# Patient Record
Sex: FEMALE | Race: WHITE | NOT HISPANIC OR LATINO | Employment: UNEMPLOYED | URBAN - METROPOLITAN AREA
[De-identification: names, ages, dates, MRNs, and addresses within clinical notes are randomized per-mention and may not be internally consistent; named-entity substitution may affect disease eponyms.]

---

## 2023-10-10 DIAGNOSIS — J84.9 INTERSTITIAL LUNG DISEASE (MULTI): ICD-10-CM

## 2023-10-11 ENCOUNTER — CLINICAL SUPPORT (OUTPATIENT)
Dept: CARDIAC REHAB | Facility: HOSPITAL | Age: 68
End: 2023-10-11
Payer: COMMERCIAL

## 2023-10-11 DIAGNOSIS — J84.9 INTERSTITIAL LUNG DISEASE (MULTI): ICD-10-CM

## 2023-10-11 PROCEDURE — G0239 OTH RESP PROC, GROUP: HCPCS | Performed by: INTERNAL MEDICINE

## 2023-10-15 PROBLEM — J32.1 SINUSITIS CHRONIC, FRONTAL: Status: ACTIVE | Noted: 2023-10-15

## 2023-10-15 PROBLEM — I10 ESSENTIAL HYPERTENSION: Status: ACTIVE | Noted: 2023-10-15

## 2023-10-15 PROBLEM — F81.9 LEARNING DIFFICULTY: Status: ACTIVE | Noted: 2023-10-15

## 2023-10-15 PROBLEM — J00 FREQUENT COMMON COLDS: Status: ACTIVE | Noted: 2023-10-15

## 2023-10-15 PROBLEM — G54.0 BRACHIAL PLEXUS DYSFUNCTION: Status: ACTIVE | Noted: 2023-10-15

## 2023-10-15 PROBLEM — J30.2 SEASONAL ALLERGIES: Status: ACTIVE | Noted: 2023-10-15

## 2023-10-15 PROBLEM — J44.9 CHRONIC OBSTRUCTIVE PULMONARY DISEASE (MULTI): Status: ACTIVE | Noted: 2023-10-15

## 2023-10-15 PROBLEM — E55.9 VITAMIN D DEFICIENCY: Status: ACTIVE | Noted: 2023-10-15

## 2023-10-15 PROBLEM — G47.33 OSA (OBSTRUCTIVE SLEEP APNEA): Status: ACTIVE | Noted: 2023-10-15

## 2023-10-15 PROBLEM — K21.9 GERD (GASTROESOPHAGEAL REFLUX DISEASE): Status: ACTIVE | Noted: 2023-10-15

## 2023-10-15 PROBLEM — R91.8 PULMONARY NODULES: Status: ACTIVE | Noted: 2023-10-15

## 2023-10-15 PROBLEM — E66.01 OBESITY, CLASS III, BMI 40-49.9 (MORBID OBESITY) (MULTI): Status: ACTIVE | Noted: 2017-06-28

## 2023-10-15 PROBLEM — E66.813 OBESITY, CLASS III, BMI 40-49.9 (MORBID OBESITY): Status: ACTIVE | Noted: 2017-06-28

## 2023-10-15 PROBLEM — M54.12 CERVICAL RADICULOPATHY: Status: ACTIVE | Noted: 2023-10-15

## 2023-10-15 PROBLEM — E11.9 TYPE 2 DIABETES MELLITUS WITHOUT COMPLICATION (MULTI): Status: ACTIVE | Noted: 2023-10-15

## 2023-10-15 PROBLEM — I87.2 VENOUS INSUFFICIENCY OF BOTH LOWER EXTREMITIES: Status: ACTIVE | Noted: 2023-10-15

## 2023-10-15 PROBLEM — Z99.81 SUPPLEMENTAL OXYGEN DEPENDENT: Status: ACTIVE | Noted: 2023-10-15

## 2023-10-15 PROBLEM — M48.02 CERVICAL STENOSIS OF SPINAL CANAL: Status: ACTIVE | Noted: 2023-10-15

## 2023-10-15 PROBLEM — E78.00 HYPERCHOLESTEROLEMIA: Status: ACTIVE | Noted: 2023-10-15

## 2023-10-15 PROBLEM — J44.1 COPD WITH EXACERBATION (MULTI): Status: ACTIVE | Noted: 2023-10-15

## 2023-10-15 PROBLEM — R60.0 EDEMA OF LOWER EXTREMITY: Status: ACTIVE | Noted: 2023-10-15

## 2023-10-15 RX ORDER — ATORVASTATIN CALCIUM 20 MG/1
20 TABLET, FILM COATED ORAL DAILY
COMMUNITY
Start: 2022-12-14 | End: 2024-02-19 | Stop reason: SDUPTHER

## 2023-10-15 RX ORDER — OMEPRAZOLE 20 MG/1
1 TABLET, DELAYED RELEASE ORAL DAILY
COMMUNITY
End: 2023-12-26 | Stop reason: ALTCHOICE

## 2023-10-15 RX ORDER — FLUTICASONE PROPIONATE 50 MCG
1 SPRAY, SUSPENSION (ML) NASAL DAILY
COMMUNITY
End: 2024-04-05

## 2023-10-15 RX ORDER — DOCUSATE SODIUM 100 MG/1
100 CAPSULE, LIQUID FILLED ORAL 2 TIMES DAILY
COMMUNITY
Start: 2022-01-20 | End: 2023-12-26 | Stop reason: ALTCHOICE

## 2023-10-15 RX ORDER — FLUTICASONE FUROATE, UMECLIDINIUM BROMIDE AND VILANTEROL TRIFENATATE 200; 62.5; 25 UG/1; UG/1; UG/1
1 POWDER RESPIRATORY (INHALATION) DAILY
COMMUNITY

## 2023-10-15 RX ORDER — LORATADINE 10 MG/1
10 TABLET ORAL DAILY PRN
COMMUNITY
Start: 2019-06-25

## 2023-10-15 RX ORDER — METFORMIN HYDROCHLORIDE 500 MG/1
500 TABLET ORAL
COMMUNITY
Start: 2022-01-20 | End: 2024-05-15 | Stop reason: SDUPTHER

## 2023-10-15 RX ORDER — BLOOD SUGAR DIAGNOSTIC
STRIP MISCELLANEOUS 2 TIMES DAILY
COMMUNITY
Start: 2015-08-06 | End: 2023-10-27

## 2023-10-15 RX ORDER — FLUTICASONE FUROATE AND VILANTEROL 200; 25 UG/1; UG/1
1 POWDER RESPIRATORY (INHALATION) DAILY
COMMUNITY
Start: 2023-05-13 | End: 2023-12-26 | Stop reason: ALTCHOICE

## 2023-10-15 RX ORDER — ALBUTEROL SULFATE 0.83 MG/ML
2.5 SOLUTION RESPIRATORY (INHALATION) EVERY 6 HOURS PRN
COMMUNITY

## 2023-10-15 RX ORDER — SODIUM CHLORIDE 0.9 % (FLUSH) 0.9 %
10 SYRINGE (ML) INJECTION
COMMUNITY
Start: 2023-07-26 | End: 2023-12-26 | Stop reason: ALTCHOICE

## 2023-10-15 RX ORDER — FUROSEMIDE 20 MG/1
20 TABLET ORAL DAILY
COMMUNITY
End: 2024-05-15

## 2023-10-15 RX ORDER — GUAIFENESIN AND DEXTROMETHORPHAN HYDROBROMIDE 1200; 60 MG/1; MG/1
1 TABLET, EXTENDED RELEASE ORAL EVERY 12 HOURS
COMMUNITY
Start: 2023-03-10 | End: 2024-05-15

## 2023-10-15 RX ORDER — IPRATROPIUM BROMIDE 0.5 MG/2.5ML
2.5 SOLUTION RESPIRATORY (INHALATION) 4 TIMES DAILY
COMMUNITY

## 2023-10-15 RX ORDER — ATORVASTATIN CALCIUM 40 MG/1
40 TABLET, FILM COATED ORAL DAILY
COMMUNITY
End: 2023-12-26 | Stop reason: SDUPTHER

## 2023-10-15 RX ORDER — NAPROXEN SODIUM 220 MG/1
TABLET, FILM COATED ORAL
COMMUNITY
Start: 2016-02-22

## 2023-10-15 RX ORDER — APIXABAN 5 MG/1
5 TABLET, FILM COATED ORAL 2 TIMES DAILY
COMMUNITY
End: 2023-12-26 | Stop reason: SDUPTHER

## 2023-10-15 RX ORDER — LISINOPRIL 10 MG/1
10 TABLET ORAL DAILY
COMMUNITY
Start: 2022-12-14

## 2023-10-15 RX ORDER — ALBUTEROL SULFATE 90 UG/1
1 AEROSOL, METERED RESPIRATORY (INHALATION) EVERY 4 HOURS PRN
COMMUNITY
End: 2024-01-08

## 2023-10-15 RX ORDER — BENZONATATE 100 MG/1
200 CAPSULE ORAL 3 TIMES DAILY PRN
COMMUNITY

## 2023-10-15 RX ORDER — MYCOPHENOLATE MOFETIL 500 MG/1
1000 TABLET ORAL 2 TIMES DAILY
COMMUNITY

## 2023-10-15 RX ORDER — IPRATROPIUM BROMIDE AND ALBUTEROL SULFATE 2.5; .5 MG/3ML; MG/3ML
3 SOLUTION RESPIRATORY (INHALATION) 4 TIMES DAILY
COMMUNITY
End: 2023-12-26 | Stop reason: SDUPTHER

## 2023-10-15 RX ORDER — POLYETHYLENE GLYCOL 3350 17 G/17G
17 POWDER, FOR SOLUTION ORAL DAILY
COMMUNITY
Start: 2022-01-20

## 2023-10-15 RX ORDER — ALBUTEROL SULFATE 1.25 MG/3ML
1.25 SOLUTION RESPIRATORY (INHALATION) EVERY 6 HOURS
COMMUNITY
End: 2023-12-26 | Stop reason: SDUPTHER

## 2023-10-15 RX ORDER — SODIUM CHLORIDE FOR INHALATION 3 %
4 VIAL, NEBULIZER (ML) INHALATION 2 TIMES DAILY
COMMUNITY

## 2023-10-17 ENCOUNTER — CLINICAL SUPPORT (OUTPATIENT)
Dept: CARDIAC REHAB | Facility: HOSPITAL | Age: 68
End: 2023-10-17
Payer: COMMERCIAL

## 2023-10-17 DIAGNOSIS — J84.9 INTERSTITIAL LUNG DISEASE (MULTI): Primary | ICD-10-CM

## 2023-10-17 PROCEDURE — G0239 OTH RESP PROC, GROUP: HCPCS | Performed by: INTERNAL MEDICINE

## 2023-10-19 ENCOUNTER — APPOINTMENT (OUTPATIENT)
Dept: CARDIAC REHAB | Facility: HOSPITAL | Age: 68
End: 2023-10-19
Payer: COMMERCIAL

## 2023-10-20 ENCOUNTER — CLINICAL SUPPORT (OUTPATIENT)
Dept: CARDIAC REHAB | Facility: HOSPITAL | Age: 68
End: 2023-10-20
Payer: COMMERCIAL

## 2023-10-20 DIAGNOSIS — J84.9 INTERSTITIAL LUNG DISEASE (MULTI): ICD-10-CM

## 2023-10-20 PROCEDURE — G0239 OTH RESP PROC, GROUP: HCPCS | Performed by: INTERNAL MEDICINE

## 2023-10-24 ENCOUNTER — CLINICAL SUPPORT (OUTPATIENT)
Dept: CARDIAC REHAB | Facility: HOSPITAL | Age: 68
End: 2023-10-24
Payer: COMMERCIAL

## 2023-10-24 DIAGNOSIS — J84.9 INTERSTITIAL LUNG DISEASE (MULTI): Primary | ICD-10-CM

## 2023-10-24 PROCEDURE — G0239 OTH RESP PROC, GROUP: HCPCS | Performed by: INTERNAL MEDICINE

## 2023-10-26 ENCOUNTER — CLINICAL SUPPORT (OUTPATIENT)
Dept: CARDIAC REHAB | Facility: HOSPITAL | Age: 68
End: 2023-10-26
Payer: COMMERCIAL

## 2023-10-26 DIAGNOSIS — J84.9 INTERSTITIAL LUNG DISEASE (MULTI): Primary | ICD-10-CM

## 2023-10-26 PROCEDURE — G0239 OTH RESP PROC, GROUP: HCPCS | Performed by: INTERNAL MEDICINE

## 2023-10-27 ENCOUNTER — CLINICAL SUPPORT (OUTPATIENT)
Dept: CARDIAC REHAB | Facility: HOSPITAL | Age: 68
End: 2023-10-27
Payer: COMMERCIAL

## 2023-10-27 DIAGNOSIS — E11.9 TYPE 2 DIABETES MELLITUS WITHOUT COMPLICATION, WITHOUT LONG-TERM CURRENT USE OF INSULIN (MULTI): ICD-10-CM

## 2023-10-27 DIAGNOSIS — J84.9 INTERSTITIAL LUNG DISEASE (MULTI): ICD-10-CM

## 2023-10-27 PROCEDURE — G0239 OTH RESP PROC, GROUP: HCPCS | Performed by: INTERNAL MEDICINE

## 2023-10-27 RX ORDER — BLOOD-GLUCOSE METER
EACH MISCELLANEOUS
Qty: 50 STRIP | Refills: 15 | Status: SHIPPED | OUTPATIENT
Start: 2023-10-27

## 2023-11-07 ENCOUNTER — CLINICAL SUPPORT (OUTPATIENT)
Dept: CARDIAC REHAB | Facility: HOSPITAL | Age: 68
End: 2023-11-07
Payer: COMMERCIAL

## 2023-11-07 DIAGNOSIS — J84.9 INTERSTITIAL LUNG DISEASE (MULTI): Primary | ICD-10-CM

## 2023-11-07 PROCEDURE — G0239 OTH RESP PROC, GROUP: HCPCS | Performed by: INTERNAL MEDICINE

## 2023-11-10 ENCOUNTER — CLINICAL SUPPORT (OUTPATIENT)
Dept: CARDIAC REHAB | Facility: HOSPITAL | Age: 68
End: 2023-11-10
Payer: COMMERCIAL

## 2023-11-10 DIAGNOSIS — J84.9 INTERSTITIAL LUNG DISEASE (MULTI): Primary | ICD-10-CM

## 2023-11-10 PROCEDURE — G0239 OTH RESP PROC, GROUP: HCPCS | Performed by: INTERNAL MEDICINE

## 2023-11-14 ENCOUNTER — CLINICAL SUPPORT (OUTPATIENT)
Dept: CARDIAC REHAB | Facility: HOSPITAL | Age: 68
End: 2023-11-14
Payer: COMMERCIAL

## 2023-11-14 DIAGNOSIS — J84.9 INTERSTITIAL LUNG DISEASE (MULTI): Primary | ICD-10-CM

## 2023-11-14 PROCEDURE — G0239 OTH RESP PROC, GROUP: HCPCS | Performed by: INTERNAL MEDICINE

## 2023-11-16 ENCOUNTER — CLINICAL SUPPORT (OUTPATIENT)
Dept: CARDIAC REHAB | Facility: HOSPITAL | Age: 68
End: 2023-11-16
Payer: COMMERCIAL

## 2023-11-16 DIAGNOSIS — J84.9 INTERSTITIAL LUNG DISEASE (MULTI): Primary | ICD-10-CM

## 2023-11-16 PROCEDURE — G0239 OTH RESP PROC, GROUP: HCPCS | Performed by: INTERNAL MEDICINE

## 2023-11-17 ENCOUNTER — CLINICAL SUPPORT (OUTPATIENT)
Dept: CARDIAC REHAB | Facility: HOSPITAL | Age: 68
End: 2023-11-17
Payer: COMMERCIAL

## 2023-11-17 DIAGNOSIS — J84.9 INTERSTITIAL LUNG DISEASE (MULTI): Primary | ICD-10-CM

## 2023-11-17 PROCEDURE — G0239 OTH RESP PROC, GROUP: HCPCS | Performed by: INTERNAL MEDICINE

## 2023-11-21 ENCOUNTER — APPOINTMENT (OUTPATIENT)
Dept: CARDIAC REHAB | Facility: HOSPITAL | Age: 68
End: 2023-11-21
Payer: COMMERCIAL

## 2023-11-24 ENCOUNTER — APPOINTMENT (OUTPATIENT)
Dept: CARDIAC REHAB | Facility: HOSPITAL | Age: 68
End: 2023-11-24
Payer: COMMERCIAL

## 2023-11-28 ENCOUNTER — APPOINTMENT (OUTPATIENT)
Dept: CARDIAC REHAB | Facility: HOSPITAL | Age: 68
End: 2023-11-28
Payer: COMMERCIAL

## 2023-11-30 ENCOUNTER — CLINICAL SUPPORT (OUTPATIENT)
Dept: CARDIAC REHAB | Facility: HOSPITAL | Age: 68
End: 2023-11-30
Payer: COMMERCIAL

## 2023-11-30 DIAGNOSIS — J84.9 LUNG DISEASE, INTERSTITIAL (MULTI): Primary | ICD-10-CM

## 2023-11-30 PROCEDURE — G0239 OTH RESP PROC, GROUP: HCPCS | Performed by: INTERNAL MEDICINE

## 2023-12-01 ENCOUNTER — APPOINTMENT (OUTPATIENT)
Dept: CARDIAC REHAB | Facility: HOSPITAL | Age: 68
End: 2023-12-01
Payer: COMMERCIAL

## 2023-12-05 ENCOUNTER — CLINICAL SUPPORT (OUTPATIENT)
Dept: CARDIAC REHAB | Facility: HOSPITAL | Age: 68
End: 2023-12-05
Payer: COMMERCIAL

## 2023-12-05 DIAGNOSIS — J84.9 INTERSTITIAL LUNG DISEASE (MULTI): Primary | ICD-10-CM

## 2023-12-05 PROCEDURE — G0239 OTH RESP PROC, GROUP: HCPCS | Performed by: INTERNAL MEDICINE

## 2023-12-07 ENCOUNTER — CLINICAL SUPPORT (OUTPATIENT)
Dept: CARDIAC REHAB | Facility: HOSPITAL | Age: 68
End: 2023-12-07
Payer: COMMERCIAL

## 2023-12-07 DIAGNOSIS — J84.9 INTERSTITIAL LUNG DISEASE (MULTI): Primary | ICD-10-CM

## 2023-12-07 PROCEDURE — G0239 OTH RESP PROC, GROUP: HCPCS | Performed by: INTERNAL MEDICINE

## 2023-12-08 ENCOUNTER — CLINICAL SUPPORT (OUTPATIENT)
Dept: CARDIAC REHAB | Facility: HOSPITAL | Age: 68
End: 2023-12-08
Payer: COMMERCIAL

## 2023-12-08 DIAGNOSIS — J84.9 INTERSTITIAL LUNG DISEASE (MULTI): Primary | ICD-10-CM

## 2023-12-08 PROCEDURE — G0239 OTH RESP PROC, GROUP: HCPCS | Performed by: INTERNAL MEDICINE

## 2023-12-12 ENCOUNTER — CLINICAL SUPPORT (OUTPATIENT)
Dept: CARDIAC REHAB | Facility: HOSPITAL | Age: 68
End: 2023-12-12
Payer: COMMERCIAL

## 2023-12-12 DIAGNOSIS — J84.9 INTERSTITIAL LUNG DISEASE (MULTI): ICD-10-CM

## 2023-12-12 PROCEDURE — G0239 OTH RESP PROC, GROUP: HCPCS | Performed by: INTERNAL MEDICINE

## 2023-12-14 ENCOUNTER — CLINICAL SUPPORT (OUTPATIENT)
Dept: CARDIAC REHAB | Facility: HOSPITAL | Age: 68
End: 2023-12-14
Payer: COMMERCIAL

## 2023-12-14 DIAGNOSIS — J84.9 INTERSTITIAL LUNG DISEASE (MULTI): Primary | ICD-10-CM

## 2023-12-14 PROCEDURE — G0239 OTH RESP PROC, GROUP: HCPCS | Performed by: INTERNAL MEDICINE

## 2023-12-15 ENCOUNTER — APPOINTMENT (OUTPATIENT)
Dept: CARDIAC REHAB | Facility: HOSPITAL | Age: 68
End: 2023-12-15
Payer: COMMERCIAL

## 2023-12-19 ENCOUNTER — CLINICAL SUPPORT (OUTPATIENT)
Dept: CARDIAC REHAB | Facility: HOSPITAL | Age: 68
End: 2023-12-19
Payer: COMMERCIAL

## 2023-12-19 DIAGNOSIS — J84.9 INTERSTITIAL LUNG DISEASE (MULTI): Primary | ICD-10-CM

## 2023-12-19 PROCEDURE — G0239 OTH RESP PROC, GROUP: HCPCS | Performed by: INTERNAL MEDICINE

## 2023-12-21 ENCOUNTER — CLINICAL SUPPORT (OUTPATIENT)
Dept: CARDIAC REHAB | Facility: HOSPITAL | Age: 68
End: 2023-12-21
Payer: COMMERCIAL

## 2023-12-21 DIAGNOSIS — J84.9 INTERSTITIAL LUNG DISEASE (MULTI): Primary | ICD-10-CM

## 2023-12-21 PROCEDURE — G0239 OTH RESP PROC, GROUP: HCPCS | Performed by: INTERNAL MEDICINE

## 2023-12-22 ENCOUNTER — CLINICAL SUPPORT (OUTPATIENT)
Dept: CARDIAC REHAB | Facility: HOSPITAL | Age: 68
End: 2023-12-22
Payer: COMMERCIAL

## 2023-12-22 DIAGNOSIS — J84.9 INTERSTITIAL LUNG DISEASE (MULTI): Primary | ICD-10-CM

## 2023-12-22 PROCEDURE — G0239 OTH RESP PROC, GROUP: HCPCS | Performed by: INTERNAL MEDICINE

## 2023-12-26 ENCOUNTER — APPOINTMENT (OUTPATIENT)
Dept: CARDIAC REHAB | Facility: HOSPITAL | Age: 68
End: 2023-12-26
Payer: COMMERCIAL

## 2023-12-26 ENCOUNTER — OFFICE VISIT (OUTPATIENT)
Dept: PRIMARY CARE | Facility: CLINIC | Age: 68
End: 2023-12-26
Payer: COMMERCIAL

## 2023-12-26 VITALS
HEIGHT: 63 IN | OXYGEN SATURATION: 98 % | SYSTOLIC BLOOD PRESSURE: 126 MMHG | BODY MASS INDEX: 31.4 KG/M2 | WEIGHT: 177.2 LBS | TEMPERATURE: 97 F | HEART RATE: 89 BPM | DIASTOLIC BLOOD PRESSURE: 78 MMHG

## 2023-12-26 DIAGNOSIS — E11.9 TYPE 2 DIABETES MELLITUS WITHOUT COMPLICATION, WITHOUT LONG-TERM CURRENT USE OF INSULIN (MULTI): ICD-10-CM

## 2023-12-26 DIAGNOSIS — Z86.718 HISTORY OF DVT OF LOWER EXTREMITY: ICD-10-CM

## 2023-12-26 DIAGNOSIS — I10 ESSENTIAL HYPERTENSION: Primary | ICD-10-CM

## 2023-12-26 DIAGNOSIS — J84.9 INTERSTITIAL LUNG DISEASE (MULTI): ICD-10-CM

## 2023-12-26 DIAGNOSIS — J20.9 ACUTE BRONCHITIS, UNSPECIFIED ORGANISM: ICD-10-CM

## 2023-12-26 DIAGNOSIS — K21.9 GASTROESOPHAGEAL REFLUX DISEASE, UNSPECIFIED WHETHER ESOPHAGITIS PRESENT: ICD-10-CM

## 2023-12-26 PROCEDURE — 3008F BODY MASS INDEX DOCD: CPT | Performed by: NURSE PRACTITIONER

## 2023-12-26 PROCEDURE — 3078F DIAST BP <80 MM HG: CPT | Performed by: NURSE PRACTITIONER

## 2023-12-26 PROCEDURE — 3074F SYST BP LT 130 MM HG: CPT | Performed by: NURSE PRACTITIONER

## 2023-12-26 PROCEDURE — 4010F ACE/ARB THERAPY RXD/TAKEN: CPT | Performed by: NURSE PRACTITIONER

## 2023-12-26 PROCEDURE — 1160F RVW MEDS BY RX/DR IN RCRD: CPT | Performed by: NURSE PRACTITIONER

## 2023-12-26 PROCEDURE — 1159F MED LIST DOCD IN RCRD: CPT | Performed by: NURSE PRACTITIONER

## 2023-12-26 PROCEDURE — 1126F AMNT PAIN NOTED NONE PRSNT: CPT | Performed by: NURSE PRACTITIONER

## 2023-12-26 PROCEDURE — 1036F TOBACCO NON-USER: CPT | Performed by: NURSE PRACTITIONER

## 2023-12-26 PROCEDURE — 99214 OFFICE O/P EST MOD 30 MIN: CPT | Performed by: NURSE PRACTITIONER

## 2023-12-26 RX ORDER — GUAIFENESIN 600 MG/1
600 TABLET, EXTENDED RELEASE ORAL 2 TIMES DAILY
Qty: 20 TABLET | Refills: 0 | Status: SHIPPED | OUTPATIENT
Start: 2023-12-26 | End: 2024-12-25

## 2023-12-26 RX ORDER — APIXABAN 5 MG/1
5 TABLET, FILM COATED ORAL 2 TIMES DAILY
Qty: 180 TABLET | Refills: 3 | Status: SHIPPED | OUTPATIENT
Start: 2023-12-26 | End: 2024-12-25

## 2023-12-26 RX ORDER — AZITHROMYCIN 250 MG/1
250 TABLET, FILM COATED ORAL DAILY
Qty: 6 TABLET | Refills: 0 | Status: SHIPPED | OUTPATIENT
Start: 2023-12-26 | End: 2023-12-31

## 2023-12-26 ASSESSMENT — ENCOUNTER SYMPTOMS
ADENOPATHY: 0
EYE DISCHARGE: 0
LOSS OF SENSATION IN FEET: 0
PHOTOPHOBIA: 0
SINUS PAIN: 0
DYSURIA: 0
BRUISES/BLEEDS EASILY: 0
JOINT SWELLING: 0
ACTIVITY CHANGE: 0
DIARRHEA: 0
ARTHRALGIAS: 0
HEADACHES: 0
TREMORS: 0
ABDOMINAL PAIN: 0
OCCASIONAL FEELINGS OF UNSTEADINESS: 0
DEPRESSION: 0
BACK PAIN: 0
BLOOD IN STOOL: 0
AGITATION: 0
WHEEZING: 0
COUGH: 1
CONSTIPATION: 0
NERVOUS/ANXIOUS: 0
SORE THROAT: 0
SHORTNESS OF BREATH: 0
WEAKNESS: 0
APPETITE CHANGE: 0
PALPITATIONS: 0
DIZZINESS: 0
HEMATURIA: 0

## 2023-12-26 ASSESSMENT — LIFESTYLE VARIABLES: HOW MANY STANDARD DRINKS CONTAINING ALCOHOL DO YOU HAVE ON A TYPICAL DAY: PATIENT DOES NOT DRINK

## 2023-12-26 ASSESSMENT — PATIENT HEALTH QUESTIONNAIRE - PHQ9
1. LITTLE INTEREST OR PLEASURE IN DOING THINGS: NOT AT ALL
SUM OF ALL RESPONSES TO PHQ9 QUESTIONS 1 AND 2: 0
2. FEELING DOWN, DEPRESSED OR HOPELESS: NOT AT ALL

## 2023-12-26 ASSESSMENT — COPD QUESTIONNAIRES: COPD: 1

## 2023-12-26 ASSESSMENT — PAIN SCALES - GENERAL: PAINLEVEL: 0-NO PAIN

## 2023-12-26 NOTE — PATIENT INSTRUCTIONS
Focus on healthy eating including lean proteins and vegetables.  Avoid high carbohydrate high sugar foods and drinks.  Try to increase physical activity as tolerated.  Goal is for 160 minutes/week of physical activity.  If you are missing meals, consider drinking protein drinks to help keep your nutrition up  Start azithromycin 2 tablets today and then 1 tablet daily  Continue mucinex twice a day for cough  Make sure to drink plenty of fluid

## 2023-12-26 NOTE — PROGRESS NOTES
Subjective   Patient ID: Airam Tyson is a 68 y.o. female who presents for Cough.    Cough  This is a recurrent problem. The current episode started in the past 7 days. The problem has been gradually worsening. The problem occurs every few minutes. The cough is Productive of sputum. Associated symptoms include ear congestion, ear pain and nasal congestion. Pertinent negatives include no chest pain, headaches, rash, sore throat, shortness of breath or wheezing. The symptoms are aggravated by lying down. She has tried OTC cough suppressant, ipratropium inhaler and a beta-agonist inhaler for the symptoms. The treatment provided mild relief. Her past medical history is significant for asthma and COPD. There is no history of environmental allergies.        Review of Systems   Constitutional:  Negative for activity change and appetite change.   HENT:  Positive for ear pain. Negative for congestion, hearing loss, sinus pain and sore throat.    Eyes:  Negative for photophobia, discharge and visual disturbance.   Respiratory:  Positive for cough. Negative for shortness of breath and wheezing.    Cardiovascular:  Negative for chest pain, palpitations and leg swelling.   Gastrointestinal:  Negative for abdominal pain, blood in stool, constipation and diarrhea.   Endocrine: Negative for cold intolerance, heat intolerance and polyuria.   Genitourinary:  Negative for dysuria and hematuria.   Musculoskeletal:  Negative for arthralgias, back pain and joint swelling.   Skin:  Negative for rash.   Allergic/Immunologic: Negative for environmental allergies and food allergies.   Neurological:  Negative for dizziness, tremors, syncope, weakness and headaches.   Hematological:  Negative for adenopathy. Does not bruise/bleed easily.   Psychiatric/Behavioral:  Negative for agitation and suicidal ideas. The patient is not nervous/anxious.        Objective   /78 (BP Location: Right arm, Patient Position: Sitting)   Pulse 89    "Temp 36.1 °C (97 °F) (Temporal)   Ht 1.6 m (5' 3\")   Wt 80.4 kg (177 lb 3.2 oz)   SpO2 98%   BMI 31.39 kg/m²     Physical Exam  Vitals reviewed.   Constitutional:       General: She is not in acute distress.     Appearance: Normal appearance.   HENT:      Head: Normocephalic.      Right Ear: Tympanic membrane normal.      Left Ear: Tympanic membrane normal.      Nose: Nose normal.      Mouth/Throat:      Mouth: Mucous membranes are moist.   Eyes:      Conjunctiva/sclera: Conjunctivae normal.      Pupils: Pupils are equal, round, and reactive to light.   Cardiovascular:      Rate and Rhythm: Normal rate and regular rhythm.      Pulses: Normal pulses.      Heart sounds: Normal heart sounds.   Pulmonary:      Effort: Pulmonary effort is normal.      Breath sounds: Normal breath sounds.   Abdominal:      General: Bowel sounds are normal.      Palpations: Abdomen is soft.   Musculoskeletal:         General: Normal range of motion.   Skin:     General: Skin is warm and dry.      Capillary Refill: Capillary refill takes less than 2 seconds.   Neurological:      Mental Status: She is alert and oriented to person, place, and time.   Psychiatric:         Mood and Affect: Mood normal.         Speech: Speech normal.         Assessment/Plan   Problem List Items Addressed This Visit             ICD-10-CM    Essential hypertension - Primary I10    GERD (gastroesophageal reflux disease) K21.9    Type 2 diabetes mellitus without complication (CMS/[facility]) E11.9    Interstitial lung disease (CMS/[facility]) J84.9     Other Visit Diagnoses         Codes    Acute bronchitis, unspecified organism     J20.9    Relevant Medications    azithromycin (Zithromax) 250 mg tablet    guaiFENesin (Mucinex) 600 mg 12 hr tablet        Focus on healthy eating including lean proteins and vegetables.  Avoid high carbohydrate high sugar foods and drinks.  Try to increase physical activity as tolerated.  Goal is for 160 minutes/week of physical activity.  If " you are missing meals, consider drinking protein drinks to help keep your nutrition up  Start azithromycin 2 tablets today and then 1 tablet daily  Continue mucinex twice a day for cough  Make sure to drink plenty of fluid

## 2023-12-28 ENCOUNTER — APPOINTMENT (OUTPATIENT)
Dept: CARDIAC REHAB | Facility: HOSPITAL | Age: 68
End: 2023-12-28
Payer: COMMERCIAL

## 2023-12-29 ENCOUNTER — APPOINTMENT (OUTPATIENT)
Dept: CARDIAC REHAB | Facility: HOSPITAL | Age: 68
End: 2023-12-29
Payer: COMMERCIAL

## 2024-01-02 ENCOUNTER — CLINICAL SUPPORT (OUTPATIENT)
Dept: CARDIAC REHAB | Facility: HOSPITAL | Age: 69
End: 2024-01-02
Payer: COMMERCIAL

## 2024-01-02 DIAGNOSIS — J84.9 INTERSTITIAL LUNG DISEASE (MULTI): Primary | ICD-10-CM

## 2024-01-02 PROCEDURE — G0239 OTH RESP PROC, GROUP: HCPCS | Performed by: INTERNAL MEDICINE

## 2024-01-04 ENCOUNTER — CLINICAL SUPPORT (OUTPATIENT)
Dept: CARDIAC REHAB | Facility: HOSPITAL | Age: 69
End: 2024-01-04
Payer: COMMERCIAL

## 2024-01-04 DIAGNOSIS — J84.9 INTERSTITIAL LUNG DISEASE (MULTI): Primary | ICD-10-CM

## 2024-01-04 PROCEDURE — G0239 OTH RESP PROC, GROUP: HCPCS | Performed by: INTERNAL MEDICINE

## 2024-01-05 ENCOUNTER — CLINICAL SUPPORT (OUTPATIENT)
Dept: CARDIAC REHAB | Facility: HOSPITAL | Age: 69
End: 2024-01-05
Payer: COMMERCIAL

## 2024-01-05 DIAGNOSIS — J84.9 INTERSTITIAL LUNG DISEASE (MULTI): Primary | ICD-10-CM

## 2024-01-05 PROCEDURE — G0239 OTH RESP PROC, GROUP: HCPCS | Performed by: INTERNAL MEDICINE

## 2024-01-06 DIAGNOSIS — J44.9 CHRONIC OBSTRUCTIVE PULMONARY DISEASE, UNSPECIFIED (MULTI): ICD-10-CM

## 2024-01-08 RX ORDER — ALBUTEROL SULFATE 90 UG/1
AEROSOL, METERED RESPIRATORY (INHALATION)
Qty: 18 G | Refills: 5 | Status: SHIPPED | OUTPATIENT
Start: 2024-01-08

## 2024-01-11 ENCOUNTER — APPOINTMENT (OUTPATIENT)
Dept: CARDIAC REHAB | Facility: HOSPITAL | Age: 69
End: 2024-01-11
Payer: COMMERCIAL

## 2024-01-12 ENCOUNTER — APPOINTMENT (OUTPATIENT)
Dept: CARDIAC REHAB | Facility: HOSPITAL | Age: 69
End: 2024-01-12
Payer: COMMERCIAL

## 2024-01-16 ENCOUNTER — CLINICAL SUPPORT (OUTPATIENT)
Dept: CARDIAC REHAB | Facility: HOSPITAL | Age: 69
End: 2024-01-16
Payer: COMMERCIAL

## 2024-01-16 DIAGNOSIS — J84.9 INTERSTITIAL LUNG DISEASE (MULTI): Primary | ICD-10-CM

## 2024-01-16 PROCEDURE — G0239 OTH RESP PROC, GROUP: HCPCS | Performed by: INTERNAL MEDICINE

## 2024-01-17 ENCOUNTER — OFFICE VISIT (OUTPATIENT)
Dept: PRIMARY CARE | Facility: CLINIC | Age: 69
End: 2024-01-17
Payer: COMMERCIAL

## 2024-01-17 VITALS
HEART RATE: 103 BPM | SYSTOLIC BLOOD PRESSURE: 118 MMHG | OXYGEN SATURATION: 89 % | DIASTOLIC BLOOD PRESSURE: 76 MMHG | BODY MASS INDEX: 31.11 KG/M2 | HEIGHT: 63 IN | WEIGHT: 175.6 LBS | TEMPERATURE: 97.7 F

## 2024-01-17 DIAGNOSIS — K21.9 GASTROESOPHAGEAL REFLUX DISEASE, UNSPECIFIED WHETHER ESOPHAGITIS PRESENT: ICD-10-CM

## 2024-01-17 DIAGNOSIS — E11.9 TYPE 2 DIABETES MELLITUS WITHOUT COMPLICATION, WITHOUT LONG-TERM CURRENT USE OF INSULIN (MULTI): ICD-10-CM

## 2024-01-17 DIAGNOSIS — J43.1 PANLOBULAR EMPHYSEMA (MULTI): ICD-10-CM

## 2024-01-17 DIAGNOSIS — I10 ESSENTIAL HYPERTENSION: Primary | ICD-10-CM

## 2024-01-17 DIAGNOSIS — G47.33 OSA (OBSTRUCTIVE SLEEP APNEA): ICD-10-CM

## 2024-01-17 DIAGNOSIS — Z99.81 SUPPLEMENTAL OXYGEN DEPENDENT: ICD-10-CM

## 2024-01-17 PROBLEM — E66.813 OBESITY, CLASS III, BMI 40-49.9 (MORBID OBESITY): Status: RESOLVED | Noted: 2017-06-28 | Resolved: 2024-01-17

## 2024-01-17 PROBLEM — E11.65 TYPE 2 DIABETES MELLITUS WITH HYPERGLYCEMIA (MULTI): Status: ACTIVE | Noted: 2024-01-17

## 2024-01-17 PROBLEM — J00 FREQUENT COMMON COLDS: Status: RESOLVED | Noted: 2023-10-15 | Resolved: 2024-01-17

## 2024-01-17 PROBLEM — R60.0 EDEMA OF LOWER EXTREMITY: Status: RESOLVED | Noted: 2023-10-15 | Resolved: 2024-01-17

## 2024-01-17 PROBLEM — J44.1 COPD WITH EXACERBATION (MULTI): Status: RESOLVED | Noted: 2023-10-15 | Resolved: 2024-01-17

## 2024-01-17 PROBLEM — E66.01 OBESITY, CLASS III, BMI 40-49.9 (MORBID OBESITY) (MULTI): Status: RESOLVED | Noted: 2017-06-28 | Resolved: 2024-01-17

## 2024-01-17 LAB — POC HEMOGLOBIN A1C: 5.6 % (ref 4.2–6.5)

## 2024-01-17 PROCEDURE — 3074F SYST BP LT 130 MM HG: CPT | Performed by: NURSE PRACTITIONER

## 2024-01-17 PROCEDURE — 3008F BODY MASS INDEX DOCD: CPT | Performed by: NURSE PRACTITIONER

## 2024-01-17 PROCEDURE — 3078F DIAST BP <80 MM HG: CPT | Performed by: NURSE PRACTITIONER

## 2024-01-17 PROCEDURE — 4010F ACE/ARB THERAPY RXD/TAKEN: CPT | Performed by: NURSE PRACTITIONER

## 2024-01-17 PROCEDURE — 1160F RVW MEDS BY RX/DR IN RCRD: CPT | Performed by: NURSE PRACTITIONER

## 2024-01-17 PROCEDURE — 83036 HEMOGLOBIN GLYCOSYLATED A1C: CPT | Performed by: NURSE PRACTITIONER

## 2024-01-17 PROCEDURE — 1125F AMNT PAIN NOTED PAIN PRSNT: CPT | Performed by: NURSE PRACTITIONER

## 2024-01-17 PROCEDURE — 1159F MED LIST DOCD IN RCRD: CPT | Performed by: NURSE PRACTITIONER

## 2024-01-17 PROCEDURE — 99213 OFFICE O/P EST LOW 20 MIN: CPT | Performed by: NURSE PRACTITIONER

## 2024-01-17 PROCEDURE — 1036F TOBACCO NON-USER: CPT | Performed by: NURSE PRACTITIONER

## 2024-01-17 ASSESSMENT — ENCOUNTER SYMPTOMS
CONSTIPATION: 0
JOINT SWELLING: 0
ACTIVITY CHANGE: 0
COUGH: 0
ABDOMINAL PAIN: 0
HEMATURIA: 0
WHEEZING: 0
EYE DISCHARGE: 0
WEAKNESS: 0
AGITATION: 0
BRUISES/BLEEDS EASILY: 0
BLOOD IN STOOL: 0
DIARRHEA: 0
DIZZINESS: 0
SORE THROAT: 0
ADENOPATHY: 0
APPETITE CHANGE: 0
DYSURIA: 0
PHOTOPHOBIA: 0
TREMORS: 0
SINUS PAIN: 0
DEPRESSION: 0
BACK PAIN: 0
OCCASIONAL FEELINGS OF UNSTEADINESS: 0
ARTHRALGIAS: 0
SHORTNESS OF BREATH: 0
PALPITATIONS: 0
NERVOUS/ANXIOUS: 0
HEADACHES: 0
LOSS OF SENSATION IN FEET: 0

## 2024-01-17 ASSESSMENT — PATIENT HEALTH QUESTIONNAIRE - PHQ9
2. FEELING DOWN, DEPRESSED OR HOPELESS: NOT AT ALL
1. LITTLE INTEREST OR PLEASURE IN DOING THINGS: NOT AT ALL
SUM OF ALL RESPONSES TO PHQ9 QUESTIONS 1 AND 2: 0

## 2024-01-17 ASSESSMENT — PAIN SCALES - GENERAL: PAINLEVEL: 6

## 2024-01-17 NOTE — PROGRESS NOTES
"Subjective   Patient ID: Airam Tyson is a 68 y.o. female who presents for Cough (Pt present for a cough and throat issues.).    Presents today with concerns for intermittent sore throat.  She reports that she does not wear her CPAP and admits to snoring.  She does utilize her oxygen 24/7.  She denies increase in shortness of breath.  She denies cough.  She denies sinus pain or pressure.  She denies ear pain.  She states sore throat is intermittent and typically improves with drinking cold fluids or tea with honey.  She does not check her blood sugars very often.  She does check them when she goes to pulmonary rehab and states they have been under 150.  Hemoglobin A1c in office today is 5.6.         Review of Systems   Constitutional:  Negative for activity change and appetite change.   HENT:  Negative for congestion, ear pain, hearing loss, sinus pain and sore throat.    Eyes:  Negative for photophobia, discharge and visual disturbance.   Respiratory:  Negative for cough, shortness of breath and wheezing.    Cardiovascular:  Negative for chest pain, palpitations and leg swelling.   Gastrointestinal:  Negative for abdominal pain, blood in stool, constipation and diarrhea.   Endocrine: Negative for cold intolerance, heat intolerance and polyuria.   Genitourinary:  Negative for dysuria and hematuria.   Musculoskeletal:  Negative for arthralgias, back pain and joint swelling.   Skin:  Negative for rash.   Allergic/Immunologic: Negative for environmental allergies and food allergies.   Neurological:  Negative for dizziness, tremors, syncope, weakness and headaches.   Hematological:  Negative for adenopathy. Does not bruise/bleed easily.   Psychiatric/Behavioral:  Negative for agitation and suicidal ideas. The patient is not nervous/anxious.        Objective   /76 (BP Location: Left arm, Patient Position: Sitting)   Pulse 103   Temp 36.5 °C (97.7 °F) (Temporal)   Ht 1.6 m (5' 3\")   Wt 79.7 kg (175 lb 9.6 " oz)   SpO2 (!) 89%   BMI 31.11 kg/m²     Physical Exam  Vitals reviewed.   Constitutional:       General: She is not in acute distress.     Appearance: Normal appearance.   HENT:      Head: Normocephalic.      Right Ear: Tympanic membrane normal.      Left Ear: Tympanic membrane normal.      Nose: Nose normal.      Mouth/Throat:      Lips: Pink. No lesions.      Mouth: Mucous membranes are moist. No injury, lacerations or oral lesions.      Tongue: Lesions present. Tongue does not deviate from midline.      Palate: No mass and lesions.      Pharynx: Oropharynx is clear. No pharyngeal swelling.   Eyes:      Conjunctiva/sclera: Conjunctivae normal.      Pupils: Pupils are equal, round, and reactive to light.   Cardiovascular:      Rate and Rhythm: Normal rate and regular rhythm.      Pulses: Normal pulses.      Heart sounds: Normal heart sounds.   Pulmonary:      Effort: Pulmonary effort is normal.      Breath sounds: Normal breath sounds.   Abdominal:      General: Bowel sounds are normal.      Palpations: Abdomen is soft.   Musculoskeletal:         General: Normal range of motion.   Skin:     General: Skin is warm and dry.      Capillary Refill: Capillary refill takes less than 2 seconds.   Neurological:      Mental Status: She is alert and oriented to person, place, and time.   Psychiatric:         Mood and Affect: Mood normal.         Speech: Speech normal.         Assessment/Plan   Problem List Items Addressed This Visit             ICD-10-CM    Chronic obstructive pulmonary disease (CMS/[facility]) J44.9    Essential hypertension - Primary I10    GERD (gastroesophageal reflux disease) K21.9    DORA (obstructive sleep apnea) G47.33    Supplemental oxygen dependent Z99.81    Type 2 diabetes mellitus without complication (CMS/[facility]) E11.9    Relevant Orders    POCT glycosylated hemoglobin (Hb A1C) manually resulted     Focus on healthy eating including lean proteins and vegetables.  Avoid high carbohydrate high sugar foods  and drinks.  Try to increase physical activity as tolerated.  Goal is for 160 minutes/week of physical activity.  Check blood pressure 2-3 times a week.  Call for blood pressures greater than 140/90.  Bring list of blood pressures to next visit.  Check blood sugars 1-2 times per day.  Call for fasting blood sugars that are remaining greater than 150 or random blood sugars that are remaining greater than 180.  Consider chewable or gummy version of multivitamins these may be easier to swallow  Cool fluids help with dry throat  Sugar free candy or throat lozenges can help as well

## 2024-01-17 NOTE — PATIENT INSTRUCTIONS
Focus on healthy eating including lean proteins and vegetables.  Avoid high carbohydrate high sugar foods and drinks.  Try to increase physical activity as tolerated.  Goal is for 160 minutes/week of physical activity.  Check blood pressure 2-3 times a week.  Call for blood pressures greater than 140/90.  Bring list of blood pressures to next visit.  Check blood sugars 1-2 times per day.  Call for fasting blood sugars that are remaining greater than 150 or random blood sugars that are remaining greater than 180.  Consider chewable or gummy version of multivitamins these may be easier to swallow  Cool fluids help with dry throat  Sugar free candy or throat lozenges can help as well

## 2024-01-18 ENCOUNTER — CLINICAL SUPPORT (OUTPATIENT)
Dept: CARDIAC REHAB | Facility: HOSPITAL | Age: 69
End: 2024-01-18
Payer: COMMERCIAL

## 2024-01-18 DIAGNOSIS — J84.9 INTERSTITIAL LUNG DISEASE (MULTI): ICD-10-CM

## 2024-01-18 PROCEDURE — G0239 OTH RESP PROC, GROUP: HCPCS | Performed by: INTERNAL MEDICINE

## 2024-01-19 ENCOUNTER — APPOINTMENT (OUTPATIENT)
Dept: CARDIAC REHAB | Facility: HOSPITAL | Age: 69
End: 2024-01-19
Payer: COMMERCIAL

## 2024-01-25 ENCOUNTER — CLINICAL SUPPORT (OUTPATIENT)
Dept: CARDIAC REHAB | Facility: HOSPITAL | Age: 69
End: 2024-01-25
Payer: COMMERCIAL

## 2024-01-25 DIAGNOSIS — J84.9 INTERSTITIAL LUNG DISEASE (MULTI): Primary | ICD-10-CM

## 2024-01-25 PROCEDURE — G0239 OTH RESP PROC, GROUP: HCPCS | Performed by: INTERNAL MEDICINE

## 2024-01-26 ENCOUNTER — CLINICAL SUPPORT (OUTPATIENT)
Dept: CARDIAC REHAB | Facility: HOSPITAL | Age: 69
End: 2024-01-26
Payer: COMMERCIAL

## 2024-01-26 DIAGNOSIS — J84.9 INTERSTITIAL LUNG DISEASE (MULTI): Primary | ICD-10-CM

## 2024-01-26 PROCEDURE — G0239 OTH RESP PROC, GROUP: HCPCS | Performed by: INTERNAL MEDICINE

## 2024-01-30 ENCOUNTER — APPOINTMENT (OUTPATIENT)
Dept: CARDIAC REHAB | Facility: HOSPITAL | Age: 69
End: 2024-01-30
Payer: COMMERCIAL

## 2024-02-01 ENCOUNTER — CLINICAL SUPPORT (OUTPATIENT)
Dept: CARDIAC REHAB | Facility: HOSPITAL | Age: 69
End: 2024-02-01
Payer: COMMERCIAL

## 2024-02-01 DIAGNOSIS — J84.9 INTERSTITIAL LUNG DISEASE (MULTI): ICD-10-CM

## 2024-02-01 PROCEDURE — 94625 PHY/QHP OP PULM RHB W/O MNTR: CPT | Performed by: INTERNAL MEDICINE

## 2024-02-01 PROCEDURE — G0239 OTH RESP PROC, GROUP: HCPCS | Performed by: INTERNAL MEDICINE

## 2024-02-06 ENCOUNTER — CLINICAL SUPPORT (OUTPATIENT)
Dept: CARDIAC REHAB | Facility: HOSPITAL | Age: 69
End: 2024-02-06
Payer: COMMERCIAL

## 2024-02-06 DIAGNOSIS — J84.9 INTERSTITIAL LUNG DISEASE (MULTI): Primary | ICD-10-CM

## 2024-02-06 PROCEDURE — G0239 OTH RESP PROC, GROUP: HCPCS | Performed by: INTERNAL MEDICINE

## 2024-02-08 ENCOUNTER — CLINICAL SUPPORT (OUTPATIENT)
Dept: CARDIAC REHAB | Facility: HOSPITAL | Age: 69
End: 2024-02-08
Payer: COMMERCIAL

## 2024-02-08 DIAGNOSIS — J84.9 INTERSTITIAL LUNG DISEASE (MULTI): ICD-10-CM

## 2024-02-08 PROCEDURE — G0239 OTH RESP PROC, GROUP: HCPCS | Performed by: INTERNAL MEDICINE

## 2024-02-09 ENCOUNTER — CLINICAL SUPPORT (OUTPATIENT)
Dept: CARDIAC REHAB | Facility: HOSPITAL | Age: 69
End: 2024-02-09
Payer: COMMERCIAL

## 2024-02-09 DIAGNOSIS — J84.9 INTERSTITIAL LUNG DISEASE (MULTI): Primary | ICD-10-CM

## 2024-02-09 PROCEDURE — G0239 OTH RESP PROC, GROUP: HCPCS | Performed by: INTERNAL MEDICINE

## 2024-02-13 ENCOUNTER — CLINICAL SUPPORT (OUTPATIENT)
Dept: CARDIAC REHAB | Facility: HOSPITAL | Age: 69
End: 2024-02-13
Payer: COMMERCIAL

## 2024-02-13 DIAGNOSIS — J84.9 INTERSTITIAL LUNG DISEASE (MULTI): ICD-10-CM

## 2024-02-13 PROCEDURE — G0239 OTH RESP PROC, GROUP: HCPCS | Performed by: INTERNAL MEDICINE

## 2024-02-15 ENCOUNTER — CLINICAL SUPPORT (OUTPATIENT)
Dept: CARDIAC REHAB | Facility: HOSPITAL | Age: 69
End: 2024-02-15
Payer: COMMERCIAL

## 2024-02-15 DIAGNOSIS — J84.9 INTERSTITIAL LUNG DISEASE (MULTI): Primary | ICD-10-CM

## 2024-02-15 PROCEDURE — G0239 OTH RESP PROC, GROUP: HCPCS | Performed by: INTERNAL MEDICINE

## 2024-02-16 ENCOUNTER — CLINICAL SUPPORT (OUTPATIENT)
Dept: CARDIAC REHAB | Facility: HOSPITAL | Age: 69
End: 2024-02-16
Payer: COMMERCIAL

## 2024-02-16 DIAGNOSIS — J84.9 ILD (INTERSTITIAL LUNG DISEASE) (MULTI): ICD-10-CM

## 2024-02-16 PROCEDURE — G0239 OTH RESP PROC, GROUP: HCPCS | Performed by: INTERNAL MEDICINE

## 2024-02-18 DIAGNOSIS — E78.00 HYPERCHOLESTEROLEMIA: Primary | ICD-10-CM

## 2024-02-19 RX ORDER — DOXYCYCLINE 100 MG/1
CAPSULE ORAL EVERY 12 HOURS
COMMUNITY
Start: 2022-06-07

## 2024-02-19 RX ORDER — ATORVASTATIN CALCIUM 40 MG/1
40 TABLET, FILM COATED ORAL DAILY
Qty: 90 TABLET | Refills: 1 | Status: SHIPPED | OUTPATIENT
Start: 2024-02-19 | End: 2025-02-18

## 2024-02-19 RX ORDER — CEFDINIR 300 MG/1
CAPSULE ORAL EVERY 12 HOURS
COMMUNITY
Start: 2022-10-10

## 2024-02-19 RX ORDER — PREDNISONE 50 MG/1
TABLET ORAL EVERY 24 HOURS
COMMUNITY
Start: 2022-05-03

## 2024-02-19 RX ORDER — OMEPRAZOLE 20 MG/1
20 CAPSULE, DELAYED RELEASE ORAL DAILY
COMMUNITY
Start: 2023-12-04

## 2024-02-19 RX ORDER — AMOXICILLIN 875 MG/1
TABLET, FILM COATED ORAL EVERY 12 HOURS
COMMUNITY
Start: 2022-08-23

## 2024-02-23 ENCOUNTER — CLINICAL SUPPORT (OUTPATIENT)
Dept: CARDIAC REHAB | Facility: HOSPITAL | Age: 69
End: 2024-02-23
Payer: COMMERCIAL

## 2024-02-23 DIAGNOSIS — J84.9 INTERSTITIAL LUNG DISEASE (MULTI): Primary | ICD-10-CM

## 2024-02-23 PROCEDURE — G0239 OTH RESP PROC, GROUP: HCPCS | Performed by: INTERNAL MEDICINE

## 2024-02-29 ENCOUNTER — CLINICAL SUPPORT (OUTPATIENT)
Dept: CARDIAC REHAB | Facility: HOSPITAL | Age: 69
End: 2024-02-29
Payer: COMMERCIAL

## 2024-02-29 DIAGNOSIS — J84.9 INTERSTITIAL LUNG DISEASE (MULTI): ICD-10-CM

## 2024-02-29 PROCEDURE — G0239 OTH RESP PROC, GROUP: HCPCS | Performed by: INTERNAL MEDICINE

## 2024-04-05 DIAGNOSIS — J30.2 SEASONAL ALLERGIES: Primary | ICD-10-CM

## 2024-04-05 RX ORDER — FLUTICASONE PROPIONATE 50 MCG
1 SPRAY, SUSPENSION (ML) NASAL DAILY
Qty: 16 ML | Refills: 3 | Status: SHIPPED | OUTPATIENT
Start: 2024-04-05

## 2024-05-14 DIAGNOSIS — I10 HYPERTENSION, UNSPECIFIED TYPE: ICD-10-CM

## 2024-05-14 DIAGNOSIS — J44.1 CHRONIC OBSTRUCTIVE PULMONARY DISEASE WITH (ACUTE) EXACERBATION (MULTI): ICD-10-CM

## 2024-05-14 DIAGNOSIS — E11.9 TYPE 2 DIABETES MELLITUS WITHOUT COMPLICATION, WITHOUT LONG-TERM CURRENT USE OF INSULIN (MULTI): ICD-10-CM

## 2024-05-15 RX ORDER — METFORMIN HYDROCHLORIDE 500 MG/1
500 TABLET, EXTENDED RELEASE ORAL 2 TIMES DAILY
Qty: 180 TABLET | Refills: 3 | Status: SHIPPED | OUTPATIENT
Start: 2024-05-15 | End: 2025-05-15

## 2024-05-15 RX ORDER — DEXTROMETHORPHAN HBR, GUAIFENESIN 1200; 60 MG/1; MG/1
1 TABLET, EXTENDED RELEASE ORAL EVERY 12 HOURS PRN
Qty: 56 TABLET | Refills: 6 | Status: SHIPPED | OUTPATIENT
Start: 2024-05-15

## 2024-05-15 RX ORDER — PREDNISONE 10 MG/1
TABLET ORAL
COMMUNITY
Start: 2024-05-01

## 2024-05-15 RX ORDER — FUROSEMIDE 20 MG/1
20 TABLET ORAL DAILY
Qty: 90 TABLET | Refills: 3 | Status: SHIPPED | OUTPATIENT
Start: 2024-05-15 | End: 2025-05-15

## 2024-05-28 ENCOUNTER — HOSPITAL ENCOUNTER (EMERGENCY)
Facility: HOSPITAL | Age: 69
Discharge: OTHER INSTITUTION | End: 2024-05-28
Attending: EMERGENCY MEDICINE
Payer: COMMERCIAL

## 2024-05-28 VITALS
HEART RATE: 102 BPM | DIASTOLIC BLOOD PRESSURE: 67 MMHG | OXYGEN SATURATION: 95 % | BODY MASS INDEX: 29.02 KG/M2 | RESPIRATION RATE: 18 BRPM | TEMPERATURE: 97.7 F | HEIGHT: 64 IN | WEIGHT: 170 LBS | SYSTOLIC BLOOD PRESSURE: 97 MMHG

## 2024-05-28 DIAGNOSIS — B02.8 HERPES ZOSTER WITH COMPLICATION: Primary | ICD-10-CM

## 2024-05-28 PROCEDURE — 99283 EMERGENCY DEPT VISIT LOW MDM: CPT

## 2024-05-28 PROCEDURE — 99281 EMR DPT VST MAYX REQ PHY/QHP: CPT

## 2024-05-28 ASSESSMENT — VISUAL ACUITY
OU: 20 50
OD: 20 200
OS: 20 50

## 2024-05-28 ASSESSMENT — COLUMBIA-SUICIDE SEVERITY RATING SCALE - C-SSRS
1. IN THE PAST MONTH, HAVE YOU WISHED YOU WERE DEAD OR WISHED YOU COULD GO TO SLEEP AND NOT WAKE UP?: NO
2. HAVE YOU ACTUALLY HAD ANY THOUGHTS OF KILLING YOURSELF?: NO
6. HAVE YOU EVER DONE ANYTHING, STARTED TO DO ANYTHING, OR PREPARED TO DO ANYTHING TO END YOUR LIFE?: NO

## 2024-05-28 ASSESSMENT — PAIN DESCRIPTION - LOCATION: LOCATION: HEAD

## 2024-05-28 ASSESSMENT — PAIN DESCRIPTION - ORIENTATION: ORIENTATION: RIGHT

## 2024-05-28 ASSESSMENT — PAIN SCALES - GENERAL: PAINLEVEL_OUTOF10: 9

## 2024-05-28 ASSESSMENT — PAIN - FUNCTIONAL ASSESSMENT: PAIN_FUNCTIONAL_ASSESSMENT: 0-10

## 2024-05-28 NOTE — PROGRESS NOTES
The patient was seen by the midlevel/resident.  I have personally saw the patient and made/approved the management plan and take responsibility for the patient management.  I reviewed the EKG's (when done) and agree with the interpretation.  I have seen and examined the patient; agree with the workup, evaluation, MDM, and diagnosis.  The care plan has been discussed with the midlevel/resident; I have reviewed the note and agree with the documented findings.     Presents from urgent care with swelling around her right forehead scalp and her upper eyelid.  She has lesions that appear to be shingles.  Plan is to do fluorescein stain in her eye and make sure she does not have any involvement of the eyeball.  Start her on antiviral medications.  She has no airway issue.  She has no confusion.  She has not had a shingles vaccine.  She does have some dye uptake and will be transferred for evaluation by ophthalmology.  Diagnoses as of 05/28/24 1553   Herpes zoster with complication     Olaf Rolon MD

## 2024-05-28 NOTE — ED PROVIDER NOTES
HPI   Chief Complaint   Patient presents with    Rash     Pt has a raised painful itchy rash to top rt side of her head and rt eye. Rt eye is swollen shut.       This is a 68-year-old female with PMH COPD presenting for evaluation of 2-day history of painful itchy rash in the right side of her head and right eye with associated eyelid swelling.  Reports vision symptoms associated with her eyelid being swollen shut.  Dull ocular pain.  Denies any blurred vision, diplopia, photophobia.      History provided by:  Patient   used: No                        [place] Coma Scale Score: 15                     Patient History   Past Medical History:   Diagnosis Date    COPD (chronic obstructive pulmonary disease) (Multi)     Diabetes mellitus (Multi)     GERD (gastroesophageal reflux disease)     Hypertension      History reviewed. No pertinent surgical history.  Family History   Problem Relation Name Age of Onset    Bone cancer Mother      Other (brain bleed) Father       Social History     Tobacco Use    Smoking status: Never    Smokeless tobacco: Never   Vaping Use    Vaping status: Never Used   Substance Use Topics    Alcohol use: Never    Drug use: Never       Physical Exam   ED Triage Vitals [05/28/24 1056]   Temperature Heart Rate Respirations BP   36.5 °C (97.7 °F) (!) 102 18 97/67      Pulse Ox Temp src Heart Rate Source Patient Position   95 % -- -- --      BP Location FiO2 (%)     -- --       Physical Exam    Gen.: Vitals noted no distress afebrile.  Optho: Exam of the left eye is unremarkable. Exam of the right eye shows conjunctival injection.  Significant upper lid edema.  Fluorescein was instilled.  Was viewed under the woods lamp. There did appear to be a small area of punctate dye uptake at the 11 and 12 o'clock position of the iris. Negative Deborah. There is no direct or consensual photophobia. No afferent pupillary defect. The pupil is normal morphology. PERRL. EOMs intact and  nontender  Cardiac: Regular rate rhythm no murmur.   Lungs: Clear to auscultation bilaterally. No adventitious breath sounds.  Skin: Macular vesicular dermatomal rash on the right upper face suspect V1 distribution    ED Course & MDM   Diagnoses as of 05/28/24 1214   Herpes zoster with complication       Medical Decision Making  Exam concerning for shingles rash with ocular involvement.  On tetracaine exam there did appear to be a small area of dye uptake on the upper iris concerning for dendrites.  I discussed the case with Dr. Pena ophthalmology who recommended transfer to [facility] for formal ophthalmology evaluation.  Dr. Zavala ED physician excepted as well.  Patient will be transferred by private vehicle.  This visit was staffed with the attending physician Dr. Rolon.      Disclaimer: This note was dictated using speech recognition software. An attempt at proofreading was made to minimize errors. Minor errors in transcription may be present. Please call if questions.        Procedure  Procedures     Dario Ocampo PA-C  05/28/24 1218

## 2024-06-21 ENCOUNTER — TELEPHONE (OUTPATIENT)
Dept: PRIMARY CARE | Facility: CLINIC | Age: 69
End: 2024-06-21
Payer: COMMERCIAL

## 2024-06-21 NOTE — TELEPHONE ENCOUNTER
"LOV: 1/17/24  Patient called in stating she was recently in ER for shingles earlier this month. She is asking what can be done to help loosen the \"scabs\" of the lesions that have crusted over, causing her severe discomfort. Please advise.   "

## 2024-06-21 NOTE — TELEPHONE ENCOUNTER
Pt called office back. I went over message with her. She stated understanding and had no further questions

## 2024-06-26 ENCOUNTER — APPOINTMENT (OUTPATIENT)
Dept: PRIMARY CARE | Facility: CLINIC | Age: 69
End: 2024-06-26
Payer: COMMERCIAL

## 2024-06-26 VITALS
HEIGHT: 64 IN | WEIGHT: 165.6 LBS | HEART RATE: 105 BPM | BODY MASS INDEX: 28.27 KG/M2 | SYSTOLIC BLOOD PRESSURE: 130 MMHG | DIASTOLIC BLOOD PRESSURE: 82 MMHG | TEMPERATURE: 98.1 F | OXYGEN SATURATION: 97 %

## 2024-06-26 DIAGNOSIS — E11.9 TYPE 2 DIABETES MELLITUS WITHOUT COMPLICATION, WITHOUT LONG-TERM CURRENT USE OF INSULIN (MULTI): ICD-10-CM

## 2024-06-26 DIAGNOSIS — K21.9 GASTROESOPHAGEAL REFLUX DISEASE, UNSPECIFIED WHETHER ESOPHAGITIS PRESENT: ICD-10-CM

## 2024-06-26 DIAGNOSIS — J84.9 INTERSTITIAL LUNG DISEASE (MULTI): ICD-10-CM

## 2024-06-26 DIAGNOSIS — B02.9 ACUTE PAIN ASSOCIATED WITH HERPES ZOSTER: Primary | ICD-10-CM

## 2024-06-26 LAB — POC HEMOGLOBIN A1C: 5.7 % (ref 4.2–6.5)

## 2024-06-26 PROCEDURE — 3075F SYST BP GE 130 - 139MM HG: CPT | Performed by: NURSE PRACTITIONER

## 2024-06-26 PROCEDURE — 3079F DIAST BP 80-89 MM HG: CPT | Performed by: NURSE PRACTITIONER

## 2024-06-26 PROCEDURE — 83036 HEMOGLOBIN GLYCOSYLATED A1C: CPT | Performed by: NURSE PRACTITIONER

## 2024-06-26 PROCEDURE — 99214 OFFICE O/P EST MOD 30 MIN: CPT | Performed by: NURSE PRACTITIONER

## 2024-06-26 PROCEDURE — 1124F ACP DISCUSS-NO DSCNMKR DOCD: CPT | Performed by: NURSE PRACTITIONER

## 2024-06-26 PROCEDURE — 1036F TOBACCO NON-USER: CPT | Performed by: NURSE PRACTITIONER

## 2024-06-26 PROCEDURE — 4010F ACE/ARB THERAPY RXD/TAKEN: CPT | Performed by: NURSE PRACTITIONER

## 2024-06-26 PROCEDURE — 1159F MED LIST DOCD IN RCRD: CPT | Performed by: NURSE PRACTITIONER

## 2024-06-26 PROCEDURE — 1125F AMNT PAIN NOTED PAIN PRSNT: CPT | Performed by: NURSE PRACTITIONER

## 2024-06-26 RX ORDER — GABAPENTIN 300 MG/1
300 CAPSULE ORAL 2 TIMES DAILY
Qty: 60 CAPSULE | Refills: 0 | Status: SHIPPED | OUTPATIENT
Start: 2024-06-26 | End: 2024-07-26

## 2024-06-26 RX ORDER — GABAPENTIN 300 MG/1
300 CAPSULE ORAL
COMMUNITY
Start: 2024-06-10 | End: 2024-06-26 | Stop reason: SDUPTHER

## 2024-06-26 RX ORDER — ACETAMINOPHEN 500 MG
1000 TABLET ORAL EVERY 6 HOURS PRN
COMMUNITY
Start: 2024-06-02

## 2024-06-26 RX ORDER — DORZOLAMIDE HCL 20 MG/ML
1 SOLUTION/ DROPS OPHTHALMIC 2 TIMES DAILY
COMMUNITY
Start: 2024-06-02

## 2024-06-26 RX ORDER — VALACYCLOVIR HYDROCHLORIDE 1 G/1
TABLET, FILM COATED ORAL
COMMUNITY
Start: 2024-06-02

## 2024-06-26 RX ORDER — PREDNISONE 20 MG/1
TABLET ORAL
COMMUNITY
Start: 2024-06-02 | End: 2024-06-26 | Stop reason: ALTCHOICE

## 2024-06-26 RX ORDER — PANTOPRAZOLE SODIUM 20 MG/1
20 TABLET, DELAYED RELEASE ORAL
COMMUNITY
Start: 2024-06-03

## 2024-06-26 RX ORDER — ERYTHROMYCIN 5 MG/G
OINTMENT OPHTHALMIC
COMMUNITY
Start: 2024-06-02

## 2024-06-26 RX ORDER — FLUTICASONE FUROATE, UMECLIDINIUM BROMIDE AND VILANTEROL TRIFENATATE 200; 62.5; 25 UG/1; UG/1; UG/1
1 POWDER RESPIRATORY (INHALATION) DAILY
Qty: 1 EACH | Refills: 11 | Status: SHIPPED | OUTPATIENT
Start: 2024-06-26

## 2024-06-26 ASSESSMENT — ENCOUNTER SYMPTOMS
BLOOD IN STOOL: 0
DIZZINESS: 0
PHOTOPHOBIA: 0
SORE THROAT: 0
TREMORS: 0
DYSURIA: 0
ADENOPATHY: 0
AGITATION: 0
WHEEZING: 0
SHORTNESS OF BREATH: 0
EYE DISCHARGE: 0
WEAKNESS: 0
HEMATURIA: 0
BRUISES/BLEEDS EASILY: 0
DIABETIC ASSOCIATED SYMPTOMS: 0
NERVOUS/ANXIOUS: 0
CONSTIPATION: 0
COUGH: 0
ARTHRALGIAS: 0
SINUS PAIN: 0
LOSS OF SENSATION IN FEET: 0
ACTIVITY CHANGE: 0
JOINT SWELLING: 0
BACK PAIN: 0
DIARRHEA: 0
DEPRESSION: 0
APPETITE CHANGE: 0
ABDOMINAL PAIN: 0
NUMBNESS: 1
OCCASIONAL FEELINGS OF UNSTEADINESS: 0
HEADACHES: 0
PALPITATIONS: 0

## 2024-06-26 ASSESSMENT — LIFESTYLE VARIABLES
HOW OFTEN DO YOU HAVE A DRINK CONTAINING ALCOHOL: NEVER
HOW MANY STANDARD DRINKS CONTAINING ALCOHOL DO YOU HAVE ON A TYPICAL DAY: PATIENT DOES NOT DRINK

## 2024-06-26 ASSESSMENT — PAIN SCALES - GENERAL: PAINLEVEL: 8

## 2024-06-26 NOTE — PATIENT INSTRUCTIONS
Try aquaphor after shower to help moisturize affected area  Increase Gabapentin to twice a day for the discomfort  Call for worsening or not improving symptoms  Call eye doctor if your eyes worsen, or you have a change in your vision  Focus on healthy eating including lean proteins and vegetables.  Avoid high carbohydrate high sugar foods and drinks.  Try to increase physical activity as tolerated.  Goal is for 160 minutes/week of physical activity.  Check blood pressure 2-3 times a week.  Call for blood pressures greater than 140/90.  Bring list of blood pressures to next visit.  Check blood sugars 1-2 times per day.  Call for fasting blood sugars that are remaining greater than 150 or random blood sugars that are remaining greater than 180.

## 2024-06-26 NOTE — PROGRESS NOTES
Subjective   Patient ID: Airam Tyson is a 69 y.o. female who presents for Diabetes (She also has shingles on the right side of her face,dx [facility].She is still having pain in her head.).    Patient presents for follow up of diabetes. Current symptoms include: none. Symptoms have been well-controlled. Patient denies foot ulcerations, hyperglycemia, hypoglycemia , nausea, and paresthesia of the feet. Evaluation to date has included: fasting blood sugar, fasting lipid panel, and hemoglobin A1C.  Home sugars: BGs consistently in an acceptable range. Current treatment: no recent interventions. Last dilated eye exam: 06/06/2024.  Patient has concerns over recent Herpes Zoster outbreak in which she was in the hospital for from 5/31- 6/2 she was prescribed IV antibiotics, predisone, eye drops and solution, and gabapentin. Patients outbreak is present on her scalp and she has a swollen eye and right side of the face. Patient seen by ID and ophthamology who prescribed her valtrex for one week, with no follow up unless visual changes. Patient rash has some crusting over but is still causing discomfort and pain throughout the day. Patient states the gabapentin helps in the morning but by the evening the pain has increased. Patient denies fever, shortness of breath, chest pain at this time.             Diabetes  She presents for her follow-up diabetic visit. She has type 2 diabetes mellitus. Her disease course has been stable. There are no hypoglycemic associated symptoms. Pertinent negatives for hypoglycemia include no dizziness, headaches, nervousness/anxiousness or tremors. There are no diabetic associated symptoms. Pertinent negatives for diabetes include no chest pain, no polyuria and no weakness. There are no hypoglycemic complications. Symptoms are stable. There are no diabetic complications. Risk factors for coronary artery disease include diabetes mellitus, hypertension, obesity and dyslipidemia. Current  "diabetic treatment includes oral agent (monotherapy). She is compliant with treatment most of the time. She is following a diabetic diet. Meal planning includes avoidance of concentrated sweets. She participates in exercise intermittently. An ACE inhibitor/angiotensin II receptor blocker is being taken. Eye exam is current.        Review of Systems   Constitutional:  Negative for activity change and appetite change.   HENT:  Negative for congestion, ear pain, hearing loss, sinus pain and sore throat.    Eyes:  Negative for photophobia, discharge and visual disturbance.   Respiratory:  Negative for cough, shortness of breath and wheezing.    Cardiovascular:  Negative for chest pain, palpitations and leg swelling.   Gastrointestinal:  Negative for abdominal pain, blood in stool, constipation and diarrhea.   Endocrine: Negative for cold intolerance, heat intolerance and polyuria.   Genitourinary:  Negative for dysuria and hematuria.   Musculoskeletal:  Negative for arthralgias, back pain and joint swelling.   Skin:  Negative for rash.   Allergic/Immunologic: Negative for environmental allergies and food allergies.   Neurological:  Positive for numbness. Negative for dizziness, tremors, syncope, weakness and headaches.   Hematological:  Negative for adenopathy. Does not bruise/bleed easily.   Psychiatric/Behavioral:  Negative for agitation and suicidal ideas. The patient is not nervous/anxious.        Objective   /82 (BP Location: Left arm, Patient Position: Sitting)   Pulse 105   Temp 36.7 °C (98.1 °F) (Temporal)   Ht 1.626 m (5' 4\")   Wt 75.1 kg (165 lb 9.6 oz)   SpO2 97%   BMI 28.43 kg/m²     Physical Exam  Constitutional:       Appearance: Normal appearance.   HENT:      Head: Normocephalic.      Nose: Nose normal.      Mouth/Throat:      Mouth: Mucous membranes are moist.   Cardiovascular:      Rate and Rhythm: Normal rate and regular rhythm.      Pulses: Normal pulses.      Heart sounds: Normal heart " sounds.   Pulmonary:      Effort: Pulmonary effort is normal.      Breath sounds: Normal breath sounds.   Abdominal:      General: Bowel sounds are normal.      Palpations: Abdomen is soft.   Musculoskeletal:         General: Normal range of motion.      Cervical back: Neck supple.   Skin:     General: Skin is warm and dry.             Comments: Herpes Zoster lesions noted to forehead and into scalp area.  Various stages of healing.  Some crusted, some dry, some oozing serous drainage.  No signs of infection   Neurological:      Mental Status: She is alert and oriented to person, place, and time.   Psychiatric:         Mood and Affect: Mood normal.         Assessment/Plan   Problem List Items Addressed This Visit             ICD-10-CM    GERD (gastroesophageal reflux disease) K21.9    Type 2 diabetes mellitus without complication (Multi) E11.9    Relevant Orders    Follow Up In Primary Care - Established    POCT glycosylated hemoglobin (Hb A1C) manually resulted (Completed)    Interstitial lung disease (Multi) J84.9    Relevant Medications    fluticasone-umeclidin-vilanter (Trelegy Ellipta) 200-62.5-25 mcg blister with device    Other Relevant Orders    Follow Up In Primary Care - Established     Other Visit Diagnoses         Codes    Acute pain associated with herpes zoster    -  Primary B02.9    Relevant Medications    gabapentin (Neurontin) 300 mg capsule          Try aquaphor after shower to help moisturize affected area  Increase Gabapentin to twice a day for the discomfort  Call for worsening or not improving symptoms  Call eye doctor if your eyes worsen, or you have a change in your vision  Focus on healthy eating including lean proteins and vegetables.  Avoid high carbohydrate high sugar foods and drinks.  Try to increase physical activity as tolerated.  Goal is for 160 minutes/week of physical activity.  Check blood pressure 2-3 times a week.  Call for blood pressures greater than 140/90.  Bring list of  blood pressures to next visit.  Check blood sugars 1-2 times per day.  Call for fasting blood sugars that are remaining greater than 150 or random blood sugars that are remaining greater than 180.

## 2024-07-01 ENCOUNTER — TELEPHONE (OUTPATIENT)
Dept: PRIMARY CARE | Facility: CLINIC | Age: 69
End: 2024-07-01
Payer: COMMERCIAL

## 2024-07-01 DIAGNOSIS — B02.23 SHINGLES (HERPES ZOSTER) POLYNEUROPATHY: Primary | ICD-10-CM

## 2024-07-01 RX ORDER — TRAMADOL HYDROCHLORIDE 50 MG/1
50 TABLET ORAL EVERY 12 HOURS PRN
Qty: 10 TABLET | Refills: 0 | Status: SHIPPED | OUTPATIENT
Start: 2024-07-01 | End: 2024-07-06

## 2024-07-01 NOTE — TELEPHONE ENCOUNTER
Pt called said she is still in pain wants to know if you would call in something else for her to [facility].

## 2024-07-08 ENCOUNTER — TELEPHONE (OUTPATIENT)
Dept: PRIMARY CARE | Facility: CLINIC | Age: 69
End: 2024-07-08
Payer: COMMERCIAL

## 2024-07-08 DIAGNOSIS — B02.9 ACUTE PAIN ASSOCIATED WITH HERPES ZOSTER: ICD-10-CM

## 2024-07-08 RX ORDER — GABAPENTIN 300 MG/1
300 CAPSULE ORAL 3 TIMES DAILY
Qty: 90 CAPSULE | Refills: 1 | Status: SHIPPED | OUTPATIENT
Start: 2024-07-08 | End: 2025-07-08

## 2024-07-08 NOTE — TELEPHONE ENCOUNTER
Pt called office and stated that she was given tramadol for her shingles. She states this is not helping her pain, burning, throbbing she is having from the shingles on her face. She is wondering if there is something else that can be called in for her? [facility] [place] confirmed by pt

## 2024-07-08 NOTE — TELEPHONE ENCOUNTER
Call to pt made. She is agreeable to the gabapentin 3 times a day. However she needs a refill on it with the new directions. [facility] [place]

## 2024-07-11 ENCOUNTER — OFFICE VISIT (OUTPATIENT)
Dept: PRIMARY CARE | Facility: CLINIC | Age: 69
End: 2024-07-11
Payer: COMMERCIAL

## 2024-07-11 VITALS
HEART RATE: 105 BPM | BODY MASS INDEX: 28.65 KG/M2 | WEIGHT: 167.8 LBS | SYSTOLIC BLOOD PRESSURE: 124 MMHG | HEIGHT: 64 IN | OXYGEN SATURATION: 93 % | TEMPERATURE: 98.1 F | DIASTOLIC BLOOD PRESSURE: 74 MMHG

## 2024-07-11 DIAGNOSIS — B02.23 SHINGLES (HERPES ZOSTER) POLYNEUROPATHY: ICD-10-CM

## 2024-07-11 DIAGNOSIS — J44.1 COPD WITH ACUTE EXACERBATION (MULTI): Primary | ICD-10-CM

## 2024-07-11 PROCEDURE — 3074F SYST BP LT 130 MM HG: CPT | Performed by: NURSE PRACTITIONER

## 2024-07-11 PROCEDURE — 1159F MED LIST DOCD IN RCRD: CPT | Performed by: NURSE PRACTITIONER

## 2024-07-11 PROCEDURE — 4010F ACE/ARB THERAPY RXD/TAKEN: CPT | Performed by: NURSE PRACTITIONER

## 2024-07-11 PROCEDURE — 1036F TOBACCO NON-USER: CPT | Performed by: NURSE PRACTITIONER

## 2024-07-11 PROCEDURE — 3078F DIAST BP <80 MM HG: CPT | Performed by: NURSE PRACTITIONER

## 2024-07-11 PROCEDURE — 1124F ACP DISCUSS-NO DSCNMKR DOCD: CPT | Performed by: NURSE PRACTITIONER

## 2024-07-11 PROCEDURE — 99214 OFFICE O/P EST MOD 30 MIN: CPT | Performed by: NURSE PRACTITIONER

## 2024-07-11 PROCEDURE — 1125F AMNT PAIN NOTED PAIN PRSNT: CPT | Performed by: NURSE PRACTITIONER

## 2024-07-11 PROCEDURE — 1160F RVW MEDS BY RX/DR IN RCRD: CPT | Performed by: NURSE PRACTITIONER

## 2024-07-11 RX ORDER — AMOXICILLIN AND CLAVULANATE POTASSIUM 875; 125 MG/1; MG/1
875 TABLET, FILM COATED ORAL 2 TIMES DAILY
Qty: 20 TABLET | Refills: 0 | Status: SHIPPED | OUTPATIENT
Start: 2024-07-11 | End: 2024-07-21

## 2024-07-11 RX ORDER — TRAMADOL HYDROCHLORIDE 50 MG/1
50 TABLET ORAL EVERY 12 HOURS PRN
Qty: 10 TABLET | Refills: 0 | Status: SHIPPED | OUTPATIENT
Start: 2024-07-11 | End: 2024-07-16

## 2024-07-11 RX ORDER — ALBUTEROL SULFATE 0.83 MG/ML
2.5 SOLUTION RESPIRATORY (INHALATION) EVERY 6 HOURS PRN
Qty: 75 ML | Refills: 3 | Status: SHIPPED | OUTPATIENT
Start: 2024-07-11

## 2024-07-11 RX ORDER — PREDNISONE 50 MG/1
50 TABLET ORAL DAILY
Qty: 5 TABLET | Refills: 0 | Status: SHIPPED | OUTPATIENT
Start: 2024-07-11 | End: 2024-07-16

## 2024-07-11 ASSESSMENT — ENCOUNTER SYMPTOMS
ADENOPATHY: 0
WEAKNESS: 0
OCCASIONAL FEELINGS OF UNSTEADINESS: 0
HEMATURIA: 0
DYSURIA: 0
DEPRESSION: 0
PHOTOPHOBIA: 0
WHEEZING: 1
COUGH: 1
AGITATION: 0
CONSTIPATION: 0
JOINT SWELLING: 0
DIARRHEA: 0
PALPITATIONS: 0
SHORTNESS OF BREATH: 1
BLOOD IN STOOL: 0
APPETITE CHANGE: 0
EYE DISCHARGE: 0
ACTIVITY CHANGE: 0
LOSS OF SENSATION IN FEET: 0
DIZZINESS: 0
NERVOUS/ANXIOUS: 0
TREMORS: 0
ARTHRALGIAS: 0
SORE THROAT: 0
HEADACHES: 0
ABDOMINAL PAIN: 0
BACK PAIN: 0
BRUISES/BLEEDS EASILY: 0
SINUS PAIN: 0

## 2024-07-11 ASSESSMENT — PATIENT HEALTH QUESTIONNAIRE - PHQ9
2. FEELING DOWN, DEPRESSED OR HOPELESS: NOT AT ALL
SUM OF ALL RESPONSES TO PHQ9 QUESTIONS 1 AND 2: 0
1. LITTLE INTEREST OR PLEASURE IN DOING THINGS: NOT AT ALL

## 2024-07-11 ASSESSMENT — PAIN SCALES - GENERAL: PAINLEVEL: 8

## 2024-07-11 NOTE — PROGRESS NOTES
Subjective   Patient ID: Airam Tyson is a 69 y.o. female who presents for URI (Cough,spitting up phlegm.Facial swelling.).    Upper Respiratory Infection  Patient complains of symptoms of a URI. Symptoms include achiness, congestion, cough described as productive, and nasal congestion. Onset of symptoms was 5 days ago, and has been gradually worsening since that time. Treatment to date: antihistamines, cough suppressants, and decongestants. She reports her shingles symptoms have been gradually improving but she continues to verbalize discomfort especially to the right side of her forehead.  She reports the tramadol is helpful along with the gabapentin to control her pain.    URI   This is a recurrent problem. The current episode started in the past 7 days. The problem has been gradually worsening. There has been no fever. Associated symptoms include congestion, coughing, ear pain and wheezing. Pertinent negatives include no abdominal pain, chest pain, diarrhea, dysuria, headaches, rash, sinus pain or sore throat. She has tried antihistamine, inhaler use, decongestant and increased fluids for the symptoms. The treatment provided no relief.      She notes that her shingles symptoms have been gradually improving but she continues to have issues with pain especially to the right side of her forehead.  Review of Systems   Constitutional:  Negative for activity change and appetite change.   HENT:  Positive for congestion and ear pain. Negative for hearing loss, sinus pain and sore throat.    Eyes:  Negative for photophobia, discharge and visual disturbance.   Respiratory:  Positive for cough, shortness of breath and wheezing.    Cardiovascular:  Negative for chest pain, palpitations and leg swelling.   Gastrointestinal:  Negative for abdominal pain, blood in stool, constipation and diarrhea.   Endocrine: Negative for cold intolerance, heat intolerance and polyuria.   Genitourinary:  Negative for dysuria and  "hematuria.   Musculoskeletal:  Negative for arthralgias, back pain and joint swelling.   Skin:  Negative for rash.   Allergic/Immunologic: Negative for environmental allergies and food allergies.   Neurological:  Negative for dizziness, tremors, syncope, weakness and headaches.   Hematological:  Negative for adenopathy. Does not bruise/bleed easily.   Psychiatric/Behavioral:  Negative for agitation and suicidal ideas. The patient is not nervous/anxious.        Objective   /74 (BP Location: Left arm, Patient Position: Sitting)   Pulse 105   Temp 36.7 °C (98.1 °F) (Temporal)   Ht 1.626 m (5' 4\")   Wt 76.1 kg (167 lb 12.8 oz)   SpO2 93%   BMI 28.80 kg/m²     Physical Exam  Vitals reviewed.   Constitutional:       General: She is not in acute distress.     Appearance: Normal appearance.   HENT:      Head: Normocephalic.      Right Ear: Tympanic membrane normal.      Left Ear: Tympanic membrane normal.      Nose: Nose normal.      Mouth/Throat:      Mouth: Mucous membranes are moist.   Eyes:      Conjunctiva/sclera: Conjunctivae normal.   Cardiovascular:      Rate and Rhythm: Normal rate and regular rhythm.      Heart sounds: Normal heart sounds.   Pulmonary:      Effort: Pulmonary effort is normal.      Breath sounds: Wheezing and rhonchi present.   Musculoskeletal:         General: Normal range of motion.   Skin:     General: Skin is warm and dry.   Neurological:      Mental Status: She is alert and oriented to person, place, and time.   Psychiatric:         Mood and Affect: Mood normal.         Speech: Speech normal.         Assessment/Plan   Problem List Items Addressed This Visit    None  Visit Diagnoses         Codes    COPD with acute exacerbation (Multi)    -  Primary J44.1    Relevant Medications    albuterol 2.5 mg /3 mL (0.083 %) nebulizer solution    predniSONE (Deltasone) 50 mg tablet    amoxicillin-pot clavulanate (Augmentin) 875-125 mg tablet    Shingles (herpes zoster) polyneuropathy     " B02.23    Relevant Medications    traMADol (Ultram) 50 mg tablet        Focus on healthy eating including lean proteins and vegetables.  Avoid high carbohydrate high sugar foods and drinks.  Try to increase physical activity as tolerated.  Goal is for 160 minutes/week of physical activity.  Start Augmentin , 2 times daily.  For 10 days.  Take until finished even if you feel better before then.   Call for worsening or not improving symptoms  Start prednisone one tablet once a day for 5 days.  Sometimes prednisone can make people anxious or irritable and some people have increased hunger

## 2024-07-11 NOTE — PATIENT INSTRUCTIONS
Focus on healthy eating including lean proteins and vegetables.  Avoid high carbohydrate high sugar foods and drinks.  Try to increase physical activity as tolerated.  Goal is for 160 minutes/week of physical activity.  Start Augmentin , 2 times daily.  For 10 days.  Take until finished even if you feel better before then.   Call for worsening or not improving symptoms  Start prednisone one tablet once a day for 5 days.  Sometimes prednisone can make people anxious or irritable and some people have increased hunger

## 2024-07-25 ENCOUNTER — TELEPHONE (OUTPATIENT)
Dept: PRIMARY CARE | Facility: CLINIC | Age: 69
End: 2024-07-25
Payer: COMMERCIAL

## 2024-07-25 DIAGNOSIS — B02.23 SHINGLES (HERPES ZOSTER) POLYNEUROPATHY: Primary | ICD-10-CM

## 2024-07-25 RX ORDER — TRAMADOL HYDROCHLORIDE 50 MG/1
50 TABLET ORAL EVERY 8 HOURS PRN
Qty: 20 TABLET | Refills: 0 | Status: SHIPPED | OUTPATIENT
Start: 2024-07-25 | End: 2024-08-04

## 2024-07-25 NOTE — TELEPHONE ENCOUNTER
Pt called office and is requesting a refill on her tramadol. She said this has helped her pain. She said she is all out of the medication. If this can't be refilled she is wondering if there is something else that can be called in for her. [facility] in [place]

## 2024-08-28 DIAGNOSIS — K21.9 GASTRO-ESOPHAGEAL REFLUX DISEASE WITHOUT ESOPHAGITIS: ICD-10-CM

## 2024-08-28 DIAGNOSIS — E78.00 HYPERCHOLESTEROLEMIA: ICD-10-CM

## 2024-08-28 RX ORDER — ATORVASTATIN CALCIUM 40 MG/1
40 TABLET, FILM COATED ORAL DAILY
Qty: 90 TABLET | Refills: 1 | Status: SHIPPED | OUTPATIENT
Start: 2024-08-28

## 2024-08-28 RX ORDER — BENZONATATE 100 MG/1
CAPSULE ORAL
COMMUNITY
Start: 2024-08-28

## 2024-08-28 RX ORDER — OMEPRAZOLE 20 MG/1
20 CAPSULE, DELAYED RELEASE ORAL DAILY
Qty: 90 CAPSULE | Refills: 1 | Status: SHIPPED | OUTPATIENT
Start: 2024-08-28

## 2024-09-05 PROBLEM — J96.11 CHRONIC RESPIRATORY FAILURE WITH HYPOXIA (MULTI): Status: ACTIVE | Noted: 2024-05-28

## 2024-09-05 PROBLEM — D84.9 IMMUNOCOMPROMISED: Status: ACTIVE | Noted: 2024-05-30

## 2024-09-05 PROBLEM — H02.843 SWELLING OF RIGHT EYELID: Status: ACTIVE | Noted: 2024-06-09

## 2024-09-05 PROBLEM — B02.30 HERPES ZOSTER OPHTHALMICUS: Status: ACTIVE | Noted: 2024-05-28

## 2024-09-05 PROBLEM — I27.23 PULMONARY HYPERTENSION DUE TO LUNG DISEASE (MULTI): Status: ACTIVE | Noted: 2024-06-10

## 2024-09-26 ENCOUNTER — APPOINTMENT (OUTPATIENT)
Dept: PRIMARY CARE | Facility: CLINIC | Age: 69
End: 2024-09-26
Payer: COMMERCIAL

## 2024-10-10 DIAGNOSIS — B02.9 ACUTE PAIN ASSOCIATED WITH HERPES ZOSTER: ICD-10-CM

## 2024-10-10 RX ORDER — GABAPENTIN 300 MG/1
300 CAPSULE ORAL 2 TIMES DAILY
Qty: 60 CAPSULE | Refills: 3 | Status: SHIPPED | OUTPATIENT
Start: 2024-10-10

## 2024-11-03 DIAGNOSIS — E11.9 TYPE 2 DIABETES MELLITUS WITHOUT COMPLICATION, WITHOUT LONG-TERM CURRENT USE OF INSULIN (MULTI): Primary | ICD-10-CM

## 2024-11-05 RX ORDER — BLOOD-GLUCOSE METER
EACH MISCELLANEOUS
Qty: 50 STRIP | Refills: 15 | Status: SHIPPED | OUTPATIENT
Start: 2024-11-05

## 2024-12-23 ENCOUNTER — TELEPHONE (OUTPATIENT)
Dept: PRIMARY CARE | Facility: CLINIC | Age: 69
End: 2024-12-23
Payer: COMMERCIAL

## 2024-12-23 NOTE — TELEPHONE ENCOUNTER
Pt called said [facility] and they wanted to know why she is on Eliquis pt told them that the hospital started her on it.  She was told to ask PCP why she is taking it.  Also, patient mentioned something about coming in for blood work.

## 2024-12-30 DIAGNOSIS — E78.00 HYPERCHOLESTEROLEMIA: Primary | ICD-10-CM

## 2024-12-30 DIAGNOSIS — J30.2 SEASONAL ALLERGIES: ICD-10-CM

## 2024-12-30 DIAGNOSIS — Z86.718 HISTORY OF DVT OF LOWER EXTREMITY: ICD-10-CM

## 2024-12-31 RX ORDER — ATORVASTATIN CALCIUM 40 MG/1
40 TABLET, FILM COATED ORAL DAILY
Qty: 90 TABLET | Refills: 1 | Status: SHIPPED | OUTPATIENT
Start: 2024-12-31

## 2024-12-31 RX ORDER — APIXABAN 5 MG/1
5 TABLET, FILM COATED ORAL 2 TIMES DAILY
Qty: 180 TABLET | Refills: 3 | OUTPATIENT
Start: 2024-12-31

## 2024-12-31 RX ORDER — FLUTICASONE PROPIONATE 50 MCG
1 SPRAY, SUSPENSION (ML) NASAL DAILY
Qty: 16 ML | Refills: 3 | Status: SHIPPED | OUTPATIENT
Start: 2024-12-31

## 2025-01-20 DIAGNOSIS — J44.9 CHRONIC OBSTRUCTIVE PULMONARY DISEASE, UNSPECIFIED: ICD-10-CM

## 2025-01-20 DIAGNOSIS — K21.9 GASTRO-ESOPHAGEAL REFLUX DISEASE WITHOUT ESOPHAGITIS: Primary | ICD-10-CM

## 2025-01-21 RX ORDER — ALBUTEROL SULFATE 90 UG/1
INHALANT RESPIRATORY (INHALATION)
Qty: 18 G | Refills: 5 | Status: SHIPPED | OUTPATIENT
Start: 2025-01-21

## 2025-01-21 RX ORDER — OMEPRAZOLE 20 MG/1
20 CAPSULE, DELAYED RELEASE ORAL DAILY
Qty: 90 CAPSULE | Refills: 1 | Status: SHIPPED | OUTPATIENT
Start: 2025-01-21

## 2025-05-01 ENCOUNTER — TELEPHONE (OUTPATIENT)
Dept: PRIMARY CARE | Facility: CLINIC | Age: 70
End: 2025-05-01
Payer: COMMERCIAL

## 2025-05-01 NOTE — TELEPHONE ENCOUNTER
Patient called office requesting an appointment. She has had swelling in her feet for 2-3 weeks. She has also had fatigue and weakness for months. I offered her an appointment today, she refused. Appointment scheduled for next week.I told her if her symptoms get worse, she would need to go to the ER.

## 2025-05-07 ENCOUNTER — HOSPITAL ENCOUNTER (INPATIENT)
Facility: HOSPITAL | Age: 70
Discharge: HOME | DRG: 189 | End: 2025-05-07
Attending: EMERGENCY MEDICINE | Admitting: INTERNAL MEDICINE
Payer: MEDICARE

## 2025-05-07 ENCOUNTER — APPOINTMENT (OUTPATIENT)
Dept: CARDIOLOGY | Facility: HOSPITAL | Age: 70
DRG: 189 | End: 2025-05-07
Payer: MEDICARE

## 2025-05-07 ENCOUNTER — APPOINTMENT (OUTPATIENT)
Dept: RADIOLOGY | Facility: HOSPITAL | Age: 70
DRG: 189 | End: 2025-05-07
Payer: MEDICARE

## 2025-05-07 ENCOUNTER — APPOINTMENT (OUTPATIENT)
Dept: PRIMARY CARE | Facility: CLINIC | Age: 70
End: 2025-05-07
Payer: COMMERCIAL

## 2025-05-07 VITALS
OXYGEN SATURATION: 85 % | HEART RATE: 126 BPM | SYSTOLIC BLOOD PRESSURE: 118 MMHG | DIASTOLIC BLOOD PRESSURE: 82 MMHG | TEMPERATURE: 98.1 F

## 2025-05-07 DIAGNOSIS — J84.9 INTERSTITIAL LUNG DISEASE (MULTI): ICD-10-CM

## 2025-05-07 DIAGNOSIS — E11.65 TYPE 2 DIABETES MELLITUS WITH HYPERGLYCEMIA, WITHOUT LONG-TERM CURRENT USE OF INSULIN: ICD-10-CM

## 2025-05-07 DIAGNOSIS — I27.20 SEVERE PULMONARY HYPERTENSION (MULTI): ICD-10-CM

## 2025-05-07 DIAGNOSIS — J43.1 PANLOBULAR EMPHYSEMA (MULTI): ICD-10-CM

## 2025-05-07 DIAGNOSIS — R06.02 SHORTNESS OF BREATH: ICD-10-CM

## 2025-05-07 DIAGNOSIS — J67.9 HYPERSENSITIVITY PNEUMONITIS (MULTI): ICD-10-CM

## 2025-05-07 DIAGNOSIS — I50.32 CHRONIC HEART FAILURE WITH PRESERVED EJECTION FRACTION (HFPEF): ICD-10-CM

## 2025-05-07 DIAGNOSIS — J96.21 ACUTE ON CHRONIC RESPIRATORY FAILURE WITH HYPOXIA: ICD-10-CM

## 2025-05-07 DIAGNOSIS — J18.9 PNEUMONIA OF BOTH LUNGS DUE TO INFECTIOUS ORGANISM, UNSPECIFIED PART OF LUNG: ICD-10-CM

## 2025-05-07 DIAGNOSIS — I10 ESSENTIAL HYPERTENSION: Primary | ICD-10-CM

## 2025-05-07 DIAGNOSIS — I27.23 PULMONARY HYPERTENSION DUE TO LUNG DISEASE (MULTI): ICD-10-CM

## 2025-05-07 DIAGNOSIS — I87.2 VENOUS INSUFFICIENCY OF BOTH LOWER EXTREMITIES: ICD-10-CM

## 2025-05-07 DIAGNOSIS — J96.01 ACUTE RESPIRATORY FAILURE WITH HYPOXIA: Primary | ICD-10-CM

## 2025-05-07 LAB
ALBUMIN SERPL BCP-MCNC: 3.8 G/DL (ref 3.4–5)
ALP SERPL-CCNC: 59 U/L (ref 33–136)
ALT SERPL W P-5'-P-CCNC: 23 U/L (ref 7–45)
ANION GAP BLDV CALCULATED.4IONS-SCNC: 5 MMOL/L (ref 10–25)
ANION GAP SERPL CALC-SCNC: 14 MMOL/L (ref 10–20)
AST SERPL W P-5'-P-CCNC: 18 U/L (ref 9–39)
ATRIAL RATE: 107 BPM
BASE EXCESS BLDV CALC-SCNC: 6.6 MMOL/L (ref -2–3)
BASOPHILS # BLD AUTO: 0.04 X10*3/UL (ref 0–0.1)
BASOPHILS NFR BLD AUTO: 0.4 %
BILIRUB SERPL-MCNC: 0.7 MG/DL (ref 0–1.2)
BNP SERPL-MCNC: 1547 PG/ML (ref 0–99)
BODY TEMPERATURE: ABNORMAL
BUN SERPL-MCNC: 11 MG/DL (ref 6–23)
CA-I BLDV-SCNC: 1.17 MMOL/L (ref 1.1–1.33)
CALCIUM SERPL-MCNC: 8.5 MG/DL (ref 8.6–10.3)
CARDIAC TROPONIN I PNL SERPL HS: 16 NG/L (ref 0–13)
CARDIAC TROPONIN I PNL SERPL HS: 18 NG/L (ref 0–13)
CHLORIDE BLDV-SCNC: 99 MMOL/L (ref 98–107)
CHLORIDE SERPL-SCNC: 97 MMOL/L (ref 98–107)
CO2 SERPL-SCNC: 32 MMOL/L (ref 21–32)
CREAT SERPL-MCNC: 0.7 MG/DL (ref 0.5–1.05)
EGFRCR SERPLBLD CKD-EPI 2021: >90 ML/MIN/1.73M*2
EOSINOPHIL # BLD AUTO: 0.16 X10*3/UL (ref 0–0.7)
EOSINOPHIL NFR BLD AUTO: 1.6 %
ERYTHROCYTE [DISTWIDTH] IN BLOOD BY AUTOMATED COUNT: 13.9 % (ref 11.5–14.5)
FLUAV RNA RESP QL NAA+PROBE: NOT DETECTED
FLUBV RNA RESP QL NAA+PROBE: NOT DETECTED
GLUCOSE BLDV-MCNC: 127 MG/DL (ref 74–99)
GLUCOSE SERPL-MCNC: 131 MG/DL (ref 74–99)
HCO3 BLDV-SCNC: 33.7 MMOL/L (ref 22–26)
HCT VFR BLD AUTO: 48.2 % (ref 36–46)
HCT VFR BLD EST: 45 % (ref 36–46)
HGB BLD-MCNC: 15.9 G/DL (ref 12–16)
HGB BLDV-MCNC: 14.9 G/DL (ref 12–16)
IMM GRANULOCYTES # BLD AUTO: 0.07 X10*3/UL (ref 0–0.7)
IMM GRANULOCYTES NFR BLD AUTO: 0.7 % (ref 0–0.9)
INHALED O2 CONCENTRATION: 21 %
LACTATE BLDV-SCNC: 2 MMOL/L (ref 0.4–2)
LACTATE SERPL-SCNC: 1.6 MMOL/L (ref 0.4–2)
LYMPHOCYTES # BLD AUTO: 0.95 X10*3/UL (ref 1.2–4.8)
LYMPHOCYTES NFR BLD AUTO: 9.5 %
MAGNESIUM SERPL-MCNC: 1.61 MG/DL (ref 1.6–2.4)
MCH RBC QN AUTO: 30.1 PG (ref 26–34)
MCHC RBC AUTO-ENTMCNC: 33 G/DL (ref 32–36)
MCV RBC AUTO: 91 FL (ref 80–100)
MONOCYTES # BLD AUTO: 0.7 X10*3/UL (ref 0.1–1)
MONOCYTES NFR BLD AUTO: 7 %
NEUTROPHILS # BLD AUTO: 8.05 X10*3/UL (ref 1.2–7.7)
NEUTROPHILS NFR BLD AUTO: 80.8 %
NRBC BLD-RTO: 0 /100 WBCS (ref 0–0)
OXYHGB MFR BLDV: 22.6 % (ref 45–75)
P AXIS: 34 DEGREES
P OFFSET: 175 MS
P ONSET: 131 MS
PCO2 BLDV: 57 MM HG (ref 41–51)
PH BLDV: 7.38 PH (ref 7.33–7.43)
PHOSPHATE SERPL-MCNC: 3.2 MG/DL (ref 2.5–4.9)
PLATELET # BLD AUTO: 297 X10*3/UL (ref 150–450)
PO2 BLDV: 20 MM HG (ref 35–45)
POTASSIUM BLDV-SCNC: 4 MMOL/L (ref 3.5–5.3)
POTASSIUM SERPL-SCNC: 3.7 MMOL/L (ref 3.5–5.3)
PR INTERVAL: 146 MS
PROT SERPL-MCNC: 6.7 G/DL (ref 6.4–8.2)
Q ONSET: 204 MS
QRS COUNT: 18 BEATS
QRS DURATION: 96 MS
QT INTERVAL: 328 MS
QTC CALCULATION(BAZETT): 437 MS
QTC FREDERICIA: 397 MS
R AXIS: 128 DEGREES
RBC # BLD AUTO: 5.28 X10*6/UL (ref 4–5.2)
SAO2 % BLDV: 23 % (ref 45–75)
SARS-COV-2 RNA RESP QL NAA+PROBE: NOT DETECTED
SODIUM BLDV-SCNC: 134 MMOL/L (ref 136–145)
SODIUM SERPL-SCNC: 139 MMOL/L (ref 136–145)
T AXIS: -35 DEGREES
T OFFSET: 368 MS
VENTRICULAR RATE: 107 BPM
WBC # BLD AUTO: 10 X10*3/UL (ref 4.4–11.3)

## 2025-05-07 PROCEDURE — 36415 COLL VENOUS BLD VENIPUNCTURE: CPT | Performed by: EMERGENCY MEDICINE

## 2025-05-07 PROCEDURE — 4010F ACE/ARB THERAPY RXD/TAKEN: CPT | Performed by: NURSE PRACTITIONER

## 2025-05-07 PROCEDURE — 94760 N-INVAS EAR/PLS OXIMETRY 1: CPT

## 2025-05-07 PROCEDURE — 2060000001 HC INTERMEDIATE ICU ROOM DAILY

## 2025-05-07 PROCEDURE — 81003 URINALYSIS AUTO W/O SCOPE: CPT | Performed by: EMERGENCY MEDICINE

## 2025-05-07 PROCEDURE — 84484 ASSAY OF TROPONIN QUANT: CPT | Performed by: EMERGENCY MEDICINE

## 2025-05-07 PROCEDURE — 71045 X-RAY EXAM CHEST 1 VIEW: CPT | Performed by: RADIOLOGY

## 2025-05-07 PROCEDURE — 83605 ASSAY OF LACTIC ACID: CPT | Performed by: EMERGENCY MEDICINE

## 2025-05-07 PROCEDURE — 87899 AGENT NOS ASSAY W/OPTIC: CPT | Mod: GEALAB

## 2025-05-07 PROCEDURE — 96365 THER/PROPH/DIAG IV INF INIT: CPT

## 2025-05-07 PROCEDURE — 99223 1ST HOSP IP/OBS HIGH 75: CPT

## 2025-05-07 PROCEDURE — 1124F ACP DISCUSS-NO DSCNMKR DOCD: CPT | Performed by: NURSE PRACTITIONER

## 2025-05-07 PROCEDURE — 2500000005 HC RX 250 GENERAL PHARMACY W/O HCPCS

## 2025-05-07 PROCEDURE — 82435 ASSAY OF BLOOD CHLORIDE: CPT | Performed by: EMERGENCY MEDICINE

## 2025-05-07 PROCEDURE — 71275 CT ANGIOGRAPHY CHEST: CPT

## 2025-05-07 PROCEDURE — 99215 OFFICE O/P EST HI 40 MIN: CPT | Performed by: NURSE PRACTITIONER

## 2025-05-07 PROCEDURE — 93005 ELECTROCARDIOGRAM TRACING: CPT

## 2025-05-07 PROCEDURE — 2550000001 HC RX 255 CONTRASTS: Performed by: EMERGENCY MEDICINE

## 2025-05-07 PROCEDURE — 1036F TOBACCO NON-USER: CPT | Performed by: NURSE PRACTITIONER

## 2025-05-07 PROCEDURE — 2500000004 HC RX 250 GENERAL PHARMACY W/ HCPCS (ALT 636 FOR OP/ED)

## 2025-05-07 PROCEDURE — 71275 CT ANGIOGRAPHY CHEST: CPT | Performed by: STUDENT IN AN ORGANIZED HEALTH CARE EDUCATION/TRAINING PROGRAM

## 2025-05-07 PROCEDURE — 1159F MED LIST DOCD IN RCRD: CPT | Performed by: NURSE PRACTITIONER

## 2025-05-07 PROCEDURE — 87449 NOS EACH ORGANISM AG IA: CPT | Mod: GEALAB

## 2025-05-07 PROCEDURE — 83036 HEMOGLOBIN GLYCOSYLATED A1C: CPT | Mod: GEALAB

## 2025-05-07 PROCEDURE — 3074F SYST BP LT 130 MM HG: CPT | Performed by: NURSE PRACTITIONER

## 2025-05-07 PROCEDURE — 83880 ASSAY OF NATRIURETIC PEPTIDE: CPT | Performed by: EMERGENCY MEDICINE

## 2025-05-07 PROCEDURE — 99291 CRITICAL CARE FIRST HOUR: CPT | Mod: 25 | Performed by: EMERGENCY MEDICINE

## 2025-05-07 PROCEDURE — 2500000004 HC RX 250 GENERAL PHARMACY W/ HCPCS (ALT 636 FOR OP/ED): Mod: JZ | Performed by: EMERGENCY MEDICINE

## 2025-05-07 PROCEDURE — 80053 COMPREHEN METABOLIC PANEL: CPT | Performed by: EMERGENCY MEDICINE

## 2025-05-07 PROCEDURE — 9420000001 HC RT PATIENT EDUCATION 5 MIN

## 2025-05-07 PROCEDURE — 85025 COMPLETE CBC W/AUTO DIFF WBC: CPT | Performed by: EMERGENCY MEDICINE

## 2025-05-07 PROCEDURE — 83735 ASSAY OF MAGNESIUM: CPT | Performed by: EMERGENCY MEDICINE

## 2025-05-07 PROCEDURE — 87040 BLOOD CULTURE FOR BACTERIA: CPT | Mod: GEALAB | Performed by: EMERGENCY MEDICINE

## 2025-05-07 PROCEDURE — 2500000002 HC RX 250 W HCPCS SELF ADMINISTERED DRUGS (ALT 637 FOR MEDICARE OP, ALT 636 FOR OP/ED)

## 2025-05-07 PROCEDURE — 96375 TX/PRO/DX INJ NEW DRUG ADDON: CPT

## 2025-05-07 PROCEDURE — 87636 SARSCOV2 & INF A&B AMP PRB: CPT | Performed by: EMERGENCY MEDICINE

## 2025-05-07 PROCEDURE — 71045 X-RAY EXAM CHEST 1 VIEW: CPT

## 2025-05-07 PROCEDURE — 3079F DIAST BP 80-89 MM HG: CPT | Performed by: NURSE PRACTITIONER

## 2025-05-07 PROCEDURE — 84100 ASSAY OF PHOSPHORUS: CPT

## 2025-05-07 PROCEDURE — 84145 PROCALCITONIN (PCT): CPT | Mod: GEALAB

## 2025-05-07 PROCEDURE — 1126F AMNT PAIN NOTED NONE PRSNT: CPT | Performed by: NURSE PRACTITIONER

## 2025-05-07 PROCEDURE — 5A0945A ASSISTANCE WITH RESPIRATORY VENTILATION, 24-96 CONSECUTIVE HOURS, HIGH NASAL FLOW/VELOCITY: ICD-10-PCS | Performed by: INTERNAL MEDICINE

## 2025-05-07 PROCEDURE — 87081 CULTURE SCREEN ONLY: CPT | Mod: GEALAB

## 2025-05-07 RX ORDER — DEXTROSE 50 % IN WATER (D50W) INTRAVENOUS SYRINGE
25
Status: DISCONTINUED | OUTPATIENT
Start: 2025-05-07 | End: 2025-05-12 | Stop reason: HOSPADM

## 2025-05-07 RX ORDER — FUROSEMIDE 40 MG/1
20 TABLET ORAL DAILY
Status: DISCONTINUED | OUTPATIENT
Start: 2025-05-08 | End: 2025-05-12

## 2025-05-07 RX ORDER — MAGNESIUM SULFATE HEPTAHYDRATE 40 MG/ML
2 INJECTION, SOLUTION INTRAVENOUS ONCE
Status: COMPLETED | OUTPATIENT
Start: 2025-05-07 | End: 2025-05-08

## 2025-05-07 RX ORDER — AMOXICILLIN 250 MG
2 CAPSULE ORAL 2 TIMES DAILY PRN
Status: DISCONTINUED | OUTPATIENT
Start: 2025-05-07 | End: 2025-05-12 | Stop reason: HOSPADM

## 2025-05-07 RX ORDER — ALBUTEROL SULFATE 0.83 MG/ML
2.5 SOLUTION RESPIRATORY (INHALATION) EVERY 6 HOURS PRN
Status: DISCONTINUED | OUTPATIENT
Start: 2025-05-07 | End: 2025-05-07

## 2025-05-07 RX ORDER — FLUTICASONE PROPIONATE 50 MCG
1 SPRAY, SUSPENSION (ML) NASAL DAILY
Status: DISCONTINUED | OUTPATIENT
Start: 2025-05-08 | End: 2025-05-12 | Stop reason: HOSPADM

## 2025-05-07 RX ORDER — PANTOPRAZOLE SODIUM 40 MG/1
40 TABLET, DELAYED RELEASE ORAL
Status: DISCONTINUED | OUTPATIENT
Start: 2025-05-08 | End: 2025-05-12 | Stop reason: HOSPADM

## 2025-05-07 RX ORDER — AZITHROMYCIN 250 MG/1
500 TABLET, FILM COATED ORAL
Status: DISCONTINUED | OUTPATIENT
Start: 2025-05-08 | End: 2025-05-07

## 2025-05-07 RX ORDER — MYCOPHENOLATE MOFETIL 250 MG/1
1000 CAPSULE ORAL 2 TIMES DAILY
Status: DISCONTINUED | OUTPATIENT
Start: 2025-05-07 | End: 2025-05-12 | Stop reason: HOSPADM

## 2025-05-07 RX ORDER — INSULIN LISPRO 100 [IU]/ML
0-5 INJECTION, SOLUTION INTRAVENOUS; SUBCUTANEOUS
Status: DISCONTINUED | OUTPATIENT
Start: 2025-05-08 | End: 2025-05-08

## 2025-05-07 RX ORDER — ACETAMINOPHEN 325 MG/1
1000 TABLET ORAL EVERY 6 HOURS PRN
Status: DISCONTINUED | OUTPATIENT
Start: 2025-05-07 | End: 2025-05-12 | Stop reason: HOSPADM

## 2025-05-07 RX ORDER — PREDNISONE 20 MG/1
40 TABLET ORAL NIGHTLY
Status: DISCONTINUED | OUTPATIENT
Start: 2025-05-07 | End: 2025-05-12 | Stop reason: HOSPADM

## 2025-05-07 RX ORDER — POTASSIUM CHLORIDE 20 MEQ/1
40 TABLET, EXTENDED RELEASE ORAL ONCE
Status: COMPLETED | OUTPATIENT
Start: 2025-05-07 | End: 2025-05-07

## 2025-05-07 RX ORDER — CEFTRIAXONE 1 G/50ML
1 INJECTION, SOLUTION INTRAVENOUS DAILY
Status: DISCONTINUED | OUTPATIENT
Start: 2025-05-08 | End: 2025-05-08

## 2025-05-07 RX ORDER — IPRATROPIUM BROMIDE 0.5 MG/2.5ML
2.5 SOLUTION RESPIRATORY (INHALATION) 4 TIMES DAILY
Status: DISCONTINUED | OUTPATIENT
Start: 2025-05-07 | End: 2025-05-07

## 2025-05-07 RX ORDER — TALC
3 POWDER (GRAM) TOPICAL NIGHTLY PRN
Status: DISCONTINUED | OUTPATIENT
Start: 2025-05-07 | End: 2025-05-12 | Stop reason: HOSPADM

## 2025-05-07 RX ORDER — ALBUTEROL SULFATE 0.83 MG/ML
3 SOLUTION RESPIRATORY (INHALATION)
Status: DISCONTINUED | OUTPATIENT
Start: 2025-05-08 | End: 2025-05-07

## 2025-05-07 RX ORDER — AZITHROMYCIN 250 MG/1
500 TABLET, FILM COATED ORAL
Status: DISCONTINUED | OUTPATIENT
Start: 2025-05-08 | End: 2025-05-08

## 2025-05-07 RX ORDER — IPRATROPIUM BROMIDE AND ALBUTEROL SULFATE 2.5; .5 MG/3ML; MG/3ML
3 SOLUTION RESPIRATORY (INHALATION)
Status: DISCONTINUED | OUTPATIENT
Start: 2025-05-07 | End: 2025-05-12 | Stop reason: HOSPADM

## 2025-05-07 RX ORDER — NAPROXEN SODIUM 220 MG/1
81 TABLET, FILM COATED ORAL DAILY
Status: DISCONTINUED | OUTPATIENT
Start: 2025-05-08 | End: 2025-05-12 | Stop reason: HOSPADM

## 2025-05-07 RX ORDER — ACETAMINOPHEN 325 MG/1
975 TABLET ORAL 4 TIMES DAILY PRN
Status: DISCONTINUED | OUTPATIENT
Start: 2025-05-07 | End: 2025-05-07

## 2025-05-07 RX ORDER — DEXTROSE 50 % IN WATER (D50W) INTRAVENOUS SYRINGE
12.5
Status: DISCONTINUED | OUTPATIENT
Start: 2025-05-07 | End: 2025-05-12 | Stop reason: HOSPADM

## 2025-05-07 RX ORDER — IPRATROPIUM BROMIDE AND ALBUTEROL SULFATE 2.5; .5 MG/3ML; MG/3ML
3 SOLUTION RESPIRATORY (INHALATION)
Status: DISCONTINUED | OUTPATIENT
Start: 2025-05-08 | End: 2025-05-07

## 2025-05-07 RX ORDER — CEFTRIAXONE 2 G/50ML
2 INJECTION, SOLUTION INTRAVENOUS ONCE
Status: COMPLETED | OUTPATIENT
Start: 2025-05-07 | End: 2025-05-07

## 2025-05-07 RX ORDER — FUROSEMIDE 10 MG/ML
40 INJECTION INTRAMUSCULAR; INTRAVENOUS ONCE
Status: COMPLETED | OUTPATIENT
Start: 2025-05-07 | End: 2025-05-07

## 2025-05-07 RX ORDER — ENOXAPARIN SODIUM 100 MG/ML
40 INJECTION SUBCUTANEOUS EVERY 24 HOURS
Status: DISCONTINUED | OUTPATIENT
Start: 2025-05-07 | End: 2025-05-12 | Stop reason: HOSPADM

## 2025-05-07 RX ORDER — ATORVASTATIN CALCIUM 40 MG/1
40 TABLET, FILM COATED ORAL DAILY
Status: DISCONTINUED | OUTPATIENT
Start: 2025-05-08 | End: 2025-05-12 | Stop reason: HOSPADM

## 2025-05-07 RX ORDER — IPRATROPIUM BROMIDE AND ALBUTEROL SULFATE 2.5; .5 MG/3ML; MG/3ML
3 SOLUTION RESPIRATORY (INHALATION) EVERY 2 HOUR PRN
Status: DISCONTINUED | OUTPATIENT
Start: 2025-05-07 | End: 2025-05-12 | Stop reason: HOSPADM

## 2025-05-07 RX ORDER — BUDESONIDE 0.5 MG/2ML
0.5 INHALANT ORAL
Status: DISCONTINUED | OUTPATIENT
Start: 2025-05-08 | End: 2025-05-12 | Stop reason: HOSPADM

## 2025-05-07 RX ORDER — BUDESONIDE 0.5 MG/2ML
0.5 INHALANT ORAL
Status: DISCONTINUED | OUTPATIENT
Start: 2025-05-07 | End: 2025-05-07

## 2025-05-07 RX ADMIN — ENOXAPARIN SODIUM 40 MG: 40 INJECTION SUBCUTANEOUS at 21:51

## 2025-05-07 RX ADMIN — Medication 8 L/MIN: at 21:30

## 2025-05-07 RX ADMIN — FUROSEMIDE 40 MG: 10 INJECTION, SOLUTION INTRAMUSCULAR; INTRAVENOUS at 16:39

## 2025-05-07 RX ADMIN — Medication 8 L/MIN: at 23:43

## 2025-05-07 RX ADMIN — MAGNESIUM SULFATE HEPTAHYDRATE 2 G: 40 INJECTION, SOLUTION INTRAVENOUS at 22:54

## 2025-05-07 RX ADMIN — Medication 3 MG: at 22:53

## 2025-05-07 RX ADMIN — POTASSIUM CHLORIDE 40 MEQ: 1500 TABLET, EXTENDED RELEASE ORAL at 22:53

## 2025-05-07 RX ADMIN — PREDNISONE 40 MG: 20 TABLET ORAL at 22:53

## 2025-05-07 RX ADMIN — IOHEXOL 68 ML: 350 INJECTION, SOLUTION INTRAVENOUS at 17:10

## 2025-05-07 RX ADMIN — AZITHROMYCIN 500 MG: 500 INJECTION, POWDER, LYOPHILIZED, FOR SOLUTION INTRAVENOUS at 17:21

## 2025-05-07 RX ADMIN — CEFTRIAXONE 2 G: 2 INJECTION, SOLUTION INTRAVENOUS at 16:46

## 2025-05-07 SDOH — SOCIAL STABILITY: SOCIAL INSECURITY
WITHIN THE LAST YEAR, HAVE YOU BEEN HUMILIATED OR EMOTIONALLY ABUSED IN OTHER WAYS BY YOUR PARTNER OR EX-PARTNER?: PATIENT DECLINED

## 2025-05-07 SDOH — SOCIAL STABILITY: SOCIAL INSECURITY: ARE YOU OR HAVE YOU BEEN THREATENED OR ABUSED PHYSICALLY, EMOTIONALLY, OR SEXUALLY BY ANYONE?: NO

## 2025-05-07 SDOH — SOCIAL STABILITY: SOCIAL INSECURITY: HAVE YOU HAD THOUGHTS OF HARMING ANYONE ELSE?: NO

## 2025-05-07 SDOH — HEALTH STABILITY: MENTAL HEALTH: HOW OFTEN DO YOU HAVE SIX OR MORE DRINKS ON ONE OCCASION?: NEVER

## 2025-05-07 SDOH — ECONOMIC STABILITY: HOUSING INSECURITY: AT ANY TIME IN THE PAST 12 MONTHS, WERE YOU HOMELESS OR LIVING IN A SHELTER (INCLUDING NOW)?: PATIENT DECLINED

## 2025-05-07 SDOH — SOCIAL STABILITY: SOCIAL INSECURITY
WITHIN THE LAST YEAR, HAVE YOU BEEN KICKED, HIT, SLAPPED, OR OTHERWISE PHYSICALLY HURT BY YOUR PARTNER OR EX-PARTNER?: PATIENT DECLINED

## 2025-05-07 SDOH — SOCIAL STABILITY: SOCIAL INSECURITY: ABUSE: ADULT

## 2025-05-07 SDOH — ECONOMIC STABILITY: FOOD INSECURITY
WITHIN THE PAST 12 MONTHS, YOU WORRIED THAT YOUR FOOD WOULD RUN OUT BEFORE YOU GOT THE MONEY TO BUY MORE.: PATIENT DECLINED

## 2025-05-07 SDOH — ECONOMIC STABILITY: FOOD INSECURITY: WITHIN THE PAST 12 MONTHS, THE FOOD YOU BOUGHT JUST DIDN'T LAST AND YOU DIDN'T HAVE MONEY TO GET MORE.: PATIENT DECLINED

## 2025-05-07 SDOH — ECONOMIC STABILITY: HOUSING INSECURITY: IN THE LAST 12 MONTHS, WAS THERE A TIME WHEN YOU WERE NOT ABLE TO PAY THE MORTGAGE OR RENT ON TIME?: PATIENT DECLINED

## 2025-05-07 SDOH — SOCIAL STABILITY: SOCIAL INSECURITY: WITHIN THE LAST YEAR, HAVE YOU BEEN AFRAID OF YOUR PARTNER OR EX-PARTNER?: PATIENT DECLINED

## 2025-05-07 SDOH — HEALTH STABILITY: MENTAL HEALTH: HOW OFTEN DO YOU HAVE A DRINK CONTAINING ALCOHOL?: NEVER

## 2025-05-07 SDOH — SOCIAL STABILITY: SOCIAL INSECURITY: DOES ANYONE TRY TO KEEP YOU FROM HAVING/CONTACTING OTHER FRIENDS OR DOING THINGS OUTSIDE YOUR HOME?: NO

## 2025-05-07 SDOH — ECONOMIC STABILITY: HOUSING INSECURITY: IN THE PAST 12 MONTHS, HOW MANY TIMES HAVE YOU MOVED WHERE YOU WERE LIVING?: 1

## 2025-05-07 SDOH — ECONOMIC STABILITY: INCOME INSECURITY
IN THE PAST 12 MONTHS HAS THE ELECTRIC, GAS, OIL, OR WATER COMPANY THREATENED TO SHUT OFF SERVICES IN YOUR HOME?: PATIENT DECLINED

## 2025-05-07 SDOH — SOCIAL STABILITY: SOCIAL INSECURITY
WITHIN THE LAST YEAR, HAVE YOU BEEN RAPED OR FORCED TO HAVE ANY KIND OF SEXUAL ACTIVITY BY YOUR PARTNER OR EX-PARTNER?: PATIENT DECLINED

## 2025-05-07 SDOH — SOCIAL STABILITY: SOCIAL INSECURITY: ARE THERE ANY APPARENT SIGNS OF INJURIES/BEHAVIORS THAT COULD BE RELATED TO ABUSE/NEGLECT?: NO

## 2025-05-07 SDOH — SOCIAL STABILITY: SOCIAL INSECURITY: HAS ANYONE EVER THREATENED TO HURT YOUR FAMILY OR YOUR PETS?: NO

## 2025-05-07 SDOH — SOCIAL STABILITY: SOCIAL INSECURITY: HAVE YOU HAD ANY THOUGHTS OF HARMING ANYONE ELSE?: NO

## 2025-05-07 SDOH — HEALTH STABILITY: MENTAL HEALTH: HOW MANY DRINKS CONTAINING ALCOHOL DO YOU HAVE ON A TYPICAL DAY WHEN YOU ARE DRINKING?: PATIENT DOES NOT DRINK

## 2025-05-07 SDOH — SOCIAL STABILITY: SOCIAL INSECURITY: DO YOU FEEL UNSAFE GOING BACK TO THE PLACE WHERE YOU ARE LIVING?: NO

## 2025-05-07 SDOH — ECONOMIC STABILITY: TRANSPORTATION INSECURITY
IN THE PAST 12 MONTHS, HAS LACK OF TRANSPORTATION KEPT YOU FROM MEDICAL APPOINTMENTS OR FROM GETTING MEDICATIONS?: PATIENT DECLINED

## 2025-05-07 SDOH — ECONOMIC STABILITY: FOOD INSECURITY: HOW HARD IS IT FOR YOU TO PAY FOR THE VERY BASICS LIKE FOOD, HOUSING, MEDICAL CARE, AND HEATING?: PATIENT DECLINED

## 2025-05-07 SDOH — SOCIAL STABILITY: SOCIAL INSECURITY: DO YOU FEEL ANYONE HAS EXPLOITED OR TAKEN ADVANTAGE OF YOU FINANCIALLY OR OF YOUR PERSONAL PROPERTY?: NO

## 2025-05-07 SDOH — SOCIAL STABILITY: SOCIAL INSECURITY: WERE YOU ABLE TO COMPLETE ALL THE BEHAVIORAL HEALTH SCREENINGS?: YES

## 2025-05-07 ASSESSMENT — LIFESTYLE VARIABLES
SKIP TO QUESTIONS 9-10: 1
HAVE YOU EVER FELT YOU SHOULD CUT DOWN ON YOUR DRINKING: NO
TOTAL SCORE: 0
HAVE PEOPLE ANNOYED YOU BY CRITICIZING YOUR DRINKING: NO
EVER HAD A DRINK FIRST THING IN THE MORNING TO STEADY YOUR NERVES TO GET RID OF A HANGOVER: NO
EVER FELT BAD OR GUILTY ABOUT YOUR DRINKING: NO
HOW MANY STANDARD DRINKS CONTAINING ALCOHOL DO YOU HAVE ON A TYPICAL DAY: PATIENT DOES NOT DRINK
AUDIT-C TOTAL SCORE: 0

## 2025-05-07 ASSESSMENT — ACTIVITIES OF DAILY LIVING (ADL)
FEEDING YOURSELF: INDEPENDENT
GROOMING: INDEPENDENT
ADEQUATE_TO_COMPLETE_ADL: YES
TOILETING: INDEPENDENT
PATIENT'S MEMORY ADEQUATE TO SAFELY COMPLETE DAILY ACTIVITIES?: YES
DRESSING YOURSELF: INDEPENDENT
WALKS IN HOME: INDEPENDENT
ASSISTIVE_DEVICE: WALKER
BATHING: INDEPENDENT
HEARING - LEFT EAR: FUNCTIONAL
LACK_OF_TRANSPORTATION: PATIENT DECLINED
HEARING - RIGHT EAR: FUNCTIONAL
JUDGMENT_ADEQUATE_SAFELY_COMPLETE_DAILY_ACTIVITIES: YES

## 2025-05-07 ASSESSMENT — ENCOUNTER SYMPTOMS
DYSURIA: 0
OCCASIONAL FEELINGS OF UNSTEADINESS: 0
FATIGUE: 1
DIARRHEA: 0
SHORTNESS OF BREATH: 1
FEVER: 0
APPETITE CHANGE: 1
WHEEZING: 1
COUGH: 1
WOUND: 0
CHEST TIGHTNESS: 1
DIZZINESS: 0
NAUSEA: 0
PALPITATIONS: 1
EYE DISCHARGE: 0
CONFUSION: 0
CHILLS: 1
NUMBNESS: 0
DEPRESSION: 0
FLANK PAIN: 0
SHORTNESS OF BREATH: 1
ABDOMINAL PAIN: 0
COUGH: 1
LOSS OF SENSATION IN FEET: 0
MYALGIAS: 0
DIFFICULTY URINATING: 0
WHEEZING: 1
VOMITING: 0
LIGHT-HEADEDNESS: 1

## 2025-05-07 ASSESSMENT — COGNITIVE AND FUNCTIONAL STATUS - GENERAL
WALKING IN HOSPITAL ROOM: A LITTLE
CLIMB 3 TO 5 STEPS WITH RAILING: A LITTLE
DAILY ACTIVITIY SCORE: 23
MOBILITY SCORE: 22
TOILETING: A LITTLE
PATIENT BASELINE BEDBOUND: NO

## 2025-05-07 ASSESSMENT — PAIN - FUNCTIONAL ASSESSMENT
PAIN_FUNCTIONAL_ASSESSMENT: 0-10
PAIN_FUNCTIONAL_ASSESSMENT: 0-10

## 2025-05-07 ASSESSMENT — PAIN SCALES - GENERAL
PAINLEVEL_OUTOF10: 0-NO PAIN
PAINLEVEL_OUTOF10: 0 - NO PAIN

## 2025-05-07 ASSESSMENT — PATIENT HEALTH QUESTIONNAIRE - PHQ9
1. LITTLE INTEREST OR PLEASURE IN DOING THINGS: NOT AT ALL
SUM OF ALL RESPONSES TO PHQ9 QUESTIONS 1 AND 2: 0
1. LITTLE INTEREST OR PLEASURE IN DOING THINGS: NOT AT ALL
SUM OF ALL RESPONSES TO PHQ9 QUESTIONS 1 & 2: 0
2. FEELING DOWN, DEPRESSED OR HOPELESS: NOT AT ALL
2. FEELING DOWN, DEPRESSED OR HOPELESS: NOT AT ALL

## 2025-05-07 ASSESSMENT — COLUMBIA-SUICIDE SEVERITY RATING SCALE - C-SSRS
6. HAVE YOU EVER DONE ANYTHING, STARTED TO DO ANYTHING, OR PREPARED TO DO ANYTHING TO END YOUR LIFE?: NO
2. HAVE YOU ACTUALLY HAD ANY THOUGHTS OF KILLING YOURSELF?: NO
1. IN THE PAST MONTH, HAVE YOU WISHED YOU WERE DEAD OR WISHED YOU COULD GO TO SLEEP AND NOT WAKE UP?: NO

## 2025-05-07 NOTE — H&P
"Subjective   Subjective:   HPI:  Airam Tyson is a 69 y.o. female with a PMH of HTN, HLD, T2DM, GERD, COPD, DORA, ILD on Nintedanib and MMF, chronic respiratory failure on 4L at baseline, recurrent ophthalmic herpes zoster/shingles, h/o DVT 2021, who presented to [facility] with c/o worsening shortness of breath associated with hypoxia, chest tightness, palpitations, dizziness, lightheadedness, and productive cough with yellow sputum over the past 2 weeks.  Per patient, she went to her PCP and was noted to be hypoxic, saturating 70s to 80s while on 4 L via nasal cannula.  Subsequently she was rushed to the ED for further evaluation treatment.  In the ED, patient was placed on 8 L via nasal cannula.  CT chest was significant for \"interstitial pneumonia form of pulmonary fibrosis\" and she was started on Rocephin/Azithromycin with a dose of Lasix due to elevated BNP.  Patient denies other associated symptoms such as fever, nausea, vomiting, diarrhea, chest pain, abdominal pain.  Patient does not on any bird pets and has only 1 cat.  Lives with her daughter.    Patient follows up with Dr. Jurado, pulmonologist at [facility] for ILD.  Patient was taking nintedanib and MMF for ILD until she acquired ophthalmic herpes zoster flareup after which MMF was stopped and she was recommended to continue just nintedanib alone.  Lung biopsy from 2009 was significant for hypersensitivity pneumonitis after which she was recommended to avoid birds.    ED COURSE:  Vitals:  - /93 ,  , RR 23 , SpO2 92% 4L, T 37.0C  Labs:  - WBC 10, HGB 15.9,   - Na 139, Cl 97, K 3.7, bicarb 32, BUN 11, Cr 0.70  - BNP 1547  - Troponin 18 > 16  - VBG 7.38/57  - Negative Flu/Covid/RSV  Imaging:  - CTA chest showed worsening of usual interstitial pneumonia form of pulmonary fibrosis. Diffuse intrathoracic lymphadenopathy, progressed, possibly reactive on the basis of the aforementioned. Evidence of severe pulmonary hypertension and " "right heart dysfunction with dilated main pulmonary artery, tortuous distal pulmonary arteries, and dilated right heart chambers with marked displacement of the interventricular septum toward the left ventricle. Degree of main pulmonary artery dilatation is progressed from prior study as is the degree of right heart chamber dilatation.   - CXR showed coarsening interstitial markings within bilateral lung fields in particular left lung component which is likely chronic in relation to interstitial lung disease   Interventions:   - IV lasix 40 mg, Rocephin, Azithromycin    PMH: as per HPI.  PSH: Tonsillectomy, lung biopsy, laparoscopy cholecystectomy, , tubal ligation  FHX: COPD in mother  SHX: Denies smoking, drinks alcohol rare, denies illicit drug use  Allergies: eggs, dust, spinach, walnut, fluticasone, levofloxacin, salmeterol, sulfa.  Medications: reviewed.    Code Status: FULL CODE       Objective   ED Course:   Vitals:  BP 92/65   Pulse (!) 110   Temp 37 °C (98.6 °F)   Resp (!) 42   Wt 74.8 kg (165 lb)   SpO2 97%     Labs:  Lab Results   Component Value Date    WBC 10.0 2025    HGB 15.9 2025    HCT 48.2 (H) 2025    MCV 91 2025     2025     Lab Results   Component Value Date    GLUCOSE 131 (H) 2025    CALCIUM 8.5 (L) 2025     2025    K 3.7 2025    CO2 32 2025    CL 97 (L) 2025    BUN 11 2025    CREATININE 0.70 2025     Lab Results   Component Value Date    TROPHS 16 (H) 2025     Lab Results   Component Value Date    BNP 1,547 (H) 2025   No results found for: \"DDIMERVTE\"  Imaging:  CT angio chest for pulmonary embolism   Final Result   1. Worsening of usual interstitial pneumonia form of pulmonary   fibrosis as described above. Pulmonology follow-up recommended.        2. Diffuse intrathoracic lymphadenopathy, progressed, possibly   reactive on the basis of the aforementioned.        3. Evidence of " severe pulmonary hypertension and right heart   dysfunction with dilated main pulmonary artery, tortuous distal   pulmonary arteries, and dilated right heart chambers with marked   displacement of the interventricular septum toward the left   ventricle. Degree of main pulmonary artery dilatation is progressed   from prior study as is the degree of right heart chamber dilatation.        4. No acute pulmonary embolism.        MACRO:   None.        Signed by: Neo Beltran 5/7/2025 5:47 PM   Dictation workstation:   [id number]      XR chest 1 view   Final Result   1. Coarsening interstitial markings within bilateral lung fields in   particular left lung component which is likely chronic in relation to   interstitial lung disease.. However, superimposed component of   infiltrate in particular in the left lung and right lung apex not   excluded.        Signed by: Bimal Sim 5/7/2025 3:14 PM   Dictation workstation:   [id number]         Interventions:  Medications   furosemide (Lasix) injection 40 mg (40 mg intravenous Given 5/7/25 1639)   cefTRIAXone (Rocephin) 2 g in dextrose (iso) IV 50 mL (0 g intravenous Stopped 5/7/25 1722)   azithromycin (Zithromax) 500 mg in dextrose 5%  mL (500 mg intravenous New Bag 5/7/25 1721)   iohexol (OMNIPaque) 350 mg iodine/mL solution 68 mL (68 mL intravenous Given 5/7/25 1710)     Past Medical History:  She has a past medical history of COPD (chronic obstructive pulmonary disease) (Multi), Diabetes mellitus (Multi), GERD (gastroesophageal reflux disease), and Hypertension.    Past Surgical History:  She has no past surgical history on file.    Social History:  She reports that she has never smoked. She has never used smokeless tobacco. She reports that she does not drink alcohol and does not use drugs.    Family History:  Family History[1]  Allergies:  Egg, House dust, Spinach, [place], Fluticasone, Levofloxacin, Salmeterol, and Sulfa (sulfonamide antibiotics)    Home  "Medications:  Prescriptions Prior to Admission[2]  Review Of Systems:  11-point ROS was performed and is negative except as noted below and in the HPI.     Review of Systems   Constitutional:  Positive for chills. Negative for fever.   Eyes:  Negative for discharge.   Respiratory:  Positive for cough (exertional), chest tightness (exertional), shortness of breath and wheezing (exertional).    Cardiovascular:  Positive for palpitations (exertional). Negative for chest pain and leg swelling.   Gastrointestinal:  Negative for abdominal pain, diarrhea, nausea and vomiting.   Genitourinary:  Negative for difficulty urinating, dysuria and flank pain.   Musculoskeletal:  Negative for myalgias.   Skin:  Negative for wound.   Neurological:  Positive for light-headedness. Negative for dizziness and numbness.   Psychiatric/Behavioral:  Negative for confusion.      Objective:     BP 92/65   Pulse (!) 110   Temp 37 °C (98.6 °F)   Resp (!) 42   Ht 1.626 m (5' 4\")   Wt 74.8 kg (165 lb)   SpO2 97%   BMI 28.32 kg/m²     Physical Exam  Constitutional:       General: She is not in acute distress.     Appearance: Normal appearance.   HENT:      Head: Normocephalic and atraumatic.      Nose: Nose normal.      Mouth/Throat:      Mouth: Mucous membranes are moist.   Eyes:      Conjunctiva/sclera: Conjunctivae normal.      Pupils: Pupils are equal, round, and reactive to light.   Cardiovascular:      Rate and Rhythm: Normal rate and regular rhythm.      Heart sounds: Normal heart sounds.   Pulmonary:      Effort: Respiratory distress present.      Breath sounds: Wheezing and rhonchi present.   Abdominal:      General: Bowel sounds are normal. There is no distension.      Palpations: Abdomen is soft.      Tenderness: There is no abdominal tenderness.   Musculoskeletal:         General: No swelling.      Right lower leg: No edema.      Left lower leg: No edema.   Skin:     General: Skin is warm and dry.      Coloration: Skin is not " jaundiced.   Neurological:      General: No focal deficit present.      Mental Status: She is alert. Mental status is at baseline.       Lab Work:     Lab Results   Component Value Date    WBC 10.0 05/07/2025    HGB 15.9 05/07/2025    HCT 48.2 (H) 05/07/2025    MCV 91 05/07/2025     05/07/2025     Lab Results   Component Value Date    GLUCOSE 131 (H) 05/07/2025    CALCIUM 8.5 (L) 05/07/2025     05/07/2025    K 3.7 05/07/2025    CO2 32 05/07/2025    CL 97 (L) 05/07/2025    BUN 11 05/07/2025    CREATININE 0.70 05/07/2025     POC HEMOGLOBIN A1c   Date Value Ref Range Status   06/26/2024 5.7 4.2 - 6.5 % Final   01/17/2024 5.6 4.2 - 6.5 % Final     Hemoglobin A1C   Date Value Ref Range Status   01/06/2022 7.1 (A) % Final     Comment:          Diagnosis of Diabetes-Adults   Non-Diabetic: < or = 5.6%   Increased risk for developing diabetes: 5.7-6.4%   Diagnostic of diabetes: > or = 6.5%  .       Monitoring of Diabetes                Age (y)     Therapeutic Goal (%)   Adults:          >18           <7.0   Pediatrics:    13-18           <7.5                   7-12           <8.0                   0- 6            7.5-8.5   American Diabetes Association. Diabetes Care 33(S1), Jan 2010.       Thyroid Stimulating Hormone   Date Value Ref Range Status   02/22/2022 1.30 0.27 - 4.20 MIU/L Final     Comment:     Performed at [address] [address]     Vitamin D, 25-Hydroxy   Date Value Ref Range Status   08/24/2020 49 31 - 100 ng/mL Final     Comment:     Performed at [address] [address]   03/19/2020 25 (L) 31 - 100 ng/mL Final     Comment:     Performed at [address] [address]   09/18/2019 18 (L) 31 - 100 ng/mL Final     Comment:     Performed at [address] [address]     Cultures:   No results found for the last 90 days.    Images:     CT angio chest for pulmonary embolism   Final Result   1. Worsening of usual  interstitial pneumonia form of pulmonary   fibrosis as described above. Pulmonology follow-up recommended.        2. Diffuse intrathoracic lymphadenopathy, progressed, possibly   reactive on the basis of the aforementioned.        3. Evidence of severe pulmonary hypertension and right heart   dysfunction with dilated main pulmonary artery, tortuous distal   pulmonary arteries, and dilated right heart chambers with marked   displacement of the interventricular septum toward the left   ventricle. Degree of main pulmonary artery dilatation is progressed   from prior study as is the degree of right heart chamber dilatation.        4. No acute pulmonary embolism.        MACRO:   None.        Signed by: Neo Beltran 5/7/2025 5:47 PM   Dictation workstation:   [id number]      XR chest 1 view   Final Result   1. Coarsening interstitial markings within bilateral lung fields in   particular left lung component which is likely chronic in relation to   interstitial lung disease.. However, superimposed component of   infiltrate in particular in the left lung and right lung apex not   excluded.        Signed by: Bimal Sim 5/7/2025 3:14 PM   Dictation workstation:   [id number]         Medications:     Scheduled Meds:  Scheduled Medications[3]Continuous Meds:  Continuous Medications[4]  PRN Meds:  PRN Medications[5]     Assessment & Plan:     Airam Tyson is a 69 y.o. female with a PMH of HTN, HLD, T2DM, GERD, COPD, DORA, ILD on Nintedanib and MMF, chronic respiratory failure on 4L at baseline, recurrent ophthalmic herpes zoster/shingles, h/o DVT 2021, who presented to [facility] with c/o worsening shortness of breath associated with hypoxia, chest tightness, palpitations, dizziness, lightheadedness, and productive cough with yellow sputum over the past 2 weeks. Admitted for acute hypoxic respiratory failure 2/2 ILD flareup, severe pulmonary hypertension, COPD exacerbation, CHF exacerbation, and possible  community-acquired pneumonia.     ACUTE MEDICAL ISSUES:  # Acute hypoxic respiratory failure  # ILD flareup - most likely  # Severe pulmonary HTN  # COPD exacerbation - less likely  # Gram-positive versus negative CAP - less likely  # Decompensated heart failure - less likely  - Prior lung biopsy in 2009 - showed histology consistent with hypersensitivity pneumonitis.   - Patient follows up with Dr. Jurado, pulmonologist at [facility] for ILD.    - Patient was taking nintedanib and MMF for ILD until she acquired ophthalmic herpes zoster flareup   - CTA chest showed worsening of usual interstitial pneumonia form of pulmonary fibrosis with severe pulmonary hypertension and right heart dysfunction with dilated main pulmonary artery.  - Received Lasix 40 IV in ED due to elevated BNP 1500  PLAN:  - Pulmonology consulted  - Continue Rocephin and azithromycin  - Continue prednisone 40 for 5 days, might need longer course  - Continue DuoNeb, Pulmicort, PRN Mucinex and DuoNeb  - ECHO ordered to assess for structural abnormality and right heart failure  - MRSA, urine strep and legionella, procalcitonin ordered  - Blood cultures pending  - Holding Nintedanib (do not carry) and MMF  - RT consult, bronchial hygiene, incentive spirometer  - Wean oxygen down to 4L as tolerated  - 1.5L fluid and 2-3 sodium restriction    # Sinus tachycardia:   - Likely secondary to hypoxia and respiratory failure, less likely from sepsis and pneumonia  PLAN:  - Anticipate improvement with pneumonia and ILD treatment    CHRONIC MEDICAL ISSUES:  #HTN - continue home furosemide 20 mg daily  #HLD - continue home atorvastatin 40 mg  #DORA - continue home CPAP  #GERD - continue pantoprazole 40, at home takes omeprazole  #T2DM - continue sliding scale #1 while inpt, at home takes metformin    Fluid: Replete PRN  Electrolytes: Replete PRN  Nutrition: carb controlled, 1.5L fluid and 2-3 sodium restriction  GI ppx: ppi  DVT/PE ppx: lovenox  Abx:  rocephin/azithro (5/7-)  IV Lines: PIV  O2: 8L NC, wean down to 4L baseline    Dispo: admitted for AHRF 2/2 ILD flare up in the setting of severe pulmonary HTN and possible PNA with CHF vs COPD exacerbation. Estimated length of stay >2 days.    Lakhwinder Pena, PGY-3  Internal Medicine    Disclaimer: Documentation completed with the information available at the time of input. The times in the chart may not be reflective of actual patient care times, interventions, or procedures. Documentation occurs after the physical care of the   patient.         [1]   Family History  Problem Relation Name Age of Onset    Bone cancer Mother      Other (brain bleed) Father     [2] (Not in a hospital admission)  [3] [4] [5]

## 2025-05-07 NOTE — PROGRESS NOTES
Subjective   Patient ID: Airam Tyson is a 69 y.o. female who presents for Leg Swelling (Leg/foot swelling.) and Cough.    Presents today with concerns for lower leg swelling and difficulty breathing for 2-3 weeks. Pulse ox on 4 liters/NC  is only 84-85%  She denies chest pain. She admits to extreme shortness of breath.  She can only speak 2-3 words at a time due to shortness of breath.  She admits to coughing up thick yellow secretions. She denies elevated temperature. She admits to edema to her lower legs for the past 2 weeks.  She reports trying to elevate her legs.  Patient continues to be excessively short of breath.  Increased oxygen to 5 L per nasal cannula without improvement in oxygenation.  After discussion regarding risks and benefits patient finally agreeable to transport to emergency room for further evaluation.  911 called.  Care assumed by EMS personnel.         Review of Systems   Constitutional:  Positive for appetite change and fatigue.   Respiratory:  Positive for cough, shortness of breath and wheezing.        Objective   /82 (Patient Position: Sitting)   Pulse (!) 126   Temp 36.7 °C (98.1 °F) (Temporal)   SpO2 (!) 85%     Physical Exam  Vitals reviewed.   Constitutional:       General: She is not in acute distress.     Appearance: Normal appearance.   HENT:      Head: Normocephalic.      Nose: Nose normal.   Eyes:      Conjunctiva/sclera: Conjunctivae normal.   Cardiovascular:      Rate and Rhythm: Regular rhythm. Tachycardia present.      Heart sounds: Normal heart sounds.   Pulmonary:      Effort: Respiratory distress present.      Breath sounds: Wheezing and rhonchi present.   Abdominal:      Palpations: Abdomen is soft.   Musculoskeletal:         General: Normal range of motion.   Skin:     General: Skin is warm and dry.   Neurological:      Mental Status: She is alert and oriented to person, place, and time.   Psychiatric:         Mood and Affect: Mood normal.          Speech: Speech normal.         Assessment/Plan   Problem List Items Addressed This Visit           ICD-10-CM    Essential hypertension - Primary I10    Venous insufficiency of both lower extremities I87.2    Interstitial lung disease (Multi) J84.9    Type 2 diabetes mellitus with hyperglycemia (Multi) E11.65    Pulmonary hypertension due to lung disease (Multi) I27.23

## 2025-05-07 NOTE — PROGRESS NOTES
Pharmacy Medication History Review    Airam Tyson is a 69 y.o. female admitted for No Principal Problem: There is no principal problem currently on the Problem List. Please update the Problem List and refresh.. Pharmacy reviewed the patient's adibb-nz-zoqbbckoc medications and allergies for accuracy.    The list below reflectives the updated PTA list. Please review each medication in order reconciliation for additional clarification and justification.  Prior to Admission Medications   Prescriptions Last Dose Informant Patient Reported? Taking?   Eliquis 5 mg tablet Not Taking Self No No   Sig: Take 1 tablet (5 mg) by mouth 2 times a day.   Patient not taking: Reported on 5/7/2025   FREESTYLE LANCETS MISC  Self     Sig: every 12 hours.   Mucus DM Max ER 60-1,200 mg tablet extended release 12 hr Not Taking Self No No   Sig: TAKE 1 TABLET BY MOUTH EVERY 12 HOURS AS NEEDED   Patient not taking: Reported on 5/7/2025   OneTouch Verio test strips strip  Self     Sig: USE TWICE A DAY AS DIRECTED   acetaminophen (Tylenol) 500 mg tablet 5/6/2025 Evening Self Yes Yes   Sig: Take 2 tablets (1,000 mg) by mouth every 6 hours if needed.   albuterol 2.5 mg /3 mL (0.083 %) nebulizer solution 5/6/2025 Self Yes Yes   Sig: Take 3 mL (2.5 mg) by nebulization every 6 hours if needed for wheezing or shortness of breath.   albuterol 90 mcg/actuation inhaler 5/6/2025 Self Yes Yes   Sig: INHALE 1 PUFF AS NEEDED EVERY 4 HOURS   aspirin 81 mg chewable tablet 5/7/2025 Morning Self Yes Yes   atorvastatin (Lipitor) 40 mg tablet Unknown Self unknown unknown   Sig: TAKE 1 TABLET BY MOUTH EVERY DAY   fluticasone (Flonase) 50 mcg/actuation nasal spray Past Month Self Yes Yes   Sig: ADMINISTER 1 SPRAY INTO EACH NOSTRIL ONCE DAILY.   fluticasone-umeclidin-vilanter (Trelegy Ellipta) 200-62.5-25 mcg blister with device 5/6/2025 Self Yes Yes   Sig: Inhale 1 puff once daily.   furosemide (Lasix) 20 mg tablet 5/7/2025 Morning Self Yes Yes   Sig:  Take 1 tablet (20 mg) by mouth once daily.   gabapentin (Neurontin) 300 mg capsule Not Taking Self No No   Sig: TAKE 1 CAPSULE BY MOUTH TWICE A DAY   Patient not taking: Reported on 5/7/2025   ipratropium (Atrovent) 0.02 % nebulizer solution 5/6/2025 Self Yes Yes   Sig: Take 2.5 mL (0.5 mg) by nebulization 4 times a day.   lisinopril 10 mg tablet Not Taking Self unknown unknown   Sig: Take 1 tablet (10 mg) by mouth once daily.   Patient not taking: Reported on 5/7/2025   metFORMIN  mg 24 hr tablet 5/7/2025 Morning Self Yes Yes   Sig: Take 1 tablet (500 mg) by mouth 2 times a day.   mycophenolate (Cellcept) 500 mg tablet Past Month Self Yes Yes   Sig: Take 2 tablets (1,000 mg) by mouth 2 times a day.   nintedanib (Ofev) 100 mg capsule capsule 5/7/2025 Morning Self Yes Yes   Sig: Take 1 capsule (100 mg) by mouth twice a day.   omeprazole (PriLOSEC) 20 mg DR capsule 5/7/2025 Morning Self Yes Yes   Sig: TAKE 1 CAPSULE BY MOUTH EVERY DAY   oxygen (O2) gas therapy  Self     Sig: as directed      Facility-Administered Medications: None            The list below reflectives the updated allergy list. Please review each documented allergy for additional clarification and justification.  Allergies  Reviewed by Kamille Warner MA on 5/7/2025        Severity Reactions Comments    Egg Not Specified Unknown plugged ears    House Dust Not Specified Other     Spinach Not Specified Unknown spinach---plugged ears    Rodney Not Specified Unknown     Fluticasone Low Unknown, Itching Unknown    Has tolerated breo and prednisone in the past    No severe allergy    Levofloxacin Low Itching, Rash     Salmeterol Low Unknown, Itching     Sulfa (sulfonamide Antibiotics) Low Unknown, Rash             Below are additional concerns with the patient's PTA list.      Chani Araiza

## 2025-05-07 NOTE — ED PROCEDURE NOTE
Procedure  Critical Care    Performed by: Judy Etienne DO  Authorized by: Judy Etienne DO    Critical care provider statement:     Critical care time (minutes):  55    Critical care time was exclusive of:  Separately billable procedures and treating other patients    Critical care was necessary to treat or prevent imminent or life-threatening deterioration of the following conditions:  Respiratory failure    Critical care was time spent personally by me on the following activities:  Blood draw for specimens, ordering and performing treatments and interventions, ordering and review of laboratory studies, ordering and review of radiographic studies, pulse oximetry, discussions with primary provider, discussions with consultants, development of treatment plan with patient or surrogate, evaluation of patient's response to treatment, re-evaluation of patient's condition, examination of patient, review of old charts and obtaining history from patient or surrogate    Care discussed with: admitting provider                 Judy Etienne DO  05/07/25 1800

## 2025-05-07 NOTE — ED PROVIDER NOTES
HPI   Chief Complaint   Patient presents with    Shortness of Breath     Pt has chronic SOB from interstitial lung disease and CHF hx.        69-year-old female coming from the doctor's office via EMS for chief complaint of shortness of breath.  She has a history of chronic interstitial lung disease she is on 4 L chronically and states she has been short of breath for quite some time now.  She cannot really quantify when this started.  She also has had past medical history of diabetes hypertension chronic venous insufficiency.  She states her legs are huge.  She is on Lasix but is not working she states.  She cannot get up the steps to go to the bathroom she has 13 steps in the home.  She denies any chest pain fevers chills or night sweats.              Patient History   Medical History[1]  Surgical History[2]  Family History[3]  Social History[4]    Physical Exam   ED Triage Vitals   Temp Pulse Resp BP   -- -- -- --      SpO2 Temp src Heart Rate Source Patient Position   -- -- -- --      BP Location FiO2 (%)     -- --       Physical Exam  Vitals and nursing note reviewed.   Constitutional:       Appearance: She is ill-appearing.   HENT:      Head: Normocephalic and atraumatic.      Nose: Nose normal.      Mouth/Throat:      Mouth: Mucous membranes are moist.   Eyes:      Extraocular Movements: Extraocular movements intact.      Pupils: Pupils are equal, round, and reactive to light.   Cardiovascular:      Rate and Rhythm: Regular rhythm. Tachycardia present.   Pulmonary:      Effort: Pulmonary effort is normal.      Breath sounds: Rales present.   Abdominal:      General: Abdomen is flat.      Palpations: Abdomen is soft.   Musculoskeletal:         General: Swelling present.      Cervical back: Normal range of motion.      Comments: She has 4+ pitting edema bilaterally in the lower extremities   Skin:     General: Skin is warm and dry.      Capillary Refill: Capillary refill takes 2 to 3 seconds.   Neurological:       General: No focal deficit present.      Mental Status: She is alert.   Psychiatric:         Mood and Affect: Mood normal.           ED Course & MDM   ED Course as of 05/07/25 1759   Wed May 07, 2025   1456 EKG interpreted by me shows a heart rate of 107 sinus tachycardia with a RVH present incomplete right bundle branch block present, ST and T wave abnormalities in the inferior leads as well as the anterior lateral leads there is ST depressions and T wave inversions. [TP]   1635 Patient keeps dropping to 80% on 4 L.  hiflow will be added. 8 L total.  [TP]   1635 Lasix ordered for BNP and clinical fluid overload [TP]   1751 PRESSION:  1. Worsening of usual interstitial pneumonia form of pulmonary  fibrosis as described above. Pulmonology follow-up recommended.      2. Diffuse intrathoracic lymphadenopathy, progressed, possibly  reactive on the basis of the aforementioned.      3. Evidence of severe pulmonary hypertension and right heart  dysfunction with dilated main pulmonary artery, tortuous distal  pulmonary arteries, and dilated right heart chambers with marked  displacement of the interventricular septum toward the left  ventricle. Degree of main pulmonary artery dilatation is progressed  from prior study as is the degree of right heart chamber dilatation.      4. No acute pulmonary embolism.       [TP]   1751 This patient will be hospitalized for further management and treatment at this time.  Differential includes COVID flu pneumonia RSV PE [TP]   1751 Consider cardiac echo [TP]      ED Course User Index  [TP] Judy Etienne DO         Diagnoses as of 05/07/25 1759   Acute respiratory failure with hypoxia   Pneumonia of both lungs due to infectious organism, unspecified part of lung   Shortness of breath                 No data recorded     Shai Coma Scale Score: 15 (05/07/25 1500 : Gregor Kang RN)                           Medical Decision Making      Procedure  Procedures       [1]    Past Medical History:  Diagnosis Date    COPD (chronic obstructive pulmonary disease) (Multi)     Diabetes mellitus (Multi)     GERD (gastroesophageal reflux disease)     Hypertension    [2] No past surgical history on file.  [3]   Family History  Problem Relation Name Age of Onset    Bone cancer Mother      Other (brain bleed) Father     [4]   Social History  Tobacco Use    Smoking status: Never    Smokeless tobacco: Never   Vaping Use    Vaping status: Never Used   Substance Use Topics    Alcohol use: Never    Drug use: Never        Judy Etienne DO  05/07/25 1800

## 2025-05-08 ENCOUNTER — APPOINTMENT (OUTPATIENT)
Dept: CARDIOLOGY | Facility: HOSPITAL | Age: 70
DRG: 189 | End: 2025-05-08
Payer: MEDICARE

## 2025-05-08 LAB
ALBUMIN SERPL BCP-MCNC: 3.1 G/DL (ref 3.4–5)
ALP SERPL-CCNC: 46 U/L (ref 33–136)
ALT SERPL W P-5'-P-CCNC: 18 U/L (ref 7–45)
ANION GAP SERPL CALC-SCNC: 13 MMOL/L (ref 10–20)
APPEARANCE UR: CLEAR
AST SERPL W P-5'-P-CCNC: 12 U/L (ref 9–39)
BACTERIA SPEC RESP CULT: ABNORMAL
BILIRUB SERPL-MCNC: 0.4 MG/DL (ref 0–1.2)
BILIRUB UR STRIP.AUTO-MCNC: NEGATIVE MG/DL
BUN SERPL-MCNC: 13 MG/DL (ref 6–23)
CALCIUM SERPL-MCNC: 8.2 MG/DL (ref 8.6–10.3)
CHLORIDE SERPL-SCNC: 99 MMOL/L (ref 98–107)
CO2 SERPL-SCNC: 31 MMOL/L (ref 21–32)
COLOR UR: NORMAL
CREAT SERPL-MCNC: 0.63 MG/DL (ref 0.5–1.05)
EGFRCR SERPLBLD CKD-EPI 2021: >90 ML/MIN/1.73M*2
ERYTHROCYTE [DISTWIDTH] IN BLOOD BY AUTOMATED COUNT: 13.7 % (ref 11.5–14.5)
EST. AVERAGE GLUCOSE BLD GHB EST-MCNC: 146 MG/DL
GLUCOSE BLD MANUAL STRIP-MCNC: 145 MG/DL (ref 74–99)
GLUCOSE BLD MANUAL STRIP-MCNC: 170 MG/DL (ref 74–99)
GLUCOSE BLD MANUAL STRIP-MCNC: 175 MG/DL (ref 74–99)
GLUCOSE BLD MANUAL STRIP-MCNC: 178 MG/DL (ref 74–99)
GLUCOSE SERPL-MCNC: 162 MG/DL (ref 74–99)
GLUCOSE UR STRIP.AUTO-MCNC: NORMAL MG/DL
GRAM STN SPEC: ABNORMAL
HBA1C MFR BLD: 6.7 % (ref ?–5.7)
HCT VFR BLD AUTO: 41.5 % (ref 36–46)
HGB BLD-MCNC: 13.5 G/DL (ref 12–16)
HOLD SPECIMEN: 293
HOLD SPECIMEN: NORMAL
KETONES UR STRIP.AUTO-MCNC: NEGATIVE MG/DL
LEGIONELLA AG UR QL: NEGATIVE
LEUKOCYTE ESTERASE UR QL STRIP.AUTO: NEGATIVE
MAGNESIUM SERPL-MCNC: 2.15 MG/DL (ref 1.6–2.4)
MCH RBC QN AUTO: 30.1 PG (ref 26–34)
MCHC RBC AUTO-ENTMCNC: 32.5 G/DL (ref 32–36)
MCV RBC AUTO: 92 FL (ref 80–100)
NITRITE UR QL STRIP.AUTO: NEGATIVE
NRBC BLD-RTO: 0 /100 WBCS (ref 0–0)
PH UR STRIP.AUTO: 6 [PH]
PHOSPHATE SERPL-MCNC: 4.2 MG/DL (ref 2.5–4.9)
PLATELET # BLD AUTO: 230 X10*3/UL (ref 150–450)
POTASSIUM SERPL-SCNC: 4.5 MMOL/L (ref 3.5–5.3)
PROCALCITONIN SERPL-MCNC: 0.04 NG/ML
PROT SERPL-MCNC: 5.5 G/DL (ref 6.4–8.2)
PROT UR STRIP.AUTO-MCNC: NEGATIVE MG/DL
RBC # BLD AUTO: 4.49 X10*6/UL (ref 4–5.2)
RBC # UR STRIP.AUTO: NEGATIVE MG/DL
S PNEUM AG UR QL: NEGATIVE
SODIUM SERPL-SCNC: 138 MMOL/L (ref 136–145)
SP GR UR STRIP.AUTO: 1.03
UROBILINOGEN UR STRIP.AUTO-MCNC: NORMAL MG/DL
WBC # BLD AUTO: 4.2 X10*3/UL (ref 4.4–11.3)

## 2025-05-08 PROCEDURE — 93306 TTE W/DOPPLER COMPLETE: CPT

## 2025-05-08 PROCEDURE — 97161 PT EVAL LOW COMPLEX 20 MIN: CPT | Mod: GP

## 2025-05-08 PROCEDURE — 2500000001 HC RX 250 WO HCPCS SELF ADMINISTERED DRUGS (ALT 637 FOR MEDICARE OP): Performed by: INTERNAL MEDICINE

## 2025-05-08 PROCEDURE — 87205 SMEAR GRAM STAIN: CPT | Mod: GEALAB

## 2025-05-08 PROCEDURE — 99223 1ST HOSP IP/OBS HIGH 75: CPT | Performed by: INTERNAL MEDICINE

## 2025-05-08 PROCEDURE — 82947 ASSAY GLUCOSE BLOOD QUANT: CPT

## 2025-05-08 PROCEDURE — 2500000004 HC RX 250 GENERAL PHARMACY W/ HCPCS (ALT 636 FOR OP/ED): Mod: JZ

## 2025-05-08 PROCEDURE — 94640 AIRWAY INHALATION TREATMENT: CPT

## 2025-05-08 PROCEDURE — 94760 N-INVAS EAR/PLS OXIMETRY 1: CPT

## 2025-05-08 PROCEDURE — 85027 COMPLETE CBC AUTOMATED: CPT

## 2025-05-08 PROCEDURE — 2500000005 HC RX 250 GENERAL PHARMACY W/O HCPCS: Performed by: INTERNAL MEDICINE

## 2025-05-08 PROCEDURE — 36415 COLL VENOUS BLD VENIPUNCTURE: CPT

## 2025-05-08 PROCEDURE — 94664 DEMO&/EVAL PT USE INHALER: CPT

## 2025-05-08 PROCEDURE — 2500000001 HC RX 250 WO HCPCS SELF ADMINISTERED DRUGS (ALT 637 FOR MEDICARE OP)

## 2025-05-08 PROCEDURE — 99232 SBSQ HOSP IP/OBS MODERATE 35: CPT | Performed by: INTERNAL MEDICINE

## 2025-05-08 PROCEDURE — 83735 ASSAY OF MAGNESIUM: CPT

## 2025-05-08 PROCEDURE — 97165 OT EVAL LOW COMPLEX 30 MIN: CPT | Mod: GO

## 2025-05-08 PROCEDURE — 2060000001 HC INTERMEDIATE ICU ROOM DAILY

## 2025-05-08 PROCEDURE — 80053 COMPREHEN METABOLIC PANEL: CPT

## 2025-05-08 PROCEDURE — 2500000002 HC RX 250 W HCPCS SELF ADMINISTERED DRUGS (ALT 637 FOR MEDICARE OP, ALT 636 FOR OP/ED)

## 2025-05-08 PROCEDURE — 2500000005 HC RX 250 GENERAL PHARMACY W/O HCPCS

## 2025-05-08 PROCEDURE — 84100 ASSAY OF PHOSPHORUS: CPT

## 2025-05-08 PROCEDURE — 93306 TTE W/DOPPLER COMPLETE: CPT | Performed by: STUDENT IN AN ORGANIZED HEALTH CARE EDUCATION/TRAINING PROGRAM

## 2025-05-08 RX ORDER — BENZONATATE 100 MG/1
200 CAPSULE ORAL 3 TIMES DAILY
Status: DISCONTINUED | OUTPATIENT
Start: 2025-05-08 | End: 2025-05-12 | Stop reason: HOSPADM

## 2025-05-08 RX ORDER — HYDROCODONE BITARTRATE AND HOMATROPINE METHYLBROMIDE ORAL SOLUTION 5; 1.5 MG/5ML; MG/5ML
5 LIQUID ORAL EVERY 6 HOURS PRN
Refills: 0 | Status: DISCONTINUED | OUTPATIENT
Start: 2025-05-08 | End: 2025-05-12 | Stop reason: HOSPADM

## 2025-05-08 RX ORDER — INSULIN LISPRO 100 [IU]/ML
0-10 INJECTION, SOLUTION INTRAVENOUS; SUBCUTANEOUS
Status: DISCONTINUED | OUTPATIENT
Start: 2025-05-08 | End: 2025-05-12 | Stop reason: HOSPADM

## 2025-05-08 RX ADMIN — INSULIN LISPRO 1 UNITS: 100 INJECTION, SOLUTION INTRAVENOUS; SUBCUTANEOUS at 09:11

## 2025-05-08 RX ADMIN — INSULIN LISPRO 1 UNITS: 100 INJECTION, SOLUTION INTRAVENOUS; SUBCUTANEOUS at 11:56

## 2025-05-08 RX ADMIN — BENZONATATE 200 MG: 100 CAPSULE ORAL at 13:12

## 2025-05-08 RX ADMIN — ASPIRIN 81 MG: 81 TABLET, CHEWABLE ORAL at 09:11

## 2025-05-08 RX ADMIN — ENOXAPARIN SODIUM 40 MG: 40 INJECTION SUBCUTANEOUS at 20:32

## 2025-05-08 RX ADMIN — IPRATROPIUM BROMIDE AND ALBUTEROL SULFATE 3 ML: 2.5; .5 SOLUTION RESPIRATORY (INHALATION) at 19:52

## 2025-05-08 RX ADMIN — PREDNISONE 40 MG: 20 TABLET ORAL at 20:18

## 2025-05-08 RX ADMIN — FUROSEMIDE 20 MG: 40 TABLET ORAL at 09:11

## 2025-05-08 RX ADMIN — CEFTRIAXONE 1 G: 1 INJECTION, SOLUTION INTRAVENOUS at 09:11

## 2025-05-08 RX ADMIN — GUAIFENESIN, DEXTROMETHORPHAN HBR 1 TABLET: 600; 30 TABLET ORAL at 20:18

## 2025-05-08 RX ADMIN — PANTOPRAZOLE SODIUM 40 MG: 40 TABLET, DELAYED RELEASE ORAL at 06:08

## 2025-05-08 RX ADMIN — FLUTICASONE PROPIONATE 1 SPRAY: 50 SPRAY, METERED NASAL at 09:11

## 2025-05-08 RX ADMIN — IPRATROPIUM BROMIDE AND ALBUTEROL SULFATE 3 ML: 2.5; .5 SOLUTION RESPIRATORY (INHALATION) at 10:08

## 2025-05-08 RX ADMIN — Medication 8 L/MIN: at 04:05

## 2025-05-08 RX ADMIN — GUAIFENESIN, DEXTROMETHORPHAN HBR 1 TABLET: 600; 30 TABLET ORAL at 13:12

## 2025-05-08 RX ADMIN — AZITHROMYCIN 500 MG: 250 TABLET, FILM COATED ORAL at 09:11

## 2025-05-08 RX ADMIN — Medication 6 L/MIN: at 23:44

## 2025-05-08 RX ADMIN — BENZONATATE 200 MG: 100 CAPSULE ORAL at 20:18

## 2025-05-08 RX ADMIN — BUDESONIDE 0.5 MG: 0.5 INHALANT ORAL at 10:08

## 2025-05-08 RX ADMIN — Medication 8 L/MIN: at 10:08

## 2025-05-08 RX ADMIN — BUDESONIDE 0.5 MG: 0.5 INHALANT ORAL at 19:51

## 2025-05-08 RX ADMIN — ATORVASTATIN CALCIUM 40 MG: 40 TABLET, FILM COATED ORAL at 09:11

## 2025-05-08 ASSESSMENT — PAIN SCALES - GENERAL
PAINLEVEL_OUTOF10: 0 - NO PAIN

## 2025-05-08 ASSESSMENT — ACTIVITIES OF DAILY LIVING (ADL)
ADLS_ADDRESSED: YES
ADL_ASSISTANCE: INDEPENDENT
LACK_OF_TRANSPORTATION: NO
ADL_ASSISTANCE: INDEPENDENT
BATHING_ASSISTANCE: STAND BY

## 2025-05-08 ASSESSMENT — COGNITIVE AND FUNCTIONAL STATUS - GENERAL
HELP NEEDED FOR BATHING: A LITTLE
STANDING UP FROM CHAIR USING ARMS: A LITTLE
CLIMB 3 TO 5 STEPS WITH RAILING: A LITTLE
WALKING IN HOSPITAL ROOM: A LITTLE
CLIMB 3 TO 5 STEPS WITH RAILING: A LITTLE
MOVING FROM LYING ON BACK TO SITTING ON SIDE OF FLAT BED WITH BEDRAILS: A LITTLE
TOILETING: A LITTLE
WALKING IN HOSPITAL ROOM: A LITTLE
TURNING FROM BACK TO SIDE WHILE IN FLAT BAD: A LITTLE
MOBILITY SCORE: 22
MOBILITY SCORE: 18
MOVING TO AND FROM BED TO CHAIR: A LITTLE
DAILY ACTIVITIY SCORE: 23
TOILETING: A LITTLE
DAILY ACTIVITIY SCORE: 22

## 2025-05-08 ASSESSMENT — PAIN - FUNCTIONAL ASSESSMENT
PAIN_FUNCTIONAL_ASSESSMENT: 0-10

## 2025-05-08 NOTE — PROGRESS NOTES
Physical Therapy    Physical Therapy Evaluation    Patient Name: Airam Tyson  MRN: [id number]  Department: [address]  Room: [address] [place] [address]  Today's Date: 5/8/2025   Time Calculation  Start Time: 1237  Stop Time: 1253  Time Calculation (min): 16 min    Assessment/Plan   PT Assessment  PT Assessment Results: Decreased mobility, Decreased endurance  Rehab Prognosis: Good  Barriers to Discharge Home: No anticipated barriers  Evaluation/Treatment Tolerance: Patient limited by fatigue (+SOB)  Medical Staff Made Aware: Yes  End of Session Communication: Bedside nurse  End of Session Patient Position: Bed, 3 rail up, Alarm on    Assessment:   Pt is a 68 y/o F presenting for PT eval with SOB. Pt is mobilizing well, despite sx, needing only supervision. However, did note desat to 87% after ambulating to bathroom. Notified RN. Pt will benefit from low intensity PT services for continued mobility and endurance training.         IP OR SWING BED PT PLAN  Inpatient or Swing Bed: Inpatient  PT Plan  Treatment/Interventions: Bed mobility, Transfer training, Gait training, Endurance training, Positioning  PT Plan: Ongoing PT  PT Frequency: 3 times per week  PT Discharge Recommendations: Low intensity level of continued care  PT Recommended Transfer Status: Stand by assist (no AD)  PT - OK to Discharge: Yes    Subjective   General Visit Information:  General  Reason for Referral: Pt is a 70 yo F admitted to [facility] on 5/7/25 with acute hypoxic respiratory failure 2/2 ILD flareup, severe pulmonary HTN, COPD/CHF exacerbation, and possible CAP. PT consulted for impaired mobility.  Past Medical History Relevant to Rehab: HTN, HLD, T2DM, GERD, COPD, DORA, ILD (on Nintedanib and MMF), chronic respiratory failure (on 4L baseline), recurrent ophthalmic herpes zoster/shingles, h/o DVT 2021  Family/Caregiver Present: No  Co-Treatment: OT  Co-Treatment Reason: To maximize pt safety 2/2 increased O2 needs/SOB  Prior to Session Communication: Bedside  nurse  Patient Position Received: Bed, 3 rail up, Alarm on  General Comment: Pt pleasant and cooperative with PT eval.  Home Living:  Home Living  Type of Home: House  Lives With: Adult children (Daughter)  Home Adaptive Equipment: Cane (Rollator)  Home Layout: Two level, Bed/bath upstairs, Stairs to alternate level with rails  Alternate Level Stairs-Rails: Right  Alternate Level Stairs-Number of Steps: 14  Home Access: Stairs to enter without rails  Entrance Stairs-Number of Steps: 1  Bathroom Shower/Tub: Tub/shower unit  Bathroom Equipment: Shower chair with back  Home Living Comments: Pt uses BSC on main level (in laundry room)  Prior Level of Function:  Prior Function Per Pt/Caregiver Report  Level of [place]: Independent with ADLs and functional transfers, Needs assistance with homemaking  Receives Help From: Family  ADL Assistance: Independent  Homemaking Assistance: Needs assistance (daughter completes)  Ambulatory Assistance: Independent (with rollator PRN)  Precautions:  Precautions  Medical Precautions: Fall precautions, Oxygen therapy device and L/min (6L via HFNC)  Precautions Comment: Tele with pulse ox      Date/Time Vitals Session Patient Position Pulse Resp SpO2 BP MAP (mmHg)    05/08/25 1237 During PT  Sitting  106  --  93 %  --  --     05/08/25 1238 During PT  Ambulating  --  --  87 %  --  --     05/08/25 1239 Post PT  --  118  --  93 %  --  --           Vital Signs Comment: Bed>bathroom     Objective   Pain:  Pain Assessment  Pain Assessment: 0-10  0-10 (Numeric) Pain Score: 0 - No pain  Cognition:  Cognition  Overall Cognitive Status: Within Functional Limits    General Assessments:  General Observation  General Observation: 4+ pitting edema BLE               Activity Tolerance  Endurance: Tolerates less than 10 min exercise with changes in vital signs (desat to 87% with ambulation)    Sensation  Light Touch: No apparent deficits    Strength  Strength Comments: BLE at least 3/5  Static  Sitting Balance  Static Sitting-Balance Support: Feet supported, No upper extremity supported  Static Sitting-Level of Assistance: Independent  Static Sitting-Comment/Number of Minutes: EOB    Static Standing Balance  Static Standing-Balance Support: No upper extremity supported  Static Standing-Level of Assistance: Close supervision  Static Standing-Comment/Number of Minutes: no AD  Functional Assessments:  ADL  ADL's Addressed: Yes  ADL Comments: OT present - instructed in dressing, toileting/hygiene, hand washing at sink    Bed Mobility  Bed Mobility: Yes  Bed Mobility 1  Bed Mobility 1: Supine to sitting, Sitting to supine  Level of Assistance 1: Close supervision  Bed Mobility Comments 1: HOB elevated; relies on momentum to achieve upright/to lift LE into bed  Bed Mobility 2  Bed Mobility  2: Scooting  Level of Assistance 2: Independent  Bed Mobility Comments 2: forward at EOB    Transfers  Transfer: Yes  Transfer 1  Technique 1: Sit to stand, Stand to sit  Transfer Level of Assistance 1: Close supervision  Trials/Comments 1: EOB  Transfers 2  Technique 2: Sit to stand, Stand to sit  Transfer Level of Assistance 2: Close supervision  Trials/Comments 2: toilet transfer    Ambulation/Gait Training  Ambulation/Gait Training Performed: Yes  Ambulation/Gait Training 1  Surface 1: Level tile  Device 1: No device  Assistance 1: Close supervision  Quality of Gait 1: Narrow base of support, Decreased step length  Comments/Distance (ft) 1: 15' x2; increased SOB with activity  Extremity/Trunk Assessments:  RLE   RLE : Within Functional Limits  LLE   LLE : Within Functional Limits  Outcome Measures:  [facility] Basic Mobility  Turning from your back to your side while in a flat bed without using bedrails: A little  Moving from lying on your back to sitting on the side of a flat bed without using bedrails: A little  Moving to and from bed to chair (including a wheelchair): A little  Standing up from a chair using your arms (e.g.  wheelchair or bedside chair): A little  To walk in hospital room: A little  Climbing 3-5 steps with railing: A little  Basic Mobility - Total Score: 18    Encounter Problems       Encounter Problems (Active)       Endurance       Patient will tolerate standing for 15-20 minutes with minimal SOB in order to complete self-care tasks with greater ease.        Start:  05/08/25    Expected End:  05/22/25               Mobility       Patient will ambulate household distance of 50' independently.        Start:  05/08/25    Expected End:  05/22/25               PT Transfers       Patient will transfer sit to and from stand independently.        Start:  05/08/25    Expected End:  05/22/25                   Education Documentation  Body Mechanics, taught by Paige Novak, PT at 5/8/2025  1:12 PM.  Learner: Patient  Readiness: Acceptance  Method: Explanation, Demonstration  Response: Verbalizes Understanding, Demonstrated Understanding  Comment: Role of acute PT, safety with lines/mobility, incentive spirometry, importance of mobility during recovery    Mobility Training, taught by Paige Novak, PT at 5/8/2025  1:12 PM.  Learner: Patient  Readiness: Acceptance  Method: Explanation, Demonstration  Response: Verbalizes Understanding, Demonstrated Understanding  Comment: Role of acute PT, safety with lines/mobility, incentive spirometry, importance of mobility during recovery    Education Comments  No comments found.

## 2025-05-08 NOTE — PROGRESS NOTES
"[facility] Hospitalist Progress Note       [id number]: Please page me for patient care issues.  [phone]: Please page night hospitalist for any issues.     Airam Tyson  :  1955(69 y.o.)  MRN:  [id number]  PCP: Yumiko Childress, ABIMAEL-CNP  LOS: 1  day(s) since admission    Assessment & Plan  Acute respiratory failure with hypoxia    Pneumonia of both lungs due to infectious organism    Shortness of breath      Assessment and Plan:     Airam Tyson is a 69 y.o. female with a PMH of HTN, HLD, T2DM, GERD, COPD, DORA, ILD (on mycophenolate, off Nintedanib 2/2 recurrent ophthalmic herpes zoster ), CRF on 4L home O2, DVT ().   Lung biopsy from  was significant for hypersensitivity pneumonitis after which she was recommended to avoid birds.    Patient presented to [facility] with c/o worsening shortness of breath associated with hypoxia, chest tightness, palpitations, dizziness, lightheadedness, and productive cough with yellow sputum over the past 2 weeks.  Per patient, she went to her PCP and was noted to be hypoxic, saturating 70s to 80s while on 4 L via nasal cannula.  Subsequently she was rushed to the ED for further evaluation treatment.  In the ED, patient was placed on 8 L via nasal cannula.  CT chest was significant for \"interstitial pneumonia form of pulmonary fibrosis\" and she was started on Rocephin/Azithromycin with a dose of Lasix due to elevated BNP.  Patient denies other associated symptoms such as fever, nausea, vomiting, diarrhea, chest pain, abdominal pain.  Patient does not on any bird pets and has only 1 cat.  Lives with her daughter.      #ACRF w/ hypoxia on 4L home o2 requiring 8L  #ILD flare  #AECOPD  #Severe pulmonary hypertension  #Non-IDDM II  #Chronic B/L LE edema  #Chronic HFpEF  #DORA on CPAP  #Hx DVT currently not on OAC  -off azithromycin/ceftriaxone, low suspicion for PNA  -On steroid, bronchodilators, pulmonary toileting   -Wean down to home O2 as able  -SSI while " "inpatient  -Resume rest of home meds as indicated  -awaiting TTE  -CS notes reviewed and recs appreciated: pulm    Disposition: await test results, await consultant recommendations, and await clinical improvement    DVT Prophylaxis: Subq Lovenox    Code status: Full Code  Diet: Adult diet Consistent Carb, 2-3 grams sodium; CCD 75 gm/meal; 1500 mL fluid    Level of MDM:  High    discussed with nurse, discussed with TCC/SW, I personally examined the patient, and I personally reviewed chart, data, labs radiology reports    Family Communication  Number :   Name of Designated Family Representative:       Total time spent: 35 minutes, of total time providing counseling or in coordination of care. Total time on this day of visit includes record and documentation review before and after visit including documentation and time not explicitly included on EMR time stamp      Subjective:   Interval History:  HPI  The patient complains of dyspnea  The patient feels their symptoms areimproving  no events or new concerns    Scheduled Meds:Scheduled Medications[1]  Continuous Infusions:Continuous Medications[2]  PRN Meds:PRN Medications[3]    Review of Systems   All other systems reviewed and are negative.    Interval Pertinent History:  Social History     Tobacco Use    Smoking status: Never    Smokeless tobacco: Never   Substance Use Topics    Alcohol use: Never         Objective:   Patient Vitals for the past 24 hrs:   BP Temp Temp src Pulse Resp SpO2 Height Weight   05/08/25 0906 108/73 36.7 °C (98.1 °F) Temporal 96 18 -- -- --   05/08/25 0534 106/74 36.1 °C (97 °F) Temporal 84 20 97 % -- 71.2 kg (156 lb 15.5 oz)   05/08/25 0405 -- -- -- -- -- 100 % -- --   05/07/25 2343 -- -- -- -- -- 98 % -- --   05/07/25 2146 103/69 -- -- 103 -- -- -- --   05/07/25 2130 -- -- -- -- -- 97 % -- --   05/07/25 2121 -- -- -- -- -- -- 1.65 m (5' 4.96\") 71 kg (156 lb 8.4 oz)   05/07/25 2000 90/66 36.8 °C (98.2 °F) Temporal 105 (!) 50 98 % -- -- " "  25 1845 92/65 -- -- (!) 110 (!) 42 97 % -- --   25 1815 96/68 -- -- (!) 119 (!) 21 96 % -- --   25 1700 (!) 119/102 -- -- (!) 115 (!) 41 (!) 89 % -- --   25 1630 97/76 -- -- (!) 115 (!) 39 (!) 81 % -- --   25 1615 112/82 -- -- (!) 113 (!) 33 (!) 91 % -- --   25 1545 103/77 -- -- (!) 112 (!) 26 (!) 92 % -- --   25 1530 85/69 -- -- (!) 106 (!) 33 100 % -- --   25 1515 115/74 -- -- (!) 106 (!) 26 (!) 91 % -- --   25 1500 100/80 -- -- (!) 107 (!) 26 (!) 89 % -- --   25 1457 (!) 107/93 37 °C (98.6 °F) -- (!) 108 (!) 23 (!) 92 % 1.626 m (5' 4\") 74.8 kg (165 lb)       Average, Min, and Max for last 24 hours Vitals:  TEMPERATURE:  Temp  Av.7 °C (98 °F)  Min: 36.1 °C (97 °F)  Max: 37 °C (98.6 °F)    RESPIRATIONS RANGE: Resp  Av.6  Min: 18  Max: 50    PULSE RANGE: Pulse  Av.3  Min: 84  Max: 126    BLOOD PRESSURE RANGE:  Systolic (24hrs), Av , Min:85 , Max:119   ; Diastolic (24hrs), Av, Min:65, Max:102      PULSE OXIMETRY RANGE: SpO2  Av.3 %  Min: 81 %  Max: 100 %  Body mass index is 26.15 kg/m².    I/O last 3 completed shifts:  In: 350 (4.9 mL/kg) [I.V.:50 (0.7 mL/kg); IV Piggyback:300]  Out: 200 (2.8 mL/kg) [Urine:200 (0.1 mL/kg/hr)]  Weight: 71.2 kg   Weight change:      Physical Exam  Vitals and nursing note reviewed.   Constitutional:       Appearance: Normal appearance.   HENT:      Head: Normocephalic and atraumatic.      Right Ear: External ear normal.      Left Ear: External ear normal.      Nose: Nose normal.      Mouth/Throat:      Mouth: Mucous membranes are moist.   Eyes:      General: No scleral icterus.        Right eye: No discharge.         Left eye: No discharge.      Extraocular Movements: Extraocular movements intact.      Conjunctiva/sclera: Conjunctivae normal.      Pupils: Pupils are equal, round, and reactive to light.   Cardiovascular:      Rate and Rhythm: Normal rate and regular rhythm.   Pulmonary:      " Effort: Pulmonary effort is normal.      Breath sounds: Wheezing present.   Abdominal:      General: Abdomen is flat. Bowel sounds are normal.      Palpations: Abdomen is soft.   Musculoskeletal:         General: Normal range of motion.      Right lower leg: Edema (+3) present.      Left lower leg: Edema (+3) present.   Skin:     General: Skin is warm and dry.      Capillary Refill: Capillary refill takes less than 2 seconds.   Neurological:      General: No focal deficit present.   Psychiatric:         Mood and Affect: Mood normal.         Thought Content: Thought content normal.         Judgment: Judgment normal.         Lab Results   Component Value Date     05/08/2025    K 4.5 05/08/2025    CL 99 05/08/2025    CO2 31 05/08/2025    BUN 13 05/08/2025    CREATININE 0.63 05/08/2025    GLUCOSE 162 (H) 05/08/2025    CALCIUM 8.2 (L) 05/08/2025    PROT 5.5 (L) 05/08/2025    BILITOT 0.4 05/08/2025    ALKPHOS 46 05/08/2025    AST 12 05/08/2025    ALT 18 05/08/2025    GLOB 3.5 03/10/2023       Lab Results   Component Value Date    WBC 4.2 (L) 05/08/2025    HGB 13.5 05/08/2025    HCT 41.5 05/08/2025    MCV 92 05/08/2025     05/08/2025    LYMPHOPCT 9.5 05/07/2025    RBC 4.49 05/08/2025    MCH 30.1 05/08/2025    MCHC 32.5 05/08/2025    RDW 13.7 05/08/2025    CRP 1.01 (A) 01/15/2022       Lab Results   Component Value Date    TSH 1.30 02/22/2022     Lab Results   Component Value Date    LACTATE 1.6 05/07/2025    TROPONINI <0.02 01/09/2022    BNP 1,547 (H) 05/07/2025    DDIMER 1,381 (A) 01/12/2022       IMAGES:  Encounter Date: 05/07/25   ECG 12 lead   Result Value    Ventricular Rate 107    Atrial Rate 107    TX Interval 146    QRS Duration 96    QT Interval 328    QTC Calculation(Bazett) 437    P Axis 34    R Axis 128    T Axis -35    QRS Count 18    Q Onset 204    P Onset 131    P Offset 175    T Offset 368    QTC Fredericia 397    Narrative    Sinus tachycardia  Left atrial enlargement  Incomplete right bundle  branch block  Right ventricular hypertrophy  ST & T wave abnormality, consider inferior ischemia  ST & T wave abnormality, consider anterolateral ischemia  Abnormal ECG  When compared with ECG of 06-JAN-2022 11:55,  Incomplete right bundle branch block is now Present        No echocardiogram results found for the past 12 months  === 05/07/25 ===    XR CHEST 1 VIEW    - Impression -  1. Coarsening interstitial markings within bilateral lung fields in  particular left lung component which is likely chronic in relation to  interstitial lung disease.. However, superimposed component of  infiltrate in particular in the left lung and right lung apex not  excluded.    Signed by: Bimal Sim 5/7/2025 3:14 PM  Dictation workstation:   [id number]  === 05/07/25 ===    CT ANGIO CHEST FOR PULMONARY EMBOLISM    - Impression -  1. Worsening of usual interstitial pneumonia form of pulmonary  fibrosis as described above. Pulmonology follow-up recommended.    2. Diffuse intrathoracic lymphadenopathy, progressed, possibly  reactive on the basis of the aforementioned.    3. Evidence of severe pulmonary hypertension and right heart  dysfunction with dilated main pulmonary artery, tortuous distal  pulmonary arteries, and dilated right heart chambers with marked  displacement of the interventricular septum toward the left  ventricle. Degree of main pulmonary artery dilatation is progressed  from prior study as is the degree of right heart chamber dilatation.    4. No acute pulmonary embolism.    MACRO:  None.    Signed by: Neo Beltran 5/7/2025 5:47 PM  Dictation workstation:   [id number]  === 05/07/25 ===    XR CHEST 1 VIEW    - Impression -  1. Coarsening interstitial markings within bilateral lung fields in  particular left lung component which is likely chronic in relation to  interstitial lung disease.. However, superimposed component of  infiltrate in particular in the left lung and right lung apex  not  excluded.    Signed by: Bimal Sim 5/7/2025 3:14 PM  Dictation workstation:   [id number]      Additional results of the last 24 hours have been reviewed.    Dictated using "[employer] - The Simple, Fast and Elegant Tablet Sales App" Version 2.4  Proof read however unrecognized voice recognition errors may have occurred     Electronically signed by Denver Brunson DO on 05/08/25 at 9:15 AM           [1] aspirin, 81 mg, oral, Daily  atorvastatin, 40 mg, oral, Daily  azithromycin, 500 mg, oral, q24h DAVI  budesonide, 0.5 mg, nebulization, BID   And  ipratropium-albuteroL, 3 mL, nebulization, TID  cefTRIAXone, 1 g, intravenous, Daily  enoxaparin, 40 mg, subcutaneous, q24h  fluticasone, 1 spray, Each Nostril, Daily  furosemide, 20 mg, oral, Daily  insulin lispro, 0-5 Units, subcutaneous, TID AC  [Held by provider] mycophenolate, 1,000 mg, oral, BID  oxygen, , inhalation, Continuous - Inhalation  pantoprazole, 40 mg, oral, Daily before breakfast  predniSONE, 40 mg, oral, Nightly  [2]    [3] PRN medications: acetaminophen, dextromethorphan-guaifenesin, dextrose, dextrose, glucagon, glucagon, ipratropium-albuteroL, melatonin, sennosides-docusate sodium

## 2025-05-08 NOTE — PROGRESS NOTES
05/08/25 1411   Discharge Planning   Living Arrangements Children   Support Systems Children   Assistance Needed A&0x3, independent with ADLs, ambulates with rollator, drives, on 4L 02 baseline thru medical services, no cpap or bipap, not active with any HHC, PCP is Yumiko Childress.   Type of Residence Private residence   Number of Stairs to Enter Residence 13   Number of Stairs Within Residence 1   Do you have animals or pets at home? Yes   Type of Animals or Pets 1 cat, 1 dog   Who is requesting discharge planning? Provider   Expected Discharge Disposition Home H  (PT rec low, OT rec no needs. Discussed with patient who is agreeable to HHC. Choices provided and selected [facility] for SN, PT, OT. Provider to send internal referral for HHC. May need transport home.)   Does the patient need discharge transport arranged? Yes   Financial Resource Strain   How hard is it for you to pay for the very basics like food, housing, medical care, and heating? Hard   Housing Stability   In the last 12 months, was there a time when you were not able to pay the mortgage or rent on time? N   In the past 12 months, how many times have you moved where you were living? 0   At any time in the past 12 months, were you homeless or living in a shelter (including now)? N   Transportation Needs   In the past 12 months, has lack of transportation kept you from medical appointments or from getting medications? no   In the past 12 months, has lack of transportation kept you from meetings, work, or from getting things needed for daily living? No   Patient Choice   Provider Choice list and CMS website (https://medicare.gov/care-compare#search) for post-acute Quality and Resource Measure Data were provided and reviewed with: Patient   Patient / Family choosing to utilize agency / facility established prior to hospitalization No   Stroke Family Assessment   Stroke Family Assessment Needed No   Intensity of Service   Intensity of Service 0-30 min      5/8/25 @ 1415: Patient on 8L currently.

## 2025-05-08 NOTE — CARE PLAN
Problem: Pain  Goal: Takes deep breaths with improved pain control throughout the shift  Outcome: Progressing  Goal: Turns in bed with improved pain control throughout the shift  Outcome: Progressing  Goal: Walks with improved pain control throughout the shift  Outcome: Progressing  Goal: Performs ADL's with improved pain control throughout shift  Outcome: Progressing  Goal: Participates in PT with improved pain control throughout the shift  Outcome: Progressing  Goal: Free from opioid side effects throughout the shift  Outcome: Progressing  Goal: Free from acute confusion related to pain meds throughout the shift  Outcome: Progressing     Problem: Skin  Goal: Decreased wound size/increased tissue granulation at next dressing change  Flowsheets (Taken 5/8/2025 1207)  Decreased wound size/increased tissue granulation at next dressing change: Promote sleep for wound healing  Goal: Prevent/manage excess moisture  Flowsheets (Taken 5/8/2025 1207)  Prevent/manage excess moisture: Use wicking fabric (obtain order)

## 2025-05-08 NOTE — CARE PLAN
CALEB consulted re: pt is on home O2, unable to pay her electricity bill and shut off notice is for May 16th.  Pt did call [facility] who said she needed to come in to complete paperwork and they would assist.  Pt ended up in hospital.  Pt lives at home with her dtr who is on the autism spectrum.  Dtr does work and pt receives Social Security.  Pt spoke with dtr who will go to the bank today to see if they can pay part of the bill.  Encouraged pt to call the electric company to let them know she is having difficulty, which she has not done yet.  She then called, but she has to wait 40-60 mins for them to call her back.  Pt is actively working on trying to pay her bill.  I will check back with her tomorrow.

## 2025-05-08 NOTE — CARE PLAN
Problem: Pain  Goal: Takes deep breaths with improved pain control throughout the shift  Outcome: Progressing     Problem: Respiratory  Goal: No signs of respiratory distress (eg. Use of accessory muscles. Peds grunting)  Outcome: Progressing     Problem: Fall/Injury  Goal: Not fall by end of shift  Outcome: Progressing   The patient's goals for the shift include      The clinical goals for the shift include patient will deny shortness of breath this shift    New admission. Patient alert and oriented, but tachypnea and short of breath.

## 2025-05-08 NOTE — PROGRESS NOTES
Occupational Therapy    Evaluation    Patient Name: Airam Tyson  MRN: [id number]  Department: [address]  Room: [address] [place] [address]  Today's Date: 5/8/2025  Time Calculation  Start Time: 1236  Stop Time: 1252  Time Calculation (min): 16 min    Assessment  IP OT Assessment  OT Assessment: Pt presents at baseline with ADL performance and functional mobility. No further skilled OT needs identified, defer to PT for ambulation  Prognosis: Good  Barriers to Discharge Home: No anticipated barriers  Evaluation/Treatment Tolerance: Patient tolerated treatment well  Medical Staff Made Aware: Yes  End of Session Communication: Bedside nurse  End of Session Patient Position: Bed, 3 rail up, Alarm on  Plan:  No Skilled OT: No acute OT goals identified  OT Frequency: OT eval only  OT Discharge Recommendations: No further acute OT  OT Recommended Transfer Status: Stand by assist  OT - OK to Discharge: Yes (per OT POC)    Subjective   Current Problem:  1. Acute respiratory failure with hypoxia  Transthoracic Echo Complete    Transthoracic Echo Complete      2. Pneumonia of both lungs due to infectious organism, unspecified part of lung        3. Shortness of breath        4. Interstitial lung disease (Multi)  Transthoracic Echo Complete    Transthoracic Echo Complete      5. Panlobular emphysema (Multi)  Transthoracic Echo Complete    Transthoracic Echo Complete      6. Severe pulmonary hypertension (Multi)  Transthoracic Echo Complete    Transthoracic Echo Complete        General:  General  Reason for Referral: 68 yo female referred to OT for AHRF, impaired ADLs/mobility  Referred By: ANABELL Brunson DO  Past Medical History Relevant to Rehab: HTN, HLD, T2DM, GERD, COPD, DORA, ILD (on Nintedanib and MMF), chronic respiratory failure (on 4L baseline), recurrent ophthalmic herpes zoster/shingles, h/o DVT 2021  Co-Treatment: PT  Co-Treatment Reason: maximize pt safety and outcomes  Prior to Session Communication: Bedside nurse  Patient Position  Received: Bed, 3 rail up, Alarm on  General Comment: Pt pleasant, cooperative with OT evaluation  Precautions:  Medical Precautions: Fall precautions, Oxygen therapy device and L/min (6L HF NC, tele)     Date/Time Vitals Session Patient Position Pulse Resp SpO2 BP MAP (mmHg)    05/08/25 1237 During PT  Sitting  106  --  93 %  --  --     05/08/25 1238 During PT  Ambulating  --  --  87 %  --  --     05/08/25 1239 Post PT  --  118  --  93 %  --  --                Pain:  Pain Assessment  Pain Assessment: 0-10  0-10 (Numeric) Pain Score: 0 - No pain    Objective   Cognition:  Overall Cognitive Status: Within Functional Limits  Arousal/Alertness: Appropriate responses to stimuli  Orientation Level: Oriented X4           Home Living:  Type of Home: House  Lives With: Adult children (dtr)  Home Adaptive Equipment: Cane (rollator)  Home Layout: Two level, Bed/bath upstairs, Stairs to alternate level with rails  Alternate Level Stairs-Rails: Right  Alternate Level Stairs-Number of Steps: 14  Home Access: Stairs to enter without rails  Entrance Stairs-Number of Steps: 1  Bathroom Shower/Tub: Tub/shower unit  Bathroom Equipment: Shower chair with back  Home Living Comments: Pt uses BSC on main level (in laundry room)   Prior Function:  Level of [place]: Independent with ADLs and functional transfers, Needs assistance with homemaking  Receives Help From: Family  ADL Assistance: Independent  Homemaking Assistance: Needs assistance (dtr completes)  Ambulatory Assistance: Independent (rollator use PRN)     ADL:  Eating Assistance: Independent  Grooming Assistance: Stand by  Bathing Assistance: Stand by  UE Dressing Assistance: Independent  LE Dressing Assistance: Independent  Toileting Assistance with Device: Stand by  Functional Assistance: Stand by  ADL Comments: SBA for toilet hygiene, hand washing in stance. Independent to don socks bed level. Other ADLs anticipated  Activity Tolerance:  Endurance: Tolerates less than 10  min exercise with changes in vital signs  Bed Mobility/Transfers: Bed Mobility  Bed Mobility: Yes  Bed Mobility 1  Bed Mobility 1: Supine to sitting, Sitting to supine  Level of Assistance 1: Close supervision  Bed Mobility Comments 1: HOB elevated; relies on momentum to achieve upright/to lift LE into bed    Transfers  Transfer: Yes  Transfer 1  Transfer From 1: Bed to  Transfer to 1: Stand  Technique 1: Sit to stand, Stand to sit  Transfer Level of Assistance 1: Close supervision  Transfers 2  Transfer From 2: Toilet to  Transfer to 2: Stand  Technique 2: Sit to stand, Stand to sit  Transfer Level of Assistance 2: Close supervision      Functional Mobility:  Functional Mobility  Functional Mobility Performed: Yes  Functional Mobility 1  Surface 1: Level tile  Device 1: No device  Assistance 1: Close supervision  Comments 1: Ambulated functional distance from bedside <> bathroom with good blance, limited endurance but WFL  Sitting Balance:  Static Sitting Balance  Static Sitting-Balance Support: Feet supported  Static Sitting-Level of Assistance: Distant supervision  Standing Balance:  Static Standing Balance  Static Standing-Balance Support: No upper extremity supported  Static Standing-Level of Assistance: Close supervision    Strength:  Strength Comments: BUE WFL     Coordination:  Movements are Fluid and Coordinated: Yes   Hand Function:  Hand Function  Gross Grasp: Functional  Coordination: Functional  Extremities: RUE   RUE : Within Functional Limits and LUE   LUE: Within Functional Limits    Outcome Measures: [facility] Daily Activity  Putting on and taking off regular lower body clothing: None  Bathing (including washing, rinsing, drying): A little  Putting on and taking off regular upper body clothing: None  Toileting, which includes using toilet, bedpan or urinal: A little  Taking care of personal grooming such as brushing teeth: None  Eating Meals: None  Daily Activity - Total Score: 22      Education  Documentation  Body Mechanics, taught by Arsen Otto OT at 5/8/2025  1:33 PM.  Learner: Patient  Readiness: Acceptance  Method: Explanation  Response: Verbalizes Understanding    Precautions, taught by Arsen Otto OT at 5/8/2025  1:33 PM.  Learner: Patient  Readiness: Acceptance  Method: Explanation  Response: Verbalizes Understanding    ADL Training, taught by Arsen Otto OT at 5/8/2025  1:33 PM.  Learner: Patient  Readiness: Acceptance  Method: Explanation  Response: Verbalizes Understanding    Education Comments  No comments found.

## 2025-05-09 ENCOUNTER — HOME HEALTH ADMISSION (OUTPATIENT)
Dept: HOME HEALTH SERVICES | Facility: HOME HEALTH | Age: 70
End: 2025-05-09
Payer: MEDICARE

## 2025-05-09 DIAGNOSIS — J44.1 CHRONIC OBSTRUCTIVE PULMONARY DISEASE WITH (ACUTE) EXACERBATION (MULTI): ICD-10-CM

## 2025-05-09 DIAGNOSIS — I10 HYPERTENSION, UNSPECIFIED TYPE: ICD-10-CM

## 2025-05-09 DIAGNOSIS — E11.9 TYPE 2 DIABETES MELLITUS WITHOUT COMPLICATION, WITHOUT LONG-TERM CURRENT USE OF INSULIN: ICD-10-CM

## 2025-05-09 PROBLEM — J96.21 ACUTE ON CHRONIC RESPIRATORY FAILURE WITH HYPOXIA: Status: ACTIVE | Noted: 2025-05-07

## 2025-05-09 LAB
ALBUMIN SERPL BCP-MCNC: 3.1 G/DL (ref 3.4–5)
ALP SERPL-CCNC: 41 U/L (ref 33–136)
ALT SERPL W P-5'-P-CCNC: 23 U/L (ref 7–45)
ANION GAP SERPL CALC-SCNC: 10 MMOL/L (ref 10–20)
AORTIC VALVE MEAN GRADIENT: 2 MMHG
AORTIC VALVE PEAK VELOCITY: 0.98 M/S
AST SERPL W P-5'-P-CCNC: 18 U/L (ref 9–39)
AV PEAK GRADIENT: 4 MMHG
AVA (PEAK VEL): 2.68 CM2
AVA (VTI): 2.64 CM2
BILIRUB SERPL-MCNC: 0.4 MG/DL (ref 0–1.2)
BUN SERPL-MCNC: 19 MG/DL (ref 6–23)
CALCIUM SERPL-MCNC: 8.2 MG/DL (ref 8.6–10.3)
CHLORIDE SERPL-SCNC: 99 MMOL/L (ref 98–107)
CO2 SERPL-SCNC: 33 MMOL/L (ref 21–32)
CREAT SERPL-MCNC: 0.67 MG/DL (ref 0.5–1.05)
EGFRCR SERPLBLD CKD-EPI 2021: >90 ML/MIN/1.73M*2
EJECTION FRACTION APICAL 4 CHAMBER: 60.2
EJECTION FRACTION: 58 %
ERYTHROCYTE [DISTWIDTH] IN BLOOD BY AUTOMATED COUNT: 13.8 % (ref 11.5–14.5)
GLUCOSE BLD MANUAL STRIP-MCNC: 109 MG/DL (ref 74–99)
GLUCOSE BLD MANUAL STRIP-MCNC: 157 MG/DL (ref 74–99)
GLUCOSE BLD MANUAL STRIP-MCNC: 189 MG/DL (ref 74–99)
GLUCOSE SERPL-MCNC: 149 MG/DL (ref 74–99)
HCT VFR BLD AUTO: 39.6 % (ref 36–46)
HGB BLD-MCNC: 12.4 G/DL (ref 12–16)
LEFT ATRIUM VOLUME AREA LENGTH INDEX BSA: 19.3 ML/M2
LEFT VENTRICLE INTERNAL DIMENSION DIASTOLE: 3.24 CM (ref 3.5–6)
LEFT VENTRICULAR OUTFLOW TRACT DIAMETER: 1.99 CM
MAGNESIUM SERPL-MCNC: 2.05 MG/DL (ref 1.6–2.4)
MCH RBC QN AUTO: 29.1 PG (ref 26–34)
MCHC RBC AUTO-ENTMCNC: 31.3 G/DL (ref 32–36)
MCV RBC AUTO: 93 FL (ref 80–100)
MITRAL VALVE E/A RATIO: 0.26
NRBC BLD-RTO: 0 /100 WBCS (ref 0–0)
PHOSPHATE SERPL-MCNC: 4.2 MG/DL (ref 2.5–4.9)
PLATELET # BLD AUTO: 233 X10*3/UL (ref 150–450)
POTASSIUM SERPL-SCNC: 4.8 MMOL/L (ref 3.5–5.3)
PROT SERPL-MCNC: 5.3 G/DL (ref 6.4–8.2)
RBC # BLD AUTO: 4.26 X10*6/UL (ref 4–5.2)
RIGHT VENTRICLE FREE WALL PEAK S': 6 CM/S
RIGHT VENTRICLE PEAK SYSTOLIC PRESSURE: 62 MMHG
SODIUM SERPL-SCNC: 137 MMOL/L (ref 136–145)
STAPHYLOCOCCUS SPEC CULT: NORMAL
TRICUSPID ANNULAR PLANE SYSTOLIC EXCURSION: 1.2 CM
WBC # BLD AUTO: 9.4 X10*3/UL (ref 4.4–11.3)

## 2025-05-09 PROCEDURE — 2500000004 HC RX 250 GENERAL PHARMACY W/ HCPCS (ALT 636 FOR OP/ED)

## 2025-05-09 PROCEDURE — 94640 AIRWAY INHALATION TREATMENT: CPT

## 2025-05-09 PROCEDURE — 2500000001 HC RX 250 WO HCPCS SELF ADMINISTERED DRUGS (ALT 637 FOR MEDICARE OP)

## 2025-05-09 PROCEDURE — 36415 COLL VENOUS BLD VENIPUNCTURE: CPT

## 2025-05-09 PROCEDURE — 94760 N-INVAS EAR/PLS OXIMETRY 1: CPT

## 2025-05-09 PROCEDURE — 84100 ASSAY OF PHOSPHORUS: CPT

## 2025-05-09 PROCEDURE — 1200000002 HC GENERAL ROOM WITH TELEMETRY DAILY

## 2025-05-09 PROCEDURE — 85027 COMPLETE CBC AUTOMATED: CPT

## 2025-05-09 PROCEDURE — 99232 SBSQ HOSP IP/OBS MODERATE 35: CPT | Performed by: INTERNAL MEDICINE

## 2025-05-09 PROCEDURE — 83735 ASSAY OF MAGNESIUM: CPT

## 2025-05-09 PROCEDURE — 2500000001 HC RX 250 WO HCPCS SELF ADMINISTERED DRUGS (ALT 637 FOR MEDICARE OP): Performed by: INTERNAL MEDICINE

## 2025-05-09 PROCEDURE — 2500000002 HC RX 250 W HCPCS SELF ADMINISTERED DRUGS (ALT 637 FOR MEDICARE OP, ALT 636 FOR OP/ED)

## 2025-05-09 PROCEDURE — 82947 ASSAY GLUCOSE BLOOD QUANT: CPT

## 2025-05-09 PROCEDURE — 80053 COMPREHEN METABOLIC PANEL: CPT

## 2025-05-09 PROCEDURE — 2500000004 HC RX 250 GENERAL PHARMACY W/ HCPCS (ALT 636 FOR OP/ED): Mod: JZ | Performed by: INTERNAL MEDICINE

## 2025-05-09 PROCEDURE — 2500000005 HC RX 250 GENERAL PHARMACY W/O HCPCS: Performed by: INTERNAL MEDICINE

## 2025-05-09 RX ORDER — FUROSEMIDE 20 MG/1
20 TABLET ORAL DAILY
Qty: 90 TABLET | Refills: 3 | Status: SHIPPED | OUTPATIENT
Start: 2025-05-09

## 2025-05-09 RX ORDER — METFORMIN HYDROCHLORIDE 500 MG/1
500 TABLET, EXTENDED RELEASE ORAL 2 TIMES DAILY
Qty: 180 TABLET | Refills: 3 | Status: SHIPPED | OUTPATIENT
Start: 2025-05-09

## 2025-05-09 RX ORDER — FUROSEMIDE 10 MG/ML
40 INJECTION INTRAMUSCULAR; INTRAVENOUS EVERY 24 HOURS
Status: DISCONTINUED | OUTPATIENT
Start: 2025-05-09 | End: 2025-05-10

## 2025-05-09 RX ADMIN — ASPIRIN 81 MG: 81 TABLET, CHEWABLE ORAL at 08:28

## 2025-05-09 RX ADMIN — PANTOPRAZOLE SODIUM 40 MG: 40 TABLET, DELAYED RELEASE ORAL at 06:23

## 2025-05-09 RX ADMIN — Medication 6 L/MIN: at 08:47

## 2025-05-09 RX ADMIN — BENZONATATE 200 MG: 100 CAPSULE ORAL at 08:28

## 2025-05-09 RX ADMIN — PREDNISONE 40 MG: 20 TABLET ORAL at 20:24

## 2025-05-09 RX ADMIN — ATORVASTATIN CALCIUM 40 MG: 40 TABLET, FILM COATED ORAL at 08:28

## 2025-05-09 RX ADMIN — Medication 4 L/MIN: at 15:05

## 2025-05-09 RX ADMIN — BENZONATATE 200 MG: 100 CAPSULE ORAL at 20:24

## 2025-05-09 RX ADMIN — ENOXAPARIN SODIUM 40 MG: 40 INJECTION SUBCUTANEOUS at 20:30

## 2025-05-09 RX ADMIN — FLUTICASONE PROPIONATE 1 SPRAY: 50 SPRAY, METERED NASAL at 08:31

## 2025-05-09 RX ADMIN — BENZONATATE 200 MG: 100 CAPSULE ORAL at 15:15

## 2025-05-09 RX ADMIN — GUAIFENESIN, DEXTROMETHORPHAN HBR 1 TABLET: 600; 30 TABLET ORAL at 20:24

## 2025-05-09 RX ADMIN — IPRATROPIUM BROMIDE AND ALBUTEROL SULFATE 3 ML: 2.5; .5 SOLUTION RESPIRATORY (INHALATION) at 08:47

## 2025-05-09 RX ADMIN — FUROSEMIDE 40 MG: 10 INJECTION, SOLUTION INTRAMUSCULAR; INTRAVENOUS at 15:14

## 2025-05-09 RX ADMIN — BUDESONIDE 0.5 MG: 0.5 INHALANT ORAL at 08:47

## 2025-05-09 RX ADMIN — FUROSEMIDE 20 MG: 40 TABLET ORAL at 08:28

## 2025-05-09 RX ADMIN — Medication 4 L/MIN: at 20:02

## 2025-05-09 RX ADMIN — IPRATROPIUM BROMIDE AND ALBUTEROL SULFATE 3 ML: 2.5; .5 SOLUTION RESPIRATORY (INHALATION) at 20:03

## 2025-05-09 RX ADMIN — BUDESONIDE 0.5 MG: 0.5 INHALANT ORAL at 20:02

## 2025-05-09 RX ADMIN — GUAIFENESIN, DEXTROMETHORPHAN HBR 1 TABLET: 600; 30 TABLET ORAL at 08:28

## 2025-05-09 RX ADMIN — Medication 6 L/MIN: at 03:49

## 2025-05-09 ASSESSMENT — ENCOUNTER SYMPTOMS
NEUROLOGICAL NEGATIVE: 1
CARDIOVASCULAR NEGATIVE: 1
CHILLS: 0
PSYCHIATRIC NEGATIVE: 1
COUGH: 1
SHORTNESS OF BREATH: 1
FEVER: 0
GASTROINTESTINAL NEGATIVE: 1

## 2025-05-09 ASSESSMENT — COGNITIVE AND FUNCTIONAL STATUS - GENERAL
CLIMB 3 TO 5 STEPS WITH RAILING: A LITTLE
DAILY ACTIVITIY SCORE: 23
MOBILITY SCORE: 23
TOILETING: A LITTLE

## 2025-05-09 ASSESSMENT — PAIN SCALES - GENERAL
PAINLEVEL_OUTOF10: 0 - NO PAIN
PAINLEVEL_OUTOF10: 0 - NO PAIN

## 2025-05-09 ASSESSMENT — PAIN - FUNCTIONAL ASSESSMENT
PAIN_FUNCTIONAL_ASSESSMENT: 0-10
PAIN_FUNCTIONAL_ASSESSMENT: 0-10

## 2025-05-09 NOTE — PROGRESS NOTES
"[facility] Hospitalist Progress Note       [phone]: Please page me for patient care issues.  [phone]: Please page night hospitalist for any issues.     Airam Tyson  :  1955(69 y.o.)  MRN:  [id number]  PCP: Yumiko Childress, RE  LOS: 2  day(s) since admission    Assessment & Plan  Acute on chronic respiratory failure with hypoxia    Shortness of breath    Hypersensitivity pneumonitis (Multi)      Assessment and Plan:     Airam Tyson is a 69 y.o. female with a PMH of HTN, HLD, T2DM, GERD, COPD, DORA, ILD (on mycophenolate, off Nintedanib 2/2 recurrent ophthalmic herpes zoster ), CRF on 4L home O2, DVT ().   Lung biopsy from  was significant for hypersensitivity pneumonitis after which she was recommended to avoid birds.    Patient presented to [facility] with c/o worsening shortness of breath associated with hypoxia, chest tightness, palpitations, dizziness, lightheadedness, and productive cough with yellow sputum over the past 2 weeks.  Per patient, she went to her PCP and was noted to be hypoxic, saturating 70s to 80s while on 4 L via nasal cannula.  Subsequently she was rushed to the ED for further evaluation treatment.  In the ED, patient was placed on 8 L via nasal cannula.  CT chest was significant for \"interstitial pneumonia form of pulmonary fibrosis\" and she was started on Rocephin/Azithromycin with a dose of Lasix due to elevated BNP.  Patient denies other associated symptoms such as fever, nausea, vomiting, diarrhea, chest pain, abdominal pain.  Patient does not on any bird pets and has only 1 cat.  Lives with her daughter.    #ACRF w/ hypoxia on 4L home  #ILD flare  #Hypersensitivity pneumonitis  #AECOPD  #Severe pulmonary hypertension  #Non-IDDM II  #Chronic B/L LE edema  #Chronic HFpEF  #DORA on CPAP  #Hx DVT currently not on OAC  -off azithromycin/ceftriaxone, low suspicion for PNA  -On steroid, bronchodilators, pulmonary toileting   -initiate IV lasix and hold home PO " lasix   -Wean down to home O2 as able  -SSI while inpatient  -Resume rest of home meds as indicated  -TTE: EF (55-60%), positive bubble study, elevated RSVP (60mmHg), severely enlarged R ventricle  -CS notes reviewed and recs appreciated: pulm    Disposition: await test results, await consultant recommendations, and await clinical improvement    DVT Prophylaxis: Subq Lovenox    Code status: Full Code  Diet: Adult diet Consistent Carb, 2-3 grams sodium; CCD 75 gm/meal; 1500 mL fluid    Level of MDM:  High    discussed with nurse, discussed with TCC/SW, I personally examined the patient, and I personally reviewed chart, data, labs radiology reports    Family Communication  Number :   Name of Designated Family Representative:       Total time spent: 35 minutes, of total time providing counseling or in coordination of care. Total time on this day of visit includes record and documentation review before and after visit including documentation and time not explicitly included on EMR time stamp      Subjective:   Interval History:  HPI  The patient complains of dyspnea  The patient feels their symptoms areimproving  no events or new concerns    Scheduled Meds:Scheduled Medications[1]  Continuous Infusions:Continuous Medications[2]  PRN Meds:PRN Medications[3]    Review of Systems   All other systems reviewed and are negative.    Interval Pertinent History:  Social History     Tobacco Use    Smoking status: Never    Smokeless tobacco: Never   Substance Use Topics    Alcohol use: Never         Objective:   Patient Vitals for the past 24 hrs:   BP Temp Temp src Pulse Resp SpO2 Weight   05/09/25 0847 -- -- -- -- -- 91 % --   05/09/25 0808 124/85 -- -- -- -- 100 % --   05/09/25 0807 -- 36.3 °C (97.3 °F) Temporal 87 16 -- --   05/09/25 0631 118/78 36 °C (96.8 °F) Temporal 82 20 96 % 70.6 kg (155 lb 10.3 oz)   05/09/25 0349 -- -- -- -- -- 100 % --   05/08/25 2344 -- -- -- -- -- 100 % --   05/08/25 2024 -- -- -- -- -- 100 % --    25 107/86 35.9 °C (96.6 °F) Temporal 93 22 100 % --   25 1239 -- -- -- (!) 118 -- 93 % --   25 1238 -- -- -- -- -- (!) 87 % --   25 1237 -- -- -- 106 -- 93 % --       Average, Min, and Max for last 24 hours Vitals:  TEMPERATURE:  Temp  Av.1 °C (96.9 °F)  Min: 35.9 °C (96.6 °F)  Max: 36.3 °C (97.3 °F)    RESPIRATIONS RANGE: Resp  Av.3  Min: 16  Max: 22    PULSE RANGE: Pulse  Av.2  Min: 82  Max: 118    BLOOD PRESSURE RANGE:  Systolic (24hrs), Av , Min:107 , Max:124   ; Diastolic (24hrs), Av, Min:78, Max:86      PULSE OXIMETRY RANGE: SpO2  Av %  Min: 87 %  Max: 100 %  Body mass index is 25.93 kg/m².    I/O last 3 completed shifts:  In: 1310 (18.6 mL/kg) [P.O.:960; I.V.:50 (0.7 mL/kg); IV Piggyback:300]  Out: 250 (3.5 mL/kg) [Urine:250 (0.1 mL/kg/hr)]  Weight: 70.6 kg   Weight change: -4.243 kg (-9 lb 5.7 oz)     Physical Exam  Vitals and nursing note reviewed.   Constitutional:       Appearance: Normal appearance.   HENT:      Head: Normocephalic and atraumatic.      Right Ear: External ear normal.      Left Ear: External ear normal.      Nose: Nose normal.      Mouth/Throat:      Mouth: Mucous membranes are moist.   Eyes:      General: No scleral icterus.        Right eye: No discharge.         Left eye: No discharge.      Extraocular Movements: Extraocular movements intact.      Conjunctiva/sclera: Conjunctivae normal.      Pupils: Pupils are equal, round, and reactive to light.   Cardiovascular:      Rate and Rhythm: Normal rate and regular rhythm.   Pulmonary:      Effort: Pulmonary effort is normal.      Breath sounds: Wheezing present.   Abdominal:      General: Abdomen is flat. Bowel sounds are normal.      Palpations: Abdomen is soft.   Musculoskeletal:         General: Normal range of motion.      Right lower leg: Edema (+3) present.      Left lower leg: Edema (+3) present.   Skin:     General: Skin is warm and dry.      Capillary Refill: Capillary  refill takes less than 2 seconds.   Neurological:      General: No focal deficit present.   Psychiatric:         Mood and Affect: Mood normal.         Thought Content: Thought content normal.         Judgment: Judgment normal.         Lab Results   Component Value Date     05/09/2025    K 4.8 05/09/2025    CL 99 05/09/2025    CO2 33 (H) 05/09/2025    BUN 19 05/09/2025    CREATININE 0.67 05/09/2025    GLUCOSE 149 (H) 05/09/2025    CALCIUM 8.2 (L) 05/09/2025    PROT 5.3 (L) 05/09/2025    BILITOT 0.4 05/09/2025    ALKPHOS 41 05/09/2025    AST 18 05/09/2025    ALT 23 05/09/2025    GLOB 3.5 03/10/2023       Lab Results   Component Value Date    WBC 9.4 05/09/2025    HGB 12.4 05/09/2025    HCT 39.6 05/09/2025    MCV 93 05/09/2025     05/09/2025    LYMPHOPCT 9.5 05/07/2025    RBC 4.26 05/09/2025    MCH 29.1 05/09/2025    MCHC 31.3 (L) 05/09/2025    RDW 13.8 05/09/2025    CRP 1.01 (A) 01/15/2022       Lab Results   Component Value Date    TSH 1.30 02/22/2022     Lab Results   Component Value Date    LACTATE 1.6 05/07/2025    TROPONINI <0.02 01/09/2022    BNP 1,547 (H) 05/07/2025    DDIMER 1,381 (A) 01/12/2022       IMAGES:  Encounter Date: 05/07/25   ECG 12 lead   Result Value    Ventricular Rate 107    Atrial Rate 107    MI Interval 146    QRS Duration 96    QT Interval 328    QTC Calculation(Bazett) 437    P Axis 34    R Axis 128    T Axis -35    QRS Count 18    Q Onset 204    P Onset 131    P Offset 175    T Offset 368    QTC Fredericia 397    Narrative    Sinus tachycardia  Left atrial enlargement  Incomplete right bundle branch block  Right ventricular hypertrophy  ST & T wave abnormality, consider inferior ischemia  ST & T wave abnormality, consider anterolateral ischemia  Abnormal ECG  When compared with ECG of 06-JAN-2022 11:55,  Incomplete right bundle branch block is now Present        Transthoracic Echo Complete  Result Date: 5/9/2025   [facility], [address], [place]  [address]               Tel [phone] and Fax [phone] TRANSTHORACIC ECHOCARDIOGRAM REPORT  Patient Name:       ORVILLE JOSHI SAKINA Reading Physician:    [id number] Manny Cordero MD Study Date:         5/8/2025            Ordering Provider:    [id number] REBABernardoSIMONA SONG MRN/PID:            [id number]            Fellow: Accession#:         [id number]        Nurse:                Angelia Cazares RN Date of Birth/Age:  1955 / 69      Sonographer:          Camille ott                                     RDKHANH Gender assigned at  F                   Additional Staff: Birth: Height:             162.56 cm           Admit Date:           5/8/2025 Weight:             70.76 kg            Admission Status:     Inpatient -                                                               Routine BSA / BMI:          1.76 m2 / 26.78     Encounter#:           [id number]                     kg/m2 Blood Pressure:     108/73 mmHg         Department Location:  [facility] Non                                                               Invasive Study Type:    TRANSTHORACIC ECHO (TTE) COMPLETE Diagnosis/ICD: Acute respiratory failure with hypoxia-J96.01; Pulmonary                hypertension, unspecified-I27.20 Indication:    acute respiratory failure, PHTN CPT Code:      Echo Complete w Full Doppler-[id number] Patient History: Diabetes:          Yes Pertinent History: HTN, Hyperlipidemia, GERD, DORA, respiratory failure, Dyspnea,                    Palpitations, CHF, COPD and Previous DVT. Study Detail: The following Echo studies were performed: 2D, M-Mode, Doppler and               color flow. Technically challenging study due to difficulty               breathing. Agitated saline used as a contrast agent for               intraseptal flow evaluation. The patient was awake.  PHYSICIAN  INTERPRETATION: Left Ventricle: Left ventricular ejection fraction is normal, by visual estimate at 55-60%. There is concentric left ventricular hypertrophy. There are no regional left ventricular wall motion abnormalities. The left ventricular cavity size is decreased. There is moderately increased septal and mildly increased posterior left ventricular wall thickness. The interventricular septum is flattened in diastole ('D' shaped left ventricle), consistent with right ventricular volume overload. Left ventricular diastolic filling cannot be determined due to E/A wave fusion. Left Atrium: The left atrial size is normal. A bubble study using agitated saline was performed. Bubble study is positive. There is a delayed appearance of saline contrast bubbles noted in the left atrium, which is indicative of intrapulmonary shunting. Right Ventricle: The right ventricle is severely enlarged. There is moderately reduced right ventricular systolic function. Right Atrium: The right atrium is severely dilated. Aortic Valve: The aortic valve is trileaflet. There is minimal aortic valve cusp calcification. The aortic valve dimensionless index is 0.85. There is no evidence of aortic valve regurgitation. The peak instantaneous gradient of the aortic valve is 4 mmHg. The mean gradient of the aortic valve is 2 mmHg. Mitral Valve: The mitral valve is mildly thickened. There is trace mitral valve regurgitation. Tricuspid Valve: The tricuspid valve is structurally normal. There is mild to moderate tricuspid regurgitation. The Doppler estimated RVSP is moderately elevated at 62.0 mmHg. Pulmonic Valve: The pulmonic valve is structurally normal. There is physiologic pulmonic valve regurgitation. Pericardium: Small pericardial effusion. Aorta: The aortic root is normal. There is upper limits of normal dilatation of the ascending aorta. There is no dilatation of the aortic root. Systemic Veins: The inferior vena cava appears dilated, with  IVC inspiratory collapse less than 50%. In comparison to the previous echocardiogram(s): There are no prior studies on this patient for comparison purposes.  CONCLUSIONS:  1. Left ventricular ejection fraction is normal, by visual estimate at 55-60%.  2. Left ventricular cavity size is decreased.  3. Right ventricular volume overload.  4. There is moderately increased septal thickness.  5. There is moderately reduced right ventricular systolic function.  6. Severely enlarged right ventricle.  7. The right atrium is severely dilated.  8. Mild to moderate tricuspid regurgitation visualized.  9. Moderately elevated right ventricular systolic pressure. 10. A bubble study shows that an intrapulmonary shunting is present. 11. A bubble study using agitated saline was performed. Bubble study is positive. QUANTITATIVE DATA SUMMARY:  2D MEASUREMENTS:          Normal Ranges: IVSd:            1.29 cm  (0.6-1.1cm) LVPWd:           1.07 cm  (0.6-1.1cm) LVIDd:           3.24 cm  (3.9-5.9cm) LVIDs:           2.85 cm LV Mass Index:   118 g/m2 LVEDV Index:     25 ml/m2 LV % FS          12.0 %  LEFT ATRIUM:                  Normal Ranges: LA Vol A4C:        40.8 ml    (22+/-6mL/m2) LA Vol A2C:        22.7 ml LA Vol BP:         33.9 ml LA Vol Index A4C:  23.2ml/m2 LA Vol Index A2C:  12.9 ml/m2 LA Vol Index BP:   19.3 ml/m2 LA Area A4C:       15.8 cm2 LA Area A2C:       10.6 cm2 LA Major Axis A4C: 5.2 cm LA Major Axis A2C: 4.2 cm LA Volume Index:   19.3 ml/m2 LA Vol A4C:        38.7 ml LA Vol A2C:        21.8 ml LA Vol Index BSA:  17.2 ml/m2  RIGHT ATRIUM:          Normal Ranges: RA Area A4C:  28.0 cm2  AORTA MEASUREMENTS:         Normal Ranges: Ao Sinus, d:        2.80 cm (2.1-3.5cm) Asc Ao, d:          3.50 cm (2.1-3.4cm)  LV SYSTOLIC FUNCTION:                      Normal Ranges: EF-A4C View:    60 % (>=55%) EF-A2C View:    49 % EF-Biplane:     57 % EF-Visual:      58 % LV EF Reported: 58 %  LV DIASTOLIC FUNCTION:             Normal  Ranges: MV Peak E:             0.23 m/s    (0.7-1.2 m/s) MV Peak A:             0.87 m/s    (0.42-0.7 m/s) E/A Ratio:             0.26        (1.0-2.2) MV e'                  0.060 m/s   (>8.0) MV lateral e'          0.08 m/s MV medial e'           0.04 m/s MV A Dur:              119.95 msec E/e' Ratio:            3.78        (<8.0)  AORTIC VALVE:                     Normal Ranges: AoV Vmax:                0.98 m/s (<=1.7m/s) AoV Peak PG:             3.8 mmHg (<20mmHg) AoV Mean P.1 mmHg (1.7-11.5mmHg) LVOT Max Adán:            0.84 m/s (<=1.1m/s) AoV VTI:                 15.26 cm (18-25cm) LVOT VTI:                13.00 cm LVOT Diameter:           1.99 cm  (1.8-2.4cm) AoV Area, VTI:           2.64 cm2 (2.5-5.5cm2) AoV Area,Vmax:           2.68 cm2 (2.5-4.5cm2) AoV Dimensionless Index: 0.85  RIGHT VENTRICLE: RV Basal 4.90 cm RV Mid   5.10 cm RV Major 7.3 cm TAPSE:   12.0 mm RV s'    0.06 m/s  TRICUSPID VALVE/RVSP:          Normal Ranges: Peak TR Velocity:     3.43 m/s RV Syst Pressure:     62 mmHg  (< 30mmHg) IVC Diam:             2.25 cm  AORTA: Asc Ao Diam 3.58 cm  [id number] Manny Cordero MD Electronically signed on 2025 at 8:54:42 AM  ** Final **     === 25 ===    XR CHEST 1 VIEW    - Impression -  1. Coarsening interstitial markings within bilateral lung fields in  particular left lung component which is likely chronic in relation to  interstitial lung disease.. However, superimposed component of  infiltrate in particular in the left lung and right lung apex not  excluded.    Signed by: Bimal Sim 2025 3:14 PM  Dictation workstation:   [id number]  === 25 ===    CT ANGIO CHEST FOR PULMONARY EMBOLISM    - Impression -  1. Worsening of usual interstitial pneumonia form of pulmonary  fibrosis as described above. Pulmonology follow-up recommended.    2. Diffuse intrathoracic lymphadenopathy, progressed, possibly  reactive on the basis of the aforementioned.    3. Evidence of  severe pulmonary hypertension and right heart  dysfunction with dilated main pulmonary artery, tortuous distal  pulmonary arteries, and dilated right heart chambers with marked  displacement of the interventricular septum toward the left  ventricle. Degree of main pulmonary artery dilatation is progressed  from prior study as is the degree of right heart chamber dilatation.    4. No acute pulmonary embolism.    MACRO:  None.    Signed by: Neo Beltran 5/7/2025 5:47 PM  Dictation workstation:   [id number]  === 05/07/25 ===    XR CHEST 1 VIEW    - Impression -  1. Coarsening interstitial markings within bilateral lung fields in  particular left lung component which is likely chronic in relation to  interstitial lung disease.. However, superimposed component of  infiltrate in particular in the left lung and right lung apex not  excluded.    Signed by: Bimal Sim 5/7/2025 3:14 PM  Dictation workstation:   [id number]      Additional results of the last 24 hours have been reviewed.    Dictated using [employer] Version 2.4  Proof read however unrecognized voice recognition errors may have occurred     Electronically signed by Denver Brunson DO on 05/09/25 at 10:52 AM           [1] aspirin, 81 mg, oral, Daily  atorvastatin, 40 mg, oral, Daily  benzonatate, 200 mg, oral, TID  budesonide, 0.5 mg, nebulization, BID   And  ipratropium-albuteroL, 3 mL, nebulization, TID  dextromethorphan-guaifenesin, 1 tablet, oral, BID  enoxaparin, 40 mg, subcutaneous, q24h  fluticasone, 1 spray, Each Nostril, Daily  furosemide, 20 mg, oral, Daily  insulin lispro, 0-10 Units, subcutaneous, TID AC  [Held by provider] mycophenolate, 1,000 mg, oral, BID  pantoprazole, 40 mg, oral, Daily before breakfast  predniSONE, 40 mg, oral, Nightly     [2]    [3] PRN medications: acetaminophen, dextrose, dextrose, glucagon, glucagon, hydrocodone-homatropine, ipratropium-albuteroL, melatonin, oxygen, sennosides-docusate  sodium

## 2025-05-09 NOTE — ASSESSMENT & PLAN NOTE
On 4L which is baseline.  Cont incentive spirometry and acapella.  Doubt pna given low procal.  Dc abx.

## 2025-05-09 NOTE — CARE PLAN
Pt talked with the electric company yesterday and she plans on getting at least the minimum due to them by next Friday.

## 2025-05-09 NOTE — CONSULTS
Inpatient consult to Pulmonology  Consult performed by: Bowen Royal MD  Consult ordered by: Ariel Chappell MD  Reason for consult: HP exacerbation  Assessment/Recommendations: 68 yo woman w/ h/o chronic HP admitted w/ acute on chronic hypoxic resp failure 2/2 HP    Assessment & Plan  Acute on chronic respiratory failure with hypoxia  On 4L which is baseline.  Cont incentive spirometry and acapella.  Doubt pna given low procal.  Dc abx.    Hypersensitivity pneumonitis (Multi)  Cont prednisone 40mg daily.  Resume ofev on discharge.  Advised pt to discuss w/ her pulmonlogist resuming cellcept.          Reason For Consult  HP exacerbation     History Of Present Illness  Airam Tyson is a 69 y.o. female presenting with acute on chronic hypoxic resp failure.  Pt w/ h/o chronic HP on Cellcept and ofev.  Pt had been off cellcept for the past month due to possible shingles.  Has had worsening dyspnea and cough.  No fevers or chills.  ET is about 25 feet.  CT chest w/ chronic fibrotic changes w/ some areas of GGOs.    Pt is anever smoker.  Worked as a .  Had cat.  Son had birds.  No family history of lung disease     Past Medical History  Medical History[1]    Surgical History  Surgical History[2]     Social History  Social History[3]    Family History  Family History[4]     Allergies  Egg, House dust, Spinach, [place], Fluticasone, Levofloxacin, Salmeterol, and Sulfa (sulfonamide antibiotics)    Review of Systems   Constitutional:  Negative for chills and fever.   Respiratory:  Positive for cough and shortness of breath.    Cardiovascular: Negative.    Gastrointestinal: Negative.    Neurological: Negative.    Psychiatric/Behavioral: Negative.     All other systems reviewed and are negative.       Physical Exam  Vitals reviewed.   Constitutional:       Appearance: Normal appearance. She is ill-appearing.   HENT:      Head: Normocephalic and atraumatic.   Eyes:      Extraocular Movements: Extraocular  "movements intact.   Cardiovascular:      Rate and Rhythm: Normal rate and regular rhythm.      Heart sounds: Normal heart sounds.   Pulmonary:      Effort: Pulmonary effort is normal.      Breath sounds: Rales present.   Abdominal:      Palpations: Abdomen is soft.      Tenderness: There is no abdominal tenderness.   Musculoskeletal:      Cervical back: Normal range of motion.   Skin:     General: Skin is warm.   Neurological:      General: No focal deficit present.      Mental Status: She is alert and oriented to person, place, and time. Mental status is at baseline.   Psychiatric:         Mood and Affect: Mood normal.         Behavior: Behavior normal.          Vital Signs  Blood pressure 107/86, pulse 93, temperature 35.9 °C (96.6 °F), temperature source Temporal, resp. rate 22, height 1.65 m (5' 4.96\"), weight 71.2 kg (156 lb 15.5 oz), SpO2 100%.  Oxygen Therapy  SpO2: 100 %  Medical Gas Therapy: Supplemental oxygen  Medical Gas Delivery Method: High flow nasal cannula       Relevant Results  CT angio chest for pulmonary embolism 05/07/2025    Narrative  Interpreted By:  Neo Beltran,  STUDY:  CT ANGIO CHEST FOR PULMONARY EMBOLISM;  5/7/2025 5:13 pm    INDICATION:  Signs/Symptoms:very sob.      COMPARISON:  11/02/2022    ACCESSION NUMBER(S):  [id number]    ORDERING CLINICIAN:  LAEK GARCIA    TECHNIQUE:  Contiguous axial images of the chest were obtained after the  intravenous administration of iodinated contrast using angiographic  PE protocol. Coronal and sagittal reformatted images were  reconstructed from the axial data. MIP images were created on an  independent workstation and reviewed.    FINDINGS:    MEDIASTINUM AND LYMPH NODES:  The esophageal wall appears within  normal limits.  There is diffuse intrathoracic lymphadenopathy,  primarily bilateral hilar, slightly progressed. No enlarged axillary  lymph nodes. No pneumomediastinum.    VESSELS:  Normal caliber thoracic aorta without " dissection. Minimal  aortic atherosclerosis. The pulmonary artery is enlarged, measuring  up to 3.6 cm, indicative of pulmonary hypertension. Tortuous distal  pulmonary arteries are also in keeping with pulmonary hypertension.  No acute pulmonary embolism.    HEART: Moderate cardiomegaly disproportionate right atrial and  ventricular dilatation. There is straightening and leftward bowing of  the interventricular septum. Mild coronary artery calcifications. No  pericardial effusion.    LUNG, AIRWAYS, PLEURA: There is worsening of pulmonary fibrosis.  There is traction bronchiectasis, bronchiolectasis, honeycombing, and  reticulation with minimal ground-glass component. There is a  heterogeneous distribution with areas of normal lung, reticulation,  and honeycombing. The worst amount of fibrosis is seen within the  left upper lobe and left lower lobe. No focal consolidation or  pleural effusion. No pneumothorax.    OSSEOUS STRUCTURES: No acute osseous abnormality.    CHEST WALL SOFT TISSUES: Body wall edema.    UPPER ABDOMEN/OTHER: Simple hepatic cyst. Nonobstructing  subcentimeter right renal calculus. Status post cholecystectomy.    Impression  1. Worsening of usual interstitial pneumonia form of pulmonary  fibrosis as described above. Pulmonology follow-up recommended.    2. Diffuse intrathoracic lymphadenopathy, progressed, possibly  reactive on the basis of the aforementioned.    3. Evidence of severe pulmonary hypertension and right heart  dysfunction with dilated main pulmonary artery, tortuous distal  pulmonary arteries, and dilated right heart chambers with marked  displacement of the interventricular septum toward the left  ventricle. Degree of main pulmonary artery dilatation is progressed  from prior study as is the degree of right heart chamber dilatation.    4. No acute pulmonary embolism.    MACRO:  None.    Signed by: Neo Beltran 5/7/2025 5:47 PM  Dictation workstation:   [id number]    Scheduled  medications  Scheduled Medications[5]  Continuous medications  Continuous Medications[6]  PRN medications  PRN Medications[7]    Results for orders placed or performed during the hospital encounter of 05/07/25 (from the past 24 hours)   CBC   Result Value Ref Range    WBC 4.2 (L) 4.4 - 11.3 x10*3/uL    nRBC 0.0 0.0 - 0.0 /100 WBCs    RBC 4.49 4.00 - 5.20 x10*6/uL    Hemoglobin 13.5 12.0 - 16.0 g/dL    Hematocrit 41.5 36.0 - 46.0 %    MCV 92 80 - 100 fL    MCH 30.1 26.0 - 34.0 pg    MCHC 32.5 32.0 - 36.0 g/dL    RDW 13.7 11.5 - 14.5 %    Platelets 230 150 - 450 x10*3/uL   Comprehensive Metabolic Panel   Result Value Ref Range    Glucose 162 (H) 74 - 99 mg/dL    Sodium 138 136 - 145 mmol/L    Potassium 4.5 3.5 - 5.3 mmol/L    Chloride 99 98 - 107 mmol/L    Bicarbonate 31 21 - 32 mmol/L    Anion Gap 13 10 - 20 mmol/L    Urea Nitrogen 13 6 - 23 mg/dL    Creatinine 0.63 0.50 - 1.05 mg/dL    eGFR >90 >60 mL/min/1.73m*2    Calcium 8.2 (L) 8.6 - 10.3 mg/dL    Albumin 3.1 (L) 3.4 - 5.0 g/dL    Alkaline Phosphatase 46 33 - 136 U/L    Total Protein 5.5 (L) 6.4 - 8.2 g/dL    AST 12 9 - 39 U/L    Bilirubin, Total 0.4 0.0 - 1.2 mg/dL    ALT 18 7 - 45 U/L   Magnesium   Result Value Ref Range    Magnesium 2.15 1.60 - 2.40 mg/dL   Phosphorus   Result Value Ref Range    Phosphorus 4.2 2.5 - 4.9 mg/dL   POCT GLUCOSE   Result Value Ref Range    POCT Glucose 170 (H) 74 - 99 mg/dL   Respiratory Culture/Smear    Specimen: SPUTUM; Fluid   Result Value Ref Range    Respiratory Culture/Smear (A)      Culture not performed. See Gram stain findings. Recollect if clinically indicated.    Gram Stain (A)      Gram stain indicates specimen contains significant salivary contamination.   POCT GLUCOSE   Result Value Ref Range    POCT Glucose 178 (H) 74 - 99 mg/dL   Transthoracic Echo Complete   Result Value Ref Range    BSA 1.81 m2   POCT GLUCOSE   Result Value Ref Range    POCT Glucose 175 (H) 74 - 99 mg/dL   POCT GLUCOSE   Result Value Ref Range     POCT Glucose 145 (H) 74 - 99 mg/dL         Assessment/Plan     68 yo woman w/ h/o chronic HP admitted w/ acute on chronic hypoxic resp failure 2/2 HP    Assessment & Plan  Acute on chronic respiratory failure with hypoxia  On 4L which is baseline.  Cont incentive spirometry and acapella.  Doubt pna given low procal.  Dc abx.  Hypersensitivity pneumonitis (Multi)  Cont prednisone 40mg daily.  Resume ofev on discharge.  Advised pt to discuss w/ her pulmonlogist resuming cellcept.      Bowen Royal MD         [1]   Past Medical History:  Diagnosis Date    COPD (chronic obstructive pulmonary disease) (Multi)     Diabetes mellitus (Multi)     GERD (gastroesophageal reflux disease)     Hypertension    [2] No past surgical history on file.  [3]   Social History  Tobacco Use    Smoking status: Never    Smokeless tobacco: Never   Vaping Use    Vaping status: Never Used   Substance Use Topics    Alcohol use: Never    Drug use: Never   [4]   Family History  Problem Relation Name Age of Onset    Bone cancer Mother      Other (brain bleed) Father     [5] aspirin, 81 mg, oral, Daily  atorvastatin, 40 mg, oral, Daily  benzonatate, 200 mg, oral, TID  budesonide, 0.5 mg, nebulization, BID   And  ipratropium-albuteroL, 3 mL, nebulization, TID  dextromethorphan-guaifenesin, 1 tablet, oral, BID  enoxaparin, 40 mg, subcutaneous, q24h  fluticasone, 1 spray, Each Nostril, Daily  furosemide, 20 mg, oral, Daily  insulin lispro, 0-10 Units, subcutaneous, TID AC  [Held by provider] mycophenolate, 1,000 mg, oral, BID  pantoprazole, 40 mg, oral, Daily before breakfast  predniSONE, 40 mg, oral, Nightly  [6]    [7] PRN medications: acetaminophen, dextrose, dextrose, glucagon, glucagon, hydrocodone-homatropine, ipratropium-albuteroL, melatonin, oxygen, sennosides-docusate sodium

## 2025-05-09 NOTE — ASSESSMENT & PLAN NOTE
Cont prednisone 40mg daily.  Resume ofev on discharge.  Advised pt to discuss w/ her pulmonlogist resuming cellcept.

## 2025-05-09 NOTE — CARE PLAN
Problem: Pain  Goal: Takes deep breaths with improved pain control throughout the shift  Outcome: Progressing  Goal: Turns in bed with improved pain control throughout the shift  Outcome: Progressing  Goal: Walks with improved pain control throughout the shift  Outcome: Progressing  Goal: Performs ADL's with improved pain control throughout shift  Outcome: Progressing  Goal: Participates in PT with improved pain control throughout the shift  Outcome: Progressing  Goal: Free from opioid side effects throughout the shift  Outcome: Progressing  Goal: Free from acute confusion related to pain meds throughout the shift  Outcome: Progressing     Problem: Respiratory  Goal: Clear secretions with interventions this shift  Outcome: Progressing  Goal: Minimize anxiety/maximize coping throughout shift  Outcome: Progressing  Goal: Minimal/no exertional discomfort or dyspnea this shift  Outcome: Progressing  Goal: No signs of respiratory distress (eg. Use of accessory muscles. Peds grunting)  Outcome: Progressing  Goal: Patent airway maintained this shift  Outcome: Progressing  Goal: Tolerate mechanical ventilation evidenced by VS/agitation level this shift  Outcome: Progressing  Goal: Tolerate pulmonary toileting this shift  Outcome: Progressing  Goal: Verbalize decreased shortness of breath this shift  Outcome: Progressing  Goal: Wean oxygen to maintain O2 saturation per order/standard this shift  Outcome: Progressing  Goal: Increase self care and/or family involvement in next 24 hours  Outcome: Progressing

## 2025-05-09 NOTE — PROGRESS NOTES
05/09/25 1350   Discharge Planning   Living Arrangements Children   Support Systems Children   Assistance Needed A&0x3, independent with ADLs, ambulates with rollator, drives, on 4L 02 baseline thru medical services, no cpap or bipap, not active with any HHC, PCP is Yumiko Childress.   Type of Residence Private residence   Number of Stairs to Enter Residence 13   Number of Stairs Within Residence 1   Who is requesting discharge planning? Provider   Expected Discharge Disposition Home H  (Home with daughter and new HC for SN, PT, OT. Provider to send internal referral for HHC. ON 7L 02 here, 4L baseline.)   Does the patient need discharge transport arranged? No   Patient Choice   Provider Choice list and CMS website (https://medicare.gov/care-compare#search) for post-acute Quality and Resource Measure Data were provided and reviewed with: Patient   Stroke Family Assessment   Stroke Family Assessment Needed No   Intensity of Service   Intensity of Service 0-30 min

## 2025-05-09 NOTE — CARE PLAN
The patient's goals for the shift include      The clinical goals for the shift include Pt will keep KINJAL hose on through end of shift.    Goals achieved

## 2025-05-10 LAB
ALBUMIN SERPL BCP-MCNC: 3.2 G/DL (ref 3.4–5)
ALP SERPL-CCNC: 41 U/L (ref 33–136)
ALT SERPL W P-5'-P-CCNC: 22 U/L (ref 7–45)
ANION GAP SERPL CALC-SCNC: 14 MMOL/L (ref 10–20)
AST SERPL W P-5'-P-CCNC: 13 U/L (ref 9–39)
BILIRUB SERPL-MCNC: 0.4 MG/DL (ref 0–1.2)
BUN SERPL-MCNC: 20 MG/DL (ref 6–23)
CALCIUM SERPL-MCNC: 8.3 MG/DL (ref 8.6–10.3)
CHLORIDE SERPL-SCNC: 99 MMOL/L (ref 98–107)
CO2 SERPL-SCNC: 29 MMOL/L (ref 21–32)
CREAT SERPL-MCNC: 0.59 MG/DL (ref 0.5–1.05)
EGFRCR SERPLBLD CKD-EPI 2021: >90 ML/MIN/1.73M*2
ERYTHROCYTE [DISTWIDTH] IN BLOOD BY AUTOMATED COUNT: 13.6 % (ref 11.5–14.5)
GLUCOSE BLD MANUAL STRIP-MCNC: 123 MG/DL (ref 74–99)
GLUCOSE BLD MANUAL STRIP-MCNC: 139 MG/DL (ref 74–99)
GLUCOSE BLD MANUAL STRIP-MCNC: 144 MG/DL (ref 74–99)
GLUCOSE BLD MANUAL STRIP-MCNC: 189 MG/DL (ref 74–99)
GLUCOSE SERPL-MCNC: 157 MG/DL (ref 74–99)
HCT VFR BLD AUTO: 41.6 % (ref 36–46)
HGB BLD-MCNC: 13.4 G/DL (ref 12–16)
MAGNESIUM SERPL-MCNC: 1.96 MG/DL (ref 1.6–2.4)
MCH RBC QN AUTO: 30 PG (ref 26–34)
MCHC RBC AUTO-ENTMCNC: 32.2 G/DL (ref 32–36)
MCV RBC AUTO: 93 FL (ref 80–100)
NRBC BLD-RTO: 0 /100 WBCS (ref 0–0)
PHOSPHATE SERPL-MCNC: 3.8 MG/DL (ref 2.5–4.9)
PLATELET # BLD AUTO: 236 X10*3/UL (ref 150–450)
POTASSIUM SERPL-SCNC: 4.4 MMOL/L (ref 3.5–5.3)
PROT SERPL-MCNC: 5.6 G/DL (ref 6.4–8.2)
RBC # BLD AUTO: 4.47 X10*6/UL (ref 4–5.2)
SODIUM SERPL-SCNC: 138 MMOL/L (ref 136–145)
WBC # BLD AUTO: 9.5 X10*3/UL (ref 4.4–11.3)

## 2025-05-10 PROCEDURE — 2500000002 HC RX 250 W HCPCS SELF ADMINISTERED DRUGS (ALT 637 FOR MEDICARE OP, ALT 636 FOR OP/ED)

## 2025-05-10 PROCEDURE — 99232 SBSQ HOSP IP/OBS MODERATE 35: CPT | Performed by: INTERNAL MEDICINE

## 2025-05-10 PROCEDURE — 82947 ASSAY GLUCOSE BLOOD QUANT: CPT

## 2025-05-10 PROCEDURE — 2500000005 HC RX 250 GENERAL PHARMACY W/O HCPCS: Performed by: INTERNAL MEDICINE

## 2025-05-10 PROCEDURE — 2500000004 HC RX 250 GENERAL PHARMACY W/ HCPCS (ALT 636 FOR OP/ED): Mod: JZ

## 2025-05-10 PROCEDURE — 1200000002 HC GENERAL ROOM WITH TELEMETRY DAILY

## 2025-05-10 PROCEDURE — 2500000001 HC RX 250 WO HCPCS SELF ADMINISTERED DRUGS (ALT 637 FOR MEDICARE OP): Performed by: INTERNAL MEDICINE

## 2025-05-10 PROCEDURE — 5A0935A ASSISTANCE WITH RESPIRATORY VENTILATION, LESS THAN 24 CONSECUTIVE HOURS, HIGH NASAL FLOW/VELOCITY: ICD-10-PCS | Performed by: INTERNAL MEDICINE

## 2025-05-10 PROCEDURE — 36415 COLL VENOUS BLD VENIPUNCTURE: CPT

## 2025-05-10 PROCEDURE — 2500000001 HC RX 250 WO HCPCS SELF ADMINISTERED DRUGS (ALT 637 FOR MEDICARE OP)

## 2025-05-10 PROCEDURE — 80053 COMPREHEN METABOLIC PANEL: CPT

## 2025-05-10 PROCEDURE — 2500000004 HC RX 250 GENERAL PHARMACY W/ HCPCS (ALT 636 FOR OP/ED): Mod: JZ | Performed by: INTERNAL MEDICINE

## 2025-05-10 PROCEDURE — 84100 ASSAY OF PHOSPHORUS: CPT

## 2025-05-10 PROCEDURE — 94640 AIRWAY INHALATION TREATMENT: CPT

## 2025-05-10 PROCEDURE — 85027 COMPLETE CBC AUTOMATED: CPT

## 2025-05-10 PROCEDURE — 94760 N-INVAS EAR/PLS OXIMETRY 1: CPT

## 2025-05-10 PROCEDURE — 83735 ASSAY OF MAGNESIUM: CPT

## 2025-05-10 RX ORDER — FUROSEMIDE 10 MG/ML
40 INJECTION INTRAMUSCULAR; INTRAVENOUS EVERY 12 HOURS
Status: DISCONTINUED | OUTPATIENT
Start: 2025-05-10 | End: 2025-05-12

## 2025-05-10 RX ADMIN — Medication 4 L/MIN: at 13:15

## 2025-05-10 RX ADMIN — BUDESONIDE 0.5 MG: 0.5 INHALANT ORAL at 07:35

## 2025-05-10 RX ADMIN — GUAIFENESIN, DEXTROMETHORPHAN HBR 1 TABLET: 600; 30 TABLET ORAL at 20:27

## 2025-05-10 RX ADMIN — GUAIFENESIN, DEXTROMETHORPHAN HBR 1 TABLET: 600; 30 TABLET ORAL at 08:31

## 2025-05-10 RX ADMIN — ATORVASTATIN CALCIUM 40 MG: 40 TABLET, FILM COATED ORAL at 08:31

## 2025-05-10 RX ADMIN — FUROSEMIDE 40 MG: 10 INJECTION, SOLUTION INTRAMUSCULAR; INTRAVENOUS at 20:28

## 2025-05-10 RX ADMIN — BENZONATATE 200 MG: 100 CAPSULE ORAL at 08:31

## 2025-05-10 RX ADMIN — BENZONATATE 200 MG: 100 CAPSULE ORAL at 16:35

## 2025-05-10 RX ADMIN — IPRATROPIUM BROMIDE AND ALBUTEROL SULFATE 3 ML: 2.5; .5 SOLUTION RESPIRATORY (INHALATION) at 19:05

## 2025-05-10 RX ADMIN — IPRATROPIUM BROMIDE AND ALBUTEROL SULFATE 3 ML: 2.5; .5 SOLUTION RESPIRATORY (INHALATION) at 07:34

## 2025-05-10 RX ADMIN — BENZONATATE 200 MG: 100 CAPSULE ORAL at 20:27

## 2025-05-10 RX ADMIN — Medication 4 L/MIN: at 19:04

## 2025-05-10 RX ADMIN — BUDESONIDE 0.5 MG: 0.5 INHALANT ORAL at 19:04

## 2025-05-10 RX ADMIN — FLUTICASONE PROPIONATE 1 SPRAY: 50 SPRAY, METERED NASAL at 09:48

## 2025-05-10 RX ADMIN — IPRATROPIUM BROMIDE AND ALBUTEROL SULFATE 3 ML: 2.5; .5 SOLUTION RESPIRATORY (INHALATION) at 13:13

## 2025-05-10 RX ADMIN — PANTOPRAZOLE SODIUM 40 MG: 40 TABLET, DELAYED RELEASE ORAL at 06:32

## 2025-05-10 RX ADMIN — ASPIRIN 81 MG: 81 TABLET, CHEWABLE ORAL at 08:31

## 2025-05-10 RX ADMIN — ENOXAPARIN SODIUM 40 MG: 40 INJECTION SUBCUTANEOUS at 20:30

## 2025-05-10 RX ADMIN — Medication 4 L/MIN: at 07:35

## 2025-05-10 RX ADMIN — PREDNISONE 40 MG: 20 TABLET ORAL at 20:27

## 2025-05-10 RX ADMIN — FUROSEMIDE 40 MG: 10 INJECTION, SOLUTION INTRAMUSCULAR; INTRAVENOUS at 09:48

## 2025-05-10 ASSESSMENT — PAIN SCALES - GENERAL
PAINLEVEL_OUTOF10: 0 - NO PAIN
PAINLEVEL_OUTOF10: 0 - NO PAIN

## 2025-05-10 ASSESSMENT — COGNITIVE AND FUNCTIONAL STATUS - GENERAL
MOBILITY SCORE: 23
TOILETING: A LITTLE
CLIMB 3 TO 5 STEPS WITH RAILING: A LITTLE
DAILY ACTIVITIY SCORE: 23

## 2025-05-10 ASSESSMENT — PAIN - FUNCTIONAL ASSESSMENT: PAIN_FUNCTIONAL_ASSESSMENT: 0-10

## 2025-05-10 NOTE — ASSESSMENT & PLAN NOTE
Cont prednisone 40mg daily.  Recommend 1 month taper on discharge.  Resume ofev on discharge.  Advised pt to discuss w/ her pulmonlogist resuming cellcept.

## 2025-05-10 NOTE — PROGRESS NOTES
"Airam Tyson is a 69 y.o. female on day 3 of admission presenting with Acute on chronic respiratory failure with hypoxia.    Subjective   Feels a little better today.       Objective     Physical Exam  Vitals reviewed.   Constitutional:       Appearance: She is ill-appearing.   HENT:      Head: Normocephalic and atraumatic.   Eyes:      Extraocular Movements: Extraocular movements intact.   Cardiovascular:      Rate and Rhythm: Normal rate and regular rhythm.      Heart sounds: Normal heart sounds.   Pulmonary:      Effort: Pulmonary effort is normal.      Comments: Coarse breath sounds bilaterally   Abdominal:      Palpations: Abdomen is soft.      Tenderness: There is no abdominal tenderness.   Musculoskeletal:      Cervical back: Normal range of motion.   Skin:     General: Skin is warm.   Neurological:      General: No focal deficit present.      Mental Status: She is alert and oriented to person, place, and time. Mental status is at baseline.   Psychiatric:         Mood and Affect: Mood normal.         Behavior: Behavior normal.         Last Recorded Vitals  Blood pressure 121/80, pulse 87, temperature 37.1 °C (98.8 °F), temperature source Temporal, resp. rate 18, height 1.65 m (5' 4.96\"), weight 71.5 kg (157 lb 9.6 oz), SpO2 98%.  Intake/Output last 3 Shifts:  I/O last 3 completed shifts:  In: 840 (11.8 mL/kg) [P.O.:840]  Out: 2050 (28.7 mL/kg) [Urine:2050 (0.8 mL/kg/hr)]  Weight: 71.5 kg     Relevant Results  Results for orders placed or performed during the hospital encounter of 05/07/25 (from the past 24 hours)   POCT GLUCOSE   Result Value Ref Range    POCT Glucose 109 (H) 74 - 99 mg/dL   CBC   Result Value Ref Range    WBC 9.5 4.4 - 11.3 x10*3/uL    nRBC 0.0 0.0 - 0.0 /100 WBCs    RBC 4.47 4.00 - 5.20 x10*6/uL    Hemoglobin 13.4 12.0 - 16.0 g/dL    Hematocrit 41.6 36.0 - 46.0 %    MCV 93 80 - 100 fL    MCH 30.0 26.0 - 34.0 pg    MCHC 32.2 32.0 - 36.0 g/dL    RDW 13.6 11.5 - 14.5 %    Platelets 236 " 150 - 450 x10*3/uL   Comprehensive Metabolic Panel   Result Value Ref Range    Glucose 157 (H) 74 - 99 mg/dL    Sodium 138 136 - 145 mmol/L    Potassium 4.4 3.5 - 5.3 mmol/L    Chloride 99 98 - 107 mmol/L    Bicarbonate 29 21 - 32 mmol/L    Anion Gap 14 10 - 20 mmol/L    Urea Nitrogen 20 6 - 23 mg/dL    Creatinine 0.59 0.50 - 1.05 mg/dL    eGFR >90 >60 mL/min/1.73m*2    Calcium 8.3 (L) 8.6 - 10.3 mg/dL    Albumin 3.2 (L) 3.4 - 5.0 g/dL    Alkaline Phosphatase 41 33 - 136 U/L    Total Protein 5.6 (L) 6.4 - 8.2 g/dL    AST 13 9 - 39 U/L    Bilirubin, Total 0.4 0.0 - 1.2 mg/dL    ALT 22 7 - 45 U/L   Magnesium   Result Value Ref Range    Magnesium 1.96 1.60 - 2.40 mg/dL   Phosphorus   Result Value Ref Range    Phosphorus 3.8 2.5 - 4.9 mg/dL   POCT GLUCOSE   Result Value Ref Range    POCT Glucose 144 (H) 74 - 99 mg/dL   POCT GLUCOSE   Result Value Ref Range    POCT Glucose 189 (H) 74 - 99 mg/dL        Scheduled medications  Scheduled Medications[1]  Continuous medications  Continuous Medications[2]  PRN medications  PRN Medications[3]       Assessment/Plan   Assessment & Plan  Acute on chronic respiratory failure with hypoxia  On 4L, 4-6L is baseline.  Cont incentive spirometry and acapella.   Hypersensitivity pneumonitis (Multi)  Cont prednisone 40mg daily.  Recommend 1 month taper on discharge.  Resume ofev on discharge.  Advised pt to discuss w/ her pulmonlogist resuming cellcept.    Pt should follow-up with her pulmonologist within 1 month.    Will sign off at this time.  Please reconsult if there are any further questions.          Bowen Royal MD       [1] aspirin, 81 mg, oral, Daily  atorvastatin, 40 mg, oral, Daily  benzonatate, 200 mg, oral, TID  budesonide, 0.5 mg, nebulization, BID   And  ipratropium-albuteroL, 3 mL, nebulization, TID  dextromethorphan-guaifenesin, 1 tablet, oral, BID  enoxaparin, 40 mg, subcutaneous, q24h  fluticasone, 1 spray, Each Nostril, Daily  furosemide, 40 mg, intravenous,  q12h  [Held by provider] furosemide, 20 mg, oral, Daily  insulin lispro, 0-10 Units, subcutaneous, TID AC  [Held by provider] mycophenolate, 1,000 mg, oral, BID  pantoprazole, 40 mg, oral, Daily before breakfast  predniSONE, 40 mg, oral, Nightly     [2]    [3] PRN medications: acetaminophen, dextrose, dextrose, glucagon, glucagon, hydrocodone-homatropine, ipratropium-albuteroL, melatonin, oxygen, sennosides-docusate sodium

## 2025-05-10 NOTE — PROGRESS NOTES
Physical Therapy                 Therapy Communication Note    Patient Name: Airam Tyson  MRN: [id number]  Department: [address]  Room: [address] [place] [address]  Today's Date: 5/10/2025     Discipline: Physical Therapy    PT Missed Visit: Yes     Missed Visit Reason: Missed Visit Reason: Patient refused (Pt states she had just gotten comfortable in bed. She has been ambulating with staff within room. Pt deferring PT treatment session at this time.)    Missed Time: Attempt 1333

## 2025-05-10 NOTE — PROGRESS NOTES
"Airam Tyson is a 69 y.o. female on day 2 of admission presenting with Acute on chronic respiratory failure with hypoxia.    Subjective   Feels about the same today.       Objective     Physical Exam  Vitals reviewed.   Constitutional:       Appearance: She is ill-appearing.   HENT:      Head: Normocephalic and atraumatic.   Eyes:      Extraocular Movements: Extraocular movements intact.   Cardiovascular:      Rate and Rhythm: Normal rate and regular rhythm.      Heart sounds: Normal heart sounds.   Pulmonary:      Effort: Pulmonary effort is normal.      Comments: Coarse breath sounds bilaterally   Abdominal:      Palpations: Abdomen is soft.      Tenderness: There is no abdominal tenderness.   Musculoskeletal:      Cervical back: Normal range of motion.   Skin:     General: Skin is warm.   Neurological:      General: No focal deficit present.      Mental Status: She is alert and oriented to person, place, and time. Mental status is at baseline.   Psychiatric:         Mood and Affect: Mood normal.         Behavior: Behavior normal.         Last Recorded Vitals  Blood pressure 114/78, pulse 90, temperature 36 °C (96.8 °F), temperature source Temporal, resp. rate 18, height 1.65 m (5' 4.96\"), weight 70.6 kg (155 lb 10.3 oz), SpO2 95%.  Intake/Output last 3 Shifts:  I/O last 3 completed shifts:  In: 1610 (22.8 mL/kg) [P.O.:1560; IV Piggyback:50]  Out: 1650 (23.4 mL/kg) [Urine:1650 (0.6 mL/kg/hr)]  Weight: 70.6 kg     Relevant Results  Results for orders placed or performed during the hospital encounter of 05/07/25 (from the past 24 hours)   CBC   Result Value Ref Range    WBC 9.4 4.4 - 11.3 x10*3/uL    nRBC 0.0 0.0 - 0.0 /100 WBCs    RBC 4.26 4.00 - 5.20 x10*6/uL    Hemoglobin 12.4 12.0 - 16.0 g/dL    Hematocrit 39.6 36.0 - 46.0 %    MCV 93 80 - 100 fL    MCH 29.1 26.0 - 34.0 pg    MCHC 31.3 (L) 32.0 - 36.0 g/dL    RDW 13.8 11.5 - 14.5 %    Platelets 233 150 - 450 x10*3/uL   Comprehensive Metabolic Panel "   Result Value Ref Range    Glucose 149 (H) 74 - 99 mg/dL    Sodium 137 136 - 145 mmol/L    Potassium 4.8 3.5 - 5.3 mmol/L    Chloride 99 98 - 107 mmol/L    Bicarbonate 33 (H) 21 - 32 mmol/L    Anion Gap 10 10 - 20 mmol/L    Urea Nitrogen 19 6 - 23 mg/dL    Creatinine 0.67 0.50 - 1.05 mg/dL    eGFR >90 >60 mL/min/1.73m*2    Calcium 8.2 (L) 8.6 - 10.3 mg/dL    Albumin 3.1 (L) 3.4 - 5.0 g/dL    Alkaline Phosphatase 41 33 - 136 U/L    Total Protein 5.3 (L) 6.4 - 8.2 g/dL    AST 18 9 - 39 U/L    Bilirubin, Total 0.4 0.0 - 1.2 mg/dL    ALT 23 7 - 45 U/L   Magnesium   Result Value Ref Range    Magnesium 2.05 1.60 - 2.40 mg/dL   Phosphorus   Result Value Ref Range    Phosphorus 4.2 2.5 - 4.9 mg/dL   POCT GLUCOSE   Result Value Ref Range    POCT Glucose 157 (H) 74 - 99 mg/dL   POCT GLUCOSE   Result Value Ref Range    POCT Glucose 189 (H) 74 - 99 mg/dL   POCT GLUCOSE   Result Value Ref Range    POCT Glucose 109 (H) 74 - 99 mg/dL        Scheduled medications  Scheduled Medications[1]  Continuous medications  Continuous Medications[2]  PRN medications  PRN Medications[3]       Assessment/Plan   Assessment & Plan  Acute on chronic respiratory failure with hypoxia  On 6L, 4-6L is baseline.  Cont incentive spirometry and acapella.   Hypersensitivity pneumonitis (Multi)  Cont prednisone 40mg daily.  Resume ofev on discharge.  Advised pt to discuss w/ her pulmonlogist resuming cellcept.         Bowen Royal MD       [1] aspirin, 81 mg, oral, Daily  atorvastatin, 40 mg, oral, Daily  benzonatate, 200 mg, oral, TID  budesonide, 0.5 mg, nebulization, BID   And  ipratropium-albuteroL, 3 mL, nebulization, TID  dextromethorphan-guaifenesin, 1 tablet, oral, BID  enoxaparin, 40 mg, subcutaneous, q24h  fluticasone, 1 spray, Each Nostril, Daily  furosemide, 40 mg, intravenous, q24h  [Held by provider] furosemide, 20 mg, oral, Daily  insulin lispro, 0-10 Units, subcutaneous, TID AC  [Held by provider] mycophenolate, 1,000 mg, oral,  BID  pantoprazole, 40 mg, oral, Daily before breakfast  predniSONE, 40 mg, oral, Nightly    [2]    [3] PRN medications: acetaminophen, dextrose, dextrose, glucagon, glucagon, hydrocodone-homatropine, ipratropium-albuteroL, melatonin, oxygen, sennosides-docusate sodium

## 2025-05-10 NOTE — CARE PLAN
The patient's goals for the shift include      The clinical goals for the shift include no signs of respiratory distress    Over the shift, the patient did not make progress toward the following goals. Barriers to progression include . Recommendations to address these barriers include .

## 2025-05-10 NOTE — PROGRESS NOTES
"[facility] Hospitalist Progress Note       [phone]: Please page me for patient care issues.  [phone]: Please page night hospitalist for any issues.     Airam Tyson  :  1955(69 y.o.)  MRN:  [id number]  PCP: Yumiko Childress, APRN-CNP  LOS: 3  day(s) since admission    Assessment & Plan  Acute on chronic respiratory failure with hypoxia    Shortness of breath    Hypersensitivity pneumonitis (Multi)      Assessment and Plan:     Airam Tyson is a 69 y.o. female with a PMH of HTN, HLD, T2DM, GERD, COPD, DORA, ILD (on mycophenolate, off Nintedanib 2/2 recurrent ophthalmic herpes zoster ), CRF on 4L home O2, DVT ().   Lung biopsy from  was significant for hypersensitivity pneumonitis after which she was recommended to avoid birds.    Patient presented to [facility] with c/o worsening shortness of breath associated with hypoxia, chest tightness, palpitations, dizziness, lightheadedness, and productive cough with yellow sputum over the past 2 weeks.  Per patient, she went to her PCP and was noted to be hypoxic, saturating 70s to 80s while on 4 L via nasal cannula.  Subsequently she was rushed to the ED for further evaluation treatment.  In the ED, patient was placed on 8 L via nasal cannula.  CT chest was significant for \"interstitial pneumonia form of pulmonary fibrosis\" and she was started on Rocephin/Azithromycin with a dose of Lasix due to elevated BNP.  Patient denies other associated symptoms such as fever, nausea, vomiting, diarrhea, chest pain, abdominal pain.  Patient does not on any bird pets and has only 1 cat.  Lives with her daughter.    #ACRF w/ hypoxia on 4L home  #ILD flare  #Hypersensitivity pneumonitis  #AECOPD  #Severe pulmonary hypertension  #Non-IDDM II  #Chronic B/L LE edema  #Chronic HFpEF w/o decompensation  #DORA on CPAP  #Hx DVT currently not on OAC  -off azithromycin/ceftriaxone, low suspicion for PNA  -On steroid, bronchodilators, pulmonary toileting   -initiate IV " lasix and hold home PO lasix   -Wean down to home O2 as able  -SSI while inpatient  -Resume rest of home meds as indicated  -TTE: EF (55-60%), positive bubble study, elevated RSVP (60mmHg), severely enlarged R ventricle  -CS notes reviewed and recs appreciated: pulm    Disposition: await test results, await consultant recommendations, and await clinical improvement    DVT Prophylaxis: Subq Lovenox    Code status: Full Code  Diet: Adult diet Consistent Carb, 2-3 grams sodium; CCD 75 gm/meal; 1500 mL fluid    Level of MDM:  High    discussed with nurse, discussed with TCC/SW, I personally examined the patient, and I personally reviewed chart, data, labs radiology reports    Family Communication  Number :   Name of Designated Family Representative:       Total time spent: 35 minutes, of total time providing counseling or in coordination of care. Total time on this day of visit includes record and documentation review before and after visit including documentation and time not explicitly included on EMR time stamp      Subjective:   Interval History:  HPI  The patient complains of dyspnea  The patient feels their symptoms areimproving  no events or new concerns    Scheduled Meds:Scheduled Medications[1]  Continuous Infusions:Continuous Medications[2]  PRN Meds:PRN Medications[3]    Review of Systems   All other systems reviewed and are negative.    Interval Pertinent History:  Social History     Tobacco Use    Smoking status: Never    Smokeless tobacco: Never   Substance Use Topics    Alcohol use: Never         Objective:   Patient Vitals for the past 24 hrs:   BP Temp Temp src Pulse Resp SpO2 Weight   05/10/25 0800 (!) 139/93 36.5 °C (97.7 °F) Temporal -- -- -- --   05/10/25 0735 -- -- -- -- -- 98 % --   05/10/25 0450 110/72 36.4 °C (97.5 °F) Tympanic 89 18 100 % 71.5 kg (157 lb 9.6 oz)   05/09/25 2042 111/75 36.2 °C (97.2 °F) Tympanic 93 18 100 % --   05/09/25 2002 -- -- -- -- -- 100 % --   05/09/25 1617 114/78 36 °C  (96.8 °F) Temporal 90 -- 95 % --   25 1505 -- -- -- -- -- 100 % --   25 1207 119/75 -- -- -- -- 100 % --   25 1206 -- 37.3 °C (99.1 °F) Temporal 94 18 -- --       Average, Min, and Max for last 24 hours Vitals:  TEMPERATURE:  Temp  Av.5 °C (97.7 °F)  Min: 36 °C (96.8 °F)  Max: 37.3 °C (99.1 °F)    RESPIRATIONS RANGE: Resp  Av  Min: 18  Max: 18    PULSE RANGE: Pulse  Av.5  Min: 89  Max: 94    BLOOD PRESSURE RANGE:  Systolic (24hrs), Av , Min:110 , Max:139   ; Diastolic (24hrs), Av, Min:72, Max:93      PULSE OXIMETRY RANGE: SpO2  Av %  Min: 95 %  Max: 100 %  Body mass index is 26.26 kg/m².    I/O last 3 completed shifts:  In: 840 (11.8 mL/kg) [P.O.:840]  Out:  (28.7 mL/kg) [Urine:0 (0.8 mL/kg/hr)]  Weight: 71.5 kg   Weight change: 0.887 kg (1 lb 15.3 oz)     Physical Exam  Vitals and nursing note reviewed.   Constitutional:       Appearance: Normal appearance.   HENT:      Head: Normocephalic and atraumatic.      Right Ear: External ear normal.      Left Ear: External ear normal.      Nose: Nose normal.      Mouth/Throat:      Mouth: Mucous membranes are moist.   Eyes:      General: No scleral icterus.        Right eye: No discharge.         Left eye: No discharge.      Extraocular Movements: Extraocular movements intact.      Conjunctiva/sclera: Conjunctivae normal.      Pupils: Pupils are equal, round, and reactive to light.   Cardiovascular:      Rate and Rhythm: Normal rate and regular rhythm.   Pulmonary:      Effort: Pulmonary effort is normal.      Breath sounds: Wheezing (mild) present.   Abdominal:      General: Abdomen is flat. Bowel sounds are normal.      Palpations: Abdomen is soft.   Musculoskeletal:         General: Normal range of motion.      Right lower leg: Edema (+3) present.      Left lower leg: Edema (+3) present.   Skin:     General: Skin is warm and dry.      Capillary Refill: Capillary refill takes less than 2 seconds.   Neurological:       General: No focal deficit present.   Psychiatric:         Mood and Affect: Mood normal.         Thought Content: Thought content normal.         Judgment: Judgment normal.         Lab Results   Component Value Date     05/10/2025    K 4.4 05/10/2025    CL 99 05/10/2025    CO2 29 05/10/2025    BUN 20 05/10/2025    CREATININE 0.59 05/10/2025    GLUCOSE 157 (H) 05/10/2025    CALCIUM 8.3 (L) 05/10/2025    PROT 5.6 (L) 05/10/2025    BILITOT 0.4 05/10/2025    ALKPHOS 41 05/10/2025    AST 13 05/10/2025    ALT 22 05/10/2025    GLOB 3.5 03/10/2023       Lab Results   Component Value Date    WBC 9.5 05/10/2025    HGB 13.4 05/10/2025    HCT 41.6 05/10/2025    MCV 93 05/10/2025     05/10/2025    LYMPHOPCT 9.5 05/07/2025    RBC 4.47 05/10/2025    MCH 30.0 05/10/2025    MCHC 32.2 05/10/2025    RDW 13.6 05/10/2025    CRP 1.01 (A) 01/15/2022       Lab Results   Component Value Date    TSH 1.30 02/22/2022     Lab Results   Component Value Date    LACTATE 1.6 05/07/2025    TROPONINI <0.02 01/09/2022    BNP 1,547 (H) 05/07/2025    DDIMER 1,381 (A) 01/12/2022       IMAGES:  Encounter Date: 05/07/25   ECG 12 lead   Result Value    Ventricular Rate 107    Atrial Rate 107    FL Interval 146    QRS Duration 96    QT Interval 328    QTC Calculation(Bazett) 437    P Axis 34    R Axis 128    T Axis -35    QRS Count 18    Q Onset 204    P Onset 131    P Offset 175    T Offset 368    QTC Fredericia 397    Narrative    Sinus tachycardia  Left atrial enlargement  Incomplete right bundle branch block  Right ventricular hypertrophy  ST & T wave abnormality, consider inferior ischemia  ST & T wave abnormality, consider anterolateral ischemia  Abnormal ECG  When compared with ECG of 06-JAN-2022 11:55,  Incomplete right bundle branch block is now Present        Transthoracic Echo Complete  Result Date: 5/9/2025   [facility], [id number] [place] [address]               Tel [phone] and Fax [phone]  TRANSTHORACIC ECHOCARDIOGRAM REPORT  Patient Name:       ORVILLE GONZALEZEELANOR Reading Physician:    [id number] Manny Cordero MD Study Date:         5/8/2025            Ordering Provider:    [id number] HECTORSIMONA SONG MRN/PID:            [id number]            Fellow: Accession#:         [id number]        Nurse:                Angelia Cazares RN Date of Birth/Age:  1955 / 69      Sonographer:          Camille ott RDCS Gender assigned at  F                   Additional Staff: Birth: Height:             162.56 cm           Admit Date:           5/8/2025 Weight:             70.76 kg            Admission Status:     Inpatient -                                                               Routine BSA / BMI:          1.76 m2 / 26.78     Encounter#:           [id number]                     kg/m2 Blood Pressure:     108/73 mmHg         Department Location:  [facility] Non                                                               Invasive Study Type:    TRANSTHORACIC ECHO (TTE) COMPLETE Diagnosis/ICD: Acute respiratory failure with hypoxia-J96.01; Pulmonary                hypertension, unspecified-I27.20 Indication:    acute respiratory failure, PHTN CPT Code:      Echo Complete w Full Doppler-[id number] Patient History: Diabetes:          Yes Pertinent History: HTN, Hyperlipidemia, GERD, DORA, respiratory failure, Dyspnea,                    Palpitations, CHF, COPD and Previous DVT. Study Detail: The following Echo studies were performed: 2D, M-Mode, Doppler and               color flow. Technically challenging study due to difficulty               breathing. Agitated saline used as a contrast agent for               intraseptal flow evaluation. The patient was awake.  PHYSICIAN INTERPRETATION: Left Ventricle: Left ventricular ejection fraction is  normal, by visual estimate at 55-60%. There is concentric left ventricular hypertrophy. There are no regional left ventricular wall motion abnormalities. The left ventricular cavity size is decreased. There is moderately increased septal and mildly increased posterior left ventricular wall thickness. The interventricular septum is flattened in diastole ('D' shaped left ventricle), consistent with right ventricular volume overload. Left ventricular diastolic filling cannot be determined due to E/A wave fusion. Left Atrium: The left atrial size is normal. A bubble study using agitated saline was performed. Bubble study is positive. There is a delayed appearance of saline contrast bubbles noted in the left atrium, which is indicative of intrapulmonary shunting. Right Ventricle: The right ventricle is severely enlarged. There is moderately reduced right ventricular systolic function. Right Atrium: The right atrium is severely dilated. Aortic Valve: The aortic valve is trileaflet. There is minimal aortic valve cusp calcification. The aortic valve dimensionless index is 0.85. There is no evidence of aortic valve regurgitation. The peak instantaneous gradient of the aortic valve is 4 mmHg. The mean gradient of the aortic valve is 2 mmHg. Mitral Valve: The mitral valve is mildly thickened. There is trace mitral valve regurgitation. Tricuspid Valve: The tricuspid valve is structurally normal. There is mild to moderate tricuspid regurgitation. The Doppler estimated RVSP is moderately elevated at 62.0 mmHg. Pulmonic Valve: The pulmonic valve is structurally normal. There is physiologic pulmonic valve regurgitation. Pericardium: Small pericardial effusion. Aorta: The aortic root is normal. There is upper limits of normal dilatation of the ascending aorta. There is no dilatation of the aortic root. Systemic Veins: The inferior vena cava appears dilated, with IVC inspiratory collapse less than 50%. In comparison to the previous  echocardiogram(s): There are no prior studies on this patient for comparison purposes.  CONCLUSIONS:  1. Left ventricular ejection fraction is normal, by visual estimate at 55-60%.  2. Left ventricular cavity size is decreased.  3. Right ventricular volume overload.  4. There is moderately increased septal thickness.  5. There is moderately reduced right ventricular systolic function.  6. Severely enlarged right ventricle.  7. The right atrium is severely dilated.  8. Mild to moderate tricuspid regurgitation visualized.  9. Moderately elevated right ventricular systolic pressure. 10. A bubble study shows that an intrapulmonary shunting is present. 11. A bubble study using agitated saline was performed. Bubble study is positive. QUANTITATIVE DATA SUMMARY:  2D MEASUREMENTS:          Normal Ranges: IVSd:            1.29 cm  (0.6-1.1cm) LVPWd:           1.07 cm  (0.6-1.1cm) LVIDd:           3.24 cm  (3.9-5.9cm) LVIDs:           2.85 cm LV Mass Index:   118 g/m2 LVEDV Index:     25 ml/m2 LV % FS          12.0 %  LEFT ATRIUM:                  Normal Ranges: LA Vol A4C:        40.8 ml    (22+/-6mL/m2) LA Vol A2C:        22.7 ml LA Vol BP:         33.9 ml LA Vol Index A4C:  23.2ml/m2 LA Vol Index A2C:  12.9 ml/m2 LA Vol Index BP:   19.3 ml/m2 LA Area A4C:       15.8 cm2 LA Area A2C:       10.6 cm2 LA Major Axis A4C: 5.2 cm LA Major Axis A2C: 4.2 cm LA Volume Index:   19.3 ml/m2 LA Vol A4C:        38.7 ml LA Vol A2C:        21.8 ml LA Vol Index BSA:  17.2 ml/m2  RIGHT ATRIUM:          Normal Ranges: RA Area A4C:  28.0 cm2  AORTA MEASUREMENTS:         Normal Ranges: Ao Sinus, d:        2.80 cm (2.1-3.5cm) Asc Ao, d:          3.50 cm (2.1-3.4cm)  LV SYSTOLIC FUNCTION:                      Normal Ranges: EF-A4C View:    60 % (>=55%) EF-A2C View:    49 % EF-Biplane:     57 % EF-Visual:      58 % LV EF Reported: 58 %  LV DIASTOLIC FUNCTION:             Normal Ranges: MV Peak E:             0.23 m/s    (0.7-1.2 m/s) MV Peak A:              0.87 m/s    (0.42-0.7 m/s) E/A Ratio:             0.26        (1.0-2.2) MV e'                  0.060 m/s   (>8.0) MV lateral e'          0.08 m/s MV medial e'           0.04 m/s MV A Dur:              119.95 msec E/e' Ratio:            3.78        (<8.0)  AORTIC VALVE:                     Normal Ranges: AoV Vmax:                0.98 m/s (<=1.7m/s) AoV Peak PG:             3.8 mmHg (<20mmHg) AoV Mean P.1 mmHg (1.7-11.5mmHg) LVOT Max Adán:            0.84 m/s (<=1.1m/s) AoV VTI:                 15.26 cm (18-25cm) LVOT VTI:                13.00 cm LVOT Diameter:           1.99 cm  (1.8-2.4cm) AoV Area, VTI:           2.64 cm2 (2.5-5.5cm2) AoV Area,Vmax:           2.68 cm2 (2.5-4.5cm2) AoV Dimensionless Index: 0.85  RIGHT VENTRICLE: RV Basal 4.90 cm RV Mid   5.10 cm RV Major 7.3 cm TAPSE:   12.0 mm RV s'    0.06 m/s  TRICUSPID VALVE/RVSP:          Normal Ranges: Peak TR Velocity:     3.43 m/s RV Syst Pressure:     62 mmHg  (< 30mmHg) IVC Diam:             2.25 cm  AORTA: Asc Ao Diam 3.58 cm  [id number] Manny Cordero MD Electronically signed on 2025 at 8:54:42 AM  ** Final **     === 25 ===    XR CHEST 1 VIEW    - Impression -  1. Coarsening interstitial markings within bilateral lung fields in  particular left lung component which is likely chronic in relation to  interstitial lung disease.. However, superimposed component of  infiltrate in particular in the left lung and right lung apex not  excluded.    Signed by: Bimal Sim 2025 3:14 PM  Dictation workstation:   [id number]  === 25 ===    CT ANGIO CHEST FOR PULMONARY EMBOLISM    - Impression -  1. Worsening of usual interstitial pneumonia form of pulmonary  fibrosis as described above. Pulmonology follow-up recommended.    2. Diffuse intrathoracic lymphadenopathy, progressed, possibly  reactive on the basis of the aforementioned.    3. Evidence of severe pulmonary hypertension and right heart  dysfunction with dilated main  pulmonary artery, tortuous distal  pulmonary arteries, and dilated right heart chambers with marked  displacement of the interventricular septum toward the left  ventricle. Degree of main pulmonary artery dilatation is progressed  from prior study as is the degree of right heart chamber dilatation.    4. No acute pulmonary embolism.    MACRO:  None.    Signed by: Neo Beltran 5/7/2025 5:47 PM  Dictation workstation:   [id number]  === 05/07/25 ===    XR CHEST 1 VIEW    - Impression -  1. Coarsening interstitial markings within bilateral lung fields in  particular left lung component which is likely chronic in relation to  interstitial lung disease.. However, superimposed component of  infiltrate in particular in the left lung and right lung apex not  excluded.    Signed by: Bimal Sim 5/7/2025 3:14 PM  Dictation workstation:   [id number]      Additional results of the last 24 hours have been reviewed.    Dictated using Eduf[employer] 2.4  Proof read however unrecognized voice recognition errors may have occurred     Electronically signed by Denver Brunson DO on 05/10/25 at 8:55 AM           [1] aspirin, 81 mg, oral, Daily  atorvastatin, 40 mg, oral, Daily  benzonatate, 200 mg, oral, TID  budesonide, 0.5 mg, nebulization, BID   And  ipratropium-albuteroL, 3 mL, nebulization, TID  dextromethorphan-guaifenesin, 1 tablet, oral, BID  enoxaparin, 40 mg, subcutaneous, q24h  fluticasone, 1 spray, Each Nostril, Daily  furosemide, 40 mg, intravenous, q24h  [Held by provider] furosemide, 20 mg, oral, Daily  insulin lispro, 0-10 Units, subcutaneous, TID AC  [Held by provider] mycophenolate, 1,000 mg, oral, BID  pantoprazole, 40 mg, oral, Daily before breakfast  predniSONE, 40 mg, oral, Nightly     [2]    [3] PRN medications: acetaminophen, dextrose, dextrose, glucagon, glucagon, hydrocodone-homatropine, ipratropium-albuteroL, melatonin, oxygen, sennosides-docusate sodium

## 2025-05-11 ENCOUNTER — APPOINTMENT (OUTPATIENT)
Dept: CARDIOLOGY | Facility: HOSPITAL | Age: 70
DRG: 189 | End: 2025-05-11
Payer: MEDICARE

## 2025-05-11 ENCOUNTER — APPOINTMENT (OUTPATIENT)
Dept: RADIOLOGY | Facility: HOSPITAL | Age: 70
DRG: 189 | End: 2025-05-11
Payer: MEDICARE

## 2025-05-11 VITALS
RESPIRATION RATE: 16 BRPM | HEIGHT: 65 IN | OXYGEN SATURATION: 98 % | HEART RATE: 105 BPM | WEIGHT: 153 LBS | DIASTOLIC BLOOD PRESSURE: 68 MMHG | SYSTOLIC BLOOD PRESSURE: 102 MMHG | TEMPERATURE: 98.1 F | BODY MASS INDEX: 25.49 KG/M2

## 2025-05-11 LAB
ALBUMIN SERPL BCP-MCNC: 3.1 G/DL (ref 3.4–5)
ALP SERPL-CCNC: 38 U/L (ref 33–136)
ALT SERPL W P-5'-P-CCNC: 21 U/L (ref 7–45)
ANION GAP SERPL CALC-SCNC: 9 MMOL/L (ref 10–20)
AST SERPL W P-5'-P-CCNC: 11 U/L (ref 9–39)
BACTERIA BLD CULT: NORMAL
BACTERIA BLD CULT: NORMAL
BILIRUB SERPL-MCNC: 0.4 MG/DL (ref 0–1.2)
BUN SERPL-MCNC: 21 MG/DL (ref 6–23)
CALCIUM SERPL-MCNC: 7.9 MG/DL (ref 8.6–10.3)
CHLORIDE SERPL-SCNC: 95 MMOL/L (ref 98–107)
CO2 SERPL-SCNC: 38 MMOL/L (ref 21–32)
CREAT SERPL-MCNC: 0.65 MG/DL (ref 0.5–1.05)
EGFRCR SERPLBLD CKD-EPI 2021: >90 ML/MIN/1.73M*2
ERYTHROCYTE [DISTWIDTH] IN BLOOD BY AUTOMATED COUNT: 13.6 % (ref 11.5–14.5)
GLUCOSE BLD MANUAL STRIP-MCNC: 107 MG/DL (ref 74–99)
GLUCOSE BLD MANUAL STRIP-MCNC: 149 MG/DL (ref 74–99)
GLUCOSE BLD MANUAL STRIP-MCNC: 150 MG/DL (ref 74–99)
GLUCOSE BLD MANUAL STRIP-MCNC: 203 MG/DL (ref 74–99)
GLUCOSE SERPL-MCNC: 165 MG/DL (ref 74–99)
HCT VFR BLD AUTO: 40.5 % (ref 36–46)
HGB BLD-MCNC: 12.8 G/DL (ref 12–16)
MAGNESIUM SERPL-MCNC: 1.84 MG/DL (ref 1.6–2.4)
MCH RBC QN AUTO: 29 PG (ref 26–34)
MCHC RBC AUTO-ENTMCNC: 31.6 G/DL (ref 32–36)
MCV RBC AUTO: 92 FL (ref 80–100)
NRBC BLD-RTO: 0 /100 WBCS (ref 0–0)
PHOSPHATE SERPL-MCNC: 3.5 MG/DL (ref 2.5–4.9)
PLATELET # BLD AUTO: 235 X10*3/UL (ref 150–450)
POTASSIUM SERPL-SCNC: 4 MMOL/L (ref 3.5–5.3)
PROT SERPL-MCNC: 5.2 G/DL (ref 6.4–8.2)
RBC # BLD AUTO: 4.42 X10*6/UL (ref 4–5.2)
SODIUM SERPL-SCNC: 138 MMOL/L (ref 136–145)
WBC # BLD AUTO: 7.9 X10*3/UL (ref 4.4–11.3)

## 2025-05-11 PROCEDURE — 2500000001 HC RX 250 WO HCPCS SELF ADMINISTERED DRUGS (ALT 637 FOR MEDICARE OP)

## 2025-05-11 PROCEDURE — 2500000004 HC RX 250 GENERAL PHARMACY W/ HCPCS (ALT 636 FOR OP/ED): Mod: JZ

## 2025-05-11 PROCEDURE — 2500000004 HC RX 250 GENERAL PHARMACY W/ HCPCS (ALT 636 FOR OP/ED): Mod: JZ | Performed by: INTERNAL MEDICINE

## 2025-05-11 PROCEDURE — 2500000005 HC RX 250 GENERAL PHARMACY W/O HCPCS: Performed by: INTERNAL MEDICINE

## 2025-05-11 PROCEDURE — 83735 ASSAY OF MAGNESIUM: CPT

## 2025-05-11 PROCEDURE — 93005 ELECTROCARDIOGRAM TRACING: CPT

## 2025-05-11 PROCEDURE — 2500000002 HC RX 250 W HCPCS SELF ADMINISTERED DRUGS (ALT 637 FOR MEDICARE OP, ALT 636 FOR OP/ED): Performed by: INTERNAL MEDICINE

## 2025-05-11 PROCEDURE — 85027 COMPLETE CBC AUTOMATED: CPT

## 2025-05-11 PROCEDURE — 2500000004 HC RX 250 GENERAL PHARMACY W/ HCPCS (ALT 636 FOR OP/ED): Performed by: INTERNAL MEDICINE

## 2025-05-11 PROCEDURE — 82947 ASSAY GLUCOSE BLOOD QUANT: CPT

## 2025-05-11 PROCEDURE — 71045 X-RAY EXAM CHEST 1 VIEW: CPT

## 2025-05-11 PROCEDURE — 94640 AIRWAY INHALATION TREATMENT: CPT

## 2025-05-11 PROCEDURE — 84100 ASSAY OF PHOSPHORUS: CPT

## 2025-05-11 PROCEDURE — 80053 COMPREHEN METABOLIC PANEL: CPT

## 2025-05-11 PROCEDURE — 71045 X-RAY EXAM CHEST 1 VIEW: CPT | Performed by: RADIOLOGY

## 2025-05-11 PROCEDURE — 99233 SBSQ HOSP IP/OBS HIGH 50: CPT | Performed by: INTERNAL MEDICINE

## 2025-05-11 PROCEDURE — 99232 SBSQ HOSP IP/OBS MODERATE 35: CPT | Performed by: INTERNAL MEDICINE

## 2025-05-11 PROCEDURE — 1210000001 HC SEMI-PRIVATE ROOM DAILY

## 2025-05-11 PROCEDURE — 2500000001 HC RX 250 WO HCPCS SELF ADMINISTERED DRUGS (ALT 637 FOR MEDICARE OP): Performed by: INTERNAL MEDICINE

## 2025-05-11 PROCEDURE — 36415 COLL VENOUS BLD VENIPUNCTURE: CPT

## 2025-05-11 PROCEDURE — 94760 N-INVAS EAR/PLS OXIMETRY 1: CPT

## 2025-05-11 PROCEDURE — 2500000002 HC RX 250 W HCPCS SELF ADMINISTERED DRUGS (ALT 637 FOR MEDICARE OP, ALT 636 FOR OP/ED)

## 2025-05-11 RX ORDER — TORSEMIDE 20 MG/1
20 TABLET ORAL DAILY
Qty: 90 TABLET | Refills: 1 | Status: SHIPPED | OUTPATIENT
Start: 2025-05-11 | End: 2025-05-12

## 2025-05-11 RX ORDER — BENZONATATE 200 MG/1
200 CAPSULE ORAL 3 TIMES DAILY
Qty: 60 CAPSULE | Refills: 0 | Status: SHIPPED | OUTPATIENT
Start: 2025-05-11 | End: 2025-05-12

## 2025-05-11 RX ORDER — PREDNISONE 10 MG/1
TABLET ORAL
Qty: 53 TABLET | Refills: 0 | Status: SHIPPED | OUTPATIENT
Start: 2025-05-11 | End: 2025-05-12

## 2025-05-11 RX ADMIN — PREDNISONE 40 MG: 20 TABLET ORAL at 20:19

## 2025-05-11 RX ADMIN — Medication 4 L/MIN: at 07:32

## 2025-05-11 RX ADMIN — HYDROCODONE BITARTRATE AND HOMATROPINE METHYLBROMIDE 5 ML: 1.5; 5 SOLUTION ORAL at 20:19

## 2025-05-11 RX ADMIN — BENZONATATE 200 MG: 100 CAPSULE ORAL at 09:16

## 2025-05-11 RX ADMIN — HYDROCODONE BITARTRATE AND HOMATROPINE METHYLBROMIDE 5 ML: 1.5; 5 SOLUTION ORAL at 11:06

## 2025-05-11 RX ADMIN — ACETAMINOPHEN 975 MG: 325 TABLET, FILM COATED ORAL at 16:27

## 2025-05-11 RX ADMIN — BUDESONIDE 0.5 MG: 0.5 INHALANT ORAL at 07:34

## 2025-05-11 RX ADMIN — IPRATROPIUM BROMIDE AND ALBUTEROL SULFATE 3 ML: 2.5; .5 SOLUTION RESPIRATORY (INHALATION) at 12:53

## 2025-05-11 RX ADMIN — Medication 4 L/MIN: at 12:53

## 2025-05-11 RX ADMIN — FUROSEMIDE 40 MG: 10 INJECTION, SOLUTION INTRAMUSCULAR; INTRAVENOUS at 09:16

## 2025-05-11 RX ADMIN — IPRATROPIUM BROMIDE AND ALBUTEROL SULFATE 3 ML: 2.5; .5 SOLUTION RESPIRATORY (INHALATION) at 07:32

## 2025-05-11 RX ADMIN — ACETAMINOPHEN 975 MG: 325 TABLET, FILM COATED ORAL at 06:32

## 2025-05-11 RX ADMIN — BENZONATATE 200 MG: 100 CAPSULE ORAL at 16:25

## 2025-05-11 RX ADMIN — PANTOPRAZOLE SODIUM 40 MG: 40 TABLET, DELAYED RELEASE ORAL at 06:29

## 2025-05-11 RX ADMIN — FUROSEMIDE 40 MG: 10 INJECTION, SOLUTION INTRAMUSCULAR; INTRAVENOUS at 20:19

## 2025-05-11 RX ADMIN — BUDESONIDE 0.5 MG: 0.5 INHALANT ORAL at 20:10

## 2025-05-11 RX ADMIN — FLUTICASONE PROPIONATE 1 SPRAY: 50 SPRAY, METERED NASAL at 09:30

## 2025-05-11 RX ADMIN — ASPIRIN 81 MG: 81 TABLET, CHEWABLE ORAL at 09:16

## 2025-05-11 RX ADMIN — BENZONATATE 200 MG: 100 CAPSULE ORAL at 20:19

## 2025-05-11 RX ADMIN — INSULIN LISPRO 4 UNITS: 100 INJECTION, SOLUTION INTRAVENOUS; SUBCUTANEOUS at 11:41

## 2025-05-11 RX ADMIN — ENOXAPARIN SODIUM 40 MG: 40 INJECTION SUBCUTANEOUS at 20:19

## 2025-05-11 RX ADMIN — IPRATROPIUM BROMIDE AND ALBUTEROL SULFATE 3 ML: 2.5; .5 SOLUTION RESPIRATORY (INHALATION) at 20:11

## 2025-05-11 RX ADMIN — GUAIFENESIN, DEXTROMETHORPHAN HBR 1 TABLET: 600; 30 TABLET ORAL at 09:16

## 2025-05-11 RX ADMIN — ATORVASTATIN CALCIUM 40 MG: 40 TABLET, FILM COATED ORAL at 09:16

## 2025-05-11 ASSESSMENT — COGNITIVE AND FUNCTIONAL STATUS - GENERAL
CLIMB 3 TO 5 STEPS WITH RAILING: A LITTLE
MOBILITY SCORE: 23
WALKING IN HOSPITAL ROOM: A LITTLE
CLIMB 3 TO 5 STEPS WITH RAILING: A LITTLE
MOBILITY SCORE: 22
DAILY ACTIVITIY SCORE: 24
DAILY ACTIVITIY SCORE: 24

## 2025-05-11 ASSESSMENT — PAIN SCALES - GENERAL
PAINLEVEL_OUTOF10: 0 - NO PAIN
PAINLEVEL_OUTOF10: 4

## 2025-05-11 ASSESSMENT — PAIN DESCRIPTION - LOCATION: LOCATION: HEAD

## 2025-05-11 ASSESSMENT — PAIN - FUNCTIONAL ASSESSMENT
PAIN_FUNCTIONAL_ASSESSMENT: 0-10

## 2025-05-11 ASSESSMENT — PAIN DESCRIPTION - DESCRIPTORS: DESCRIPTORS: ACHING

## 2025-05-11 NOTE — PROGRESS NOTES
"[facility] Hospitalist Progress Note       [phone]: Please page me for patient care issues.  [phone]: Please page night hospitalist for any issues.     Airam Tyson  :  1955(69 y.o.)  MRN:  [id number]  PCP: Yumiko Childress, ABIMAEL-CNP  LOS: 4  day(s) since admission    Assessment & Plan  Acute on chronic respiratory failure with hypoxia    Shortness of breath    Hypersensitivity pneumonitis (Multi)      Assessment and Plan:     Airam Tyson is a 69 y.o. female with a PMH of HTN, HLD, T2DM, GERD, COPD, DORA, ILD (on mycophenolate, off Nintedanib 2/2 recurrent ophthalmic herpes zoster ), CRF on 4L home O2, DVT ().   Lung biopsy from  was significant for hypersensitivity pneumonitis after which she was recommended to avoid birds.    Patient presented to [facility] with c/o worsening shortness of breath associated with hypoxia, chest tightness, palpitations, dizziness, lightheadedness, and productive cough with yellow sputum over the past 2 weeks.  Per patient, she went to her PCP and was noted to be hypoxic, saturating 70s to 80s while on 4 L via nasal cannula.  Subsequently she was rushed to the ED for further evaluation treatment.  In the ED, patient was placed on 8 L via nasal cannula.  CT chest was significant for \"interstitial pneumonia form of pulmonary fibrosis\" and she was started on Rocephin/Azithromycin with a dose of Lasix due to elevated BNP.  Patient denies other associated symptoms such as fever, nausea, vomiting, diarrhea, chest pain, abdominal pain.  Patient does not on any bird pets and has only 1 cat.  Lives with her daughter.    #ACRF w/ hypoxia on 4L home  #ILD flare  #Hypersensitivity pneumonitis  #AECOPD  #Severe pulmonary hypertension  #Suspected Dysphagia 2/2 aspiration of liquid c/b hypoxia requiring NRB (25)  #Non-IDDM II  #Chronic B/L LE edema  #Chronic HFpEF w/o decompensation  #DORA on CPAP  #Hx DVT currently not on OAC  -off azithromycin/ceftriaxone, low " suspicion for PNA  -On steroid, bronchodilators, pulmonary toileting   -initiate IV lasix and hold home PO lasix   -check MBS  -Wean down to home O2 as able  -SSI while inpatient  -Resume rest of home meds as indicated  -TTE: EF (55-60%), positive bubble study, elevated RSVP (60mmHg), severely enlarged R ventricle  -CS notes reviewed and recs appreciated: pulm, SLP    Disposition: anticipate discharge 5/11 vs 5/12 pending MBS and when back to home O2 level    DVT Prophylaxis: Subq Lovenox    Code status: Full Code  Diet: Adult diet Consistent Carb, 2-3 grams sodium; CCD 75 gm/meal; 1500 mL fluid    Level of MDM:  High    discussed with nurse, discussed with TCC/SW, I personally examined the patient, and I personally reviewed chart, data, labs radiology reports    Family Communication  Number :   Name of Designated Family Representative:       Total time spent: 35 minutes, of total time providing counseling or in coordination of care. Total time on this day of visit includes record and documentation review before and after visit including documentation and time not explicitly included on EMR time stamp      Subjective:   Interval History:  HPI  The patient complains of dyspnea  The patient feels their symptoms areimproving  no events or new concerns    Scheduled Meds:Scheduled Medications[1]  Continuous Infusions:Continuous Medications[2]  PRN Meds:PRN Medications[3]    Review of Systems   All other systems reviewed and are negative.    Interval Pertinent History:  Social History     Tobacco Use    Smoking status: Never    Smokeless tobacco: Never   Substance Use Topics    Alcohol use: Never         Objective:   Patient Vitals for the past 24 hrs:   BP Temp Temp src Pulse Resp SpO2 Weight   05/11/25 1253 -- -- -- -- -- 98 % --   05/11/25 0732 120/80 36.5 °C (97.7 °F) Temporal 89 18 98 % --   05/11/25 0452 -- -- -- -- -- -- 69.4 kg (153 lb)   05/11/25 0421 137/84 36.6 °C (97.9 °F) Temporal 79 18 100 % --   05/11/25  0035 112/69 -- -- -- -- -- --   05/10/25 1952 104/67 36.7 °C (98.1 °F) Temporal 99 20 94 % --       Average, Min, and Max for last 24 hours Vitals:  TEMPERATURE:  Temp  Av.6 °C (97.9 °F)  Min: 36.5 °C (97.7 °F)  Max: 36.7 °C (98.1 °F)    RESPIRATIONS RANGE: Resp  Av.7  Min: 18  Max: 20    PULSE RANGE: Pulse  Av  Min: 79  Max: 99    BLOOD PRESSURE RANGE:  Systolic (24hrs), Av , Min:104 , Max:137   ; Diastolic (24hrs), Av, Min:67, Max:84      PULSE OXIMETRY RANGE: SpO2  Av.5 %  Min: 94 %  Max: 100 %  Body mass index is 25.49 kg/m².    I/O last 3 completed shifts:  In: 1421 (20.5 mL/kg) [P.O.:1421]  Out: 3925 (56.6 mL/kg) [Urine:3925 (1.6 mL/kg/hr)]  Weight: 69.4 kg   Weight change: -2.087 kg (-4 lb 9.6 oz)     Physical Exam  Vitals and nursing note reviewed.   Constitutional:       Appearance: Normal appearance.   HENT:      Head: Normocephalic and atraumatic.      Right Ear: External ear normal.      Left Ear: External ear normal.      Nose: Nose normal.      Mouth/Throat:      Mouth: Mucous membranes are moist.   Eyes:      General: No scleral icterus.        Right eye: No discharge.         Left eye: No discharge.      Extraocular Movements: Extraocular movements intact.      Conjunctiva/sclera: Conjunctivae normal.      Pupils: Pupils are equal, round, and reactive to light.   Cardiovascular:      Rate and Rhythm: Normal rate and regular rhythm.   Pulmonary:      Effort: Respiratory distress present.      Breath sounds: Wheezing (mild) and rales present.   Abdominal:      General: Abdomen is flat. Bowel sounds are normal.      Palpations: Abdomen is soft.   Musculoskeletal:         General: Normal range of motion.      Right lower leg: Edema (+2 w/ sig improvement) present.      Left lower leg: Edema (+2 w/ sig improvement) present.   Skin:     General: Skin is warm and dry.      Capillary Refill: Capillary refill takes less than 2 seconds.   Neurological:      General: No focal  deficit present.   Psychiatric:         Mood and Affect: Mood normal.         Thought Content: Thought content normal.         Judgment: Judgment normal.         Lab Results   Component Value Date     05/11/2025    K 4.0 05/11/2025    CL 95 (L) 05/11/2025    CO2 38 (H) 05/11/2025    BUN 21 05/11/2025    CREATININE 0.65 05/11/2025    GLUCOSE 165 (H) 05/11/2025    CALCIUM 7.9 (L) 05/11/2025    PROT 5.2 (L) 05/11/2025    BILITOT 0.4 05/11/2025    ALKPHOS 38 05/11/2025    AST 11 05/11/2025    ALT 21 05/11/2025    GLOB 3.5 03/10/2023       Lab Results   Component Value Date    WBC 7.9 05/11/2025    HGB 12.8 05/11/2025    HCT 40.5 05/11/2025    MCV 92 05/11/2025     05/11/2025    LYMPHOPCT 9.5 05/07/2025    RBC 4.42 05/11/2025    MCH 29.0 05/11/2025    MCHC 31.6 (L) 05/11/2025    RDW 13.6 05/11/2025    CRP 1.01 (A) 01/15/2022       Lab Results   Component Value Date    TSH 1.30 02/22/2022     Lab Results   Component Value Date    LACTATE 1.6 05/07/2025    TROPONINI <0.02 01/09/2022    BNP 1,547 (H) 05/07/2025    DDIMER 1,381 (A) 01/12/2022       IMAGES:  Encounter Date: 05/07/25   ECG 12 lead   Result Value    Ventricular Rate 107    Atrial Rate 107    MO Interval 146    QRS Duration 96    QT Interval 328    QTC Calculation(Bazett) 437    P Axis 34    R Axis 128    T Axis -35    QRS Count 18    Q Onset 204    P Onset 131    P Offset 175    T Offset 368    QTC Fredericia 397    Narrative    Sinus tachycardia  Left atrial enlargement  Incomplete right bundle branch block  Right ventricular hypertrophy  ST & T wave abnormality, consider inferior ischemia  ST & T wave abnormality, consider anterolateral ischemia  Abnormal ECG  When compared with ECG of 06-JAN-2022 11:55,  Incomplete right bundle branch block is now Present        Transthoracic Echo Complete  Result Date: 5/9/2025   [facility], [address] [address]               Tel [phone] and Fax [phone] TRANSTHORACIC  ECHOCARDIOGRAM REPORT  Patient Name:       ORVILLE GONZALEZGILBERTOCHARISSE Reading Physician:    [id number] Manny Cordero MD Study Date:         5/8/2025            Ordering Provider:    [id number] REBABernardoSIMONA SONG MRN/PID:            [id number]            Fellow: Accession#:         [id number]        Nurse:                Angelia Cazares RN Date of Birth/Age:  1955 / 69      Sonographer:          Camille ott RDCS Gender assigned at  F                   Additional Staff: Birth: Height:             162.56 cm           Admit Date:           5/8/2025 Weight:             70.76 kg            Admission Status:     Inpatient -                                                               Routine BSA / BMI:          1.76 m2 / 26.78     Encounter#:           [id number]                     kg/m2 Blood Pressure:     108/73 mmHg         Department Location:  [facility] Non                                                               Invasive Study Type:    TRANSTHORACIC ECHO (TTE) COMPLETE Diagnosis/ICD: Acute respiratory failure with hypoxia-J96.01; Pulmonary                hypertension, unspecified-I27.20 Indication:    acute respiratory failure, PHTN CPT Code:      Echo Complete w Full Doppler-18740 Patient History: Diabetes:          Yes Pertinent History: HTN, Hyperlipidemia, GERD, DORA, respiratory failure, Dyspnea,                    Palpitations, CHF, COPD and Previous DVT. Study Detail: The following Echo studies were performed: 2D, M-Mode, Doppler and               color flow. Technically challenging study due to difficulty               breathing. Agitated saline used as a contrast agent for               intraseptal flow evaluation. The patient was awake.  PHYSICIAN INTERPRETATION: Left Ventricle: Left ventricular ejection fraction is normal, by  visual estimate at 55-60%. There is concentric left ventricular hypertrophy. There are no regional left ventricular wall motion abnormalities. The left ventricular cavity size is decreased. There is moderately increased septal and mildly increased posterior left ventricular wall thickness. The interventricular septum is flattened in diastole ('D' shaped left ventricle), consistent with right ventricular volume overload. Left ventricular diastolic filling cannot be determined due to E/A wave fusion. Left Atrium: The left atrial size is normal. A bubble study using agitated saline was performed. Bubble study is positive. There is a delayed appearance of saline contrast bubbles noted in the left atrium, which is indicative of intrapulmonary shunting. Right Ventricle: The right ventricle is severely enlarged. There is moderately reduced right ventricular systolic function. Right Atrium: The right atrium is severely dilated. Aortic Valve: The aortic valve is trileaflet. There is minimal aortic valve cusp calcification. The aortic valve dimensionless index is 0.85. There is no evidence of aortic valve regurgitation. The peak instantaneous gradient of the aortic valve is 4 mmHg. The mean gradient of the aortic valve is 2 mmHg. Mitral Valve: The mitral valve is mildly thickened. There is trace mitral valve regurgitation. Tricuspid Valve: The tricuspid valve is structurally normal. There is mild to moderate tricuspid regurgitation. The Doppler estimated RVSP is moderately elevated at 62.0 mmHg. Pulmonic Valve: The pulmonic valve is structurally normal. There is physiologic pulmonic valve regurgitation. Pericardium: Small pericardial effusion. Aorta: The aortic root is normal. There is upper limits of normal dilatation of the ascending aorta. There is no dilatation of the aortic root. Systemic Veins: The inferior vena cava appears dilated, with IVC inspiratory collapse less than 50%. In comparison to the previous  echocardiogram(s): There are no prior studies on this patient for comparison purposes.  CONCLUSIONS:  1. Left ventricular ejection fraction is normal, by visual estimate at 55-60%.  2. Left ventricular cavity size is decreased.  3. Right ventricular volume overload.  4. There is moderately increased septal thickness.  5. There is moderately reduced right ventricular systolic function.  6. Severely enlarged right ventricle.  7. The right atrium is severely dilated.  8. Mild to moderate tricuspid regurgitation visualized.  9. Moderately elevated right ventricular systolic pressure. 10. A bubble study shows that an intrapulmonary shunting is present. 11. A bubble study using agitated saline was performed. Bubble study is positive. QUANTITATIVE DATA SUMMARY:  2D MEASUREMENTS:          Normal Ranges: IVSd:            1.29 cm  (0.6-1.1cm) LVPWd:           1.07 cm  (0.6-1.1cm) LVIDd:           3.24 cm  (3.9-5.9cm) LVIDs:           2.85 cm LV Mass Index:   118 g/m2 LVEDV Index:     25 ml/m2 LV % FS          12.0 %  LEFT ATRIUM:                  Normal Ranges: LA Vol A4C:        40.8 ml    (22+/-6mL/m2) LA Vol A2C:        22.7 ml LA Vol BP:         33.9 ml LA Vol Index A4C:  23.2ml/m2 LA Vol Index A2C:  12.9 ml/m2 LA Vol Index BP:   19.3 ml/m2 LA Area A4C:       15.8 cm2 LA Area A2C:       10.6 cm2 LA Major Axis A4C: 5.2 cm LA Major Axis A2C: 4.2 cm LA Volume Index:   19.3 ml/m2 LA Vol A4C:        38.7 ml LA Vol A2C:        21.8 ml LA Vol Index BSA:  17.2 ml/m2  RIGHT ATRIUM:          Normal Ranges: RA Area A4C:  28.0 cm2  AORTA MEASUREMENTS:         Normal Ranges: Ao Sinus, d:        2.80 cm (2.1-3.5cm) Asc Ao, d:          3.50 cm (2.1-3.4cm)  LV SYSTOLIC FUNCTION:                      Normal Ranges: EF-A4C View:    60 % (>=55%) EF-A2C View:    49 % EF-Biplane:     57 % EF-Visual:      58 % LV EF Reported: 58 %  LV DIASTOLIC FUNCTION:             Normal Ranges: MV Peak E:             0.23 m/s    (0.7-1.2 m/s) MV Peak A:              0.87 m/s    (0.42-0.7 m/s) E/A Ratio:             0.26        (1.0-2.2) MV e'                  0.060 m/s   (>8.0) MV lateral e'          0.08 m/s MV medial e'           0.04 m/s MV A Dur:              119.95 msec E/e' Ratio:            3.78        (<8.0)  AORTIC VALVE:                     Normal Ranges: AoV Vmax:                0.98 m/s (<=1.7m/s) AoV Peak PG:             3.8 mmHg (<20mmHg) AoV Mean P.1 mmHg (1.7-11.5mmHg) LVOT Max Adán:            0.84 m/s (<=1.1m/s) AoV VTI:                 15.26 cm (18-25cm) LVOT VTI:                13.00 cm LVOT Diameter:           1.99 cm  (1.8-2.4cm) AoV Area, VTI:           2.64 cm2 (2.5-5.5cm2) AoV Area,Vmax:           2.68 cm2 (2.5-4.5cm2) AoV Dimensionless Index: 0.85  RIGHT VENTRICLE: RV Basal 4.90 cm RV Mid   5.10 cm RV Major 7.3 cm TAPSE:   12.0 mm RV s'    0.06 m/s  TRICUSPID VALVE/RVSP:          Normal Ranges: Peak TR Velocity:     3.43 m/s RV Syst Pressure:     62 mmHg  (< 30mmHg) IVC Diam:             2.25 cm  AORTA: Asc Ao Diam 3.58 cm  [id number] Manny Cordero MD Electronically signed on 2025 at 8:54:42 AM  ** Final **     === 25 ===    XR CHEST 1 VIEW    - Impression -  1. Coarsening interstitial markings within bilateral lung fields in  particular left lung component which is likely chronic in relation to  interstitial lung disease.. However, superimposed component of  infiltrate in particular in the left lung and right lung apex not  excluded.    Signed by: Bimal Sim 2025 3:14 PM  Dictation workstation:   [id number]  === 25 ===    CT ANGIO CHEST FOR PULMONARY EMBOLISM    - Impression -  1. Worsening of usual interstitial pneumonia form of pulmonary  fibrosis as described above. Pulmonology follow-up recommended.    2. Diffuse intrathoracic lymphadenopathy, progressed, possibly  reactive on the basis of the aforementioned.    3. Evidence of severe pulmonary hypertension and right heart  dysfunction with dilated main  pulmonary artery, tortuous distal  pulmonary arteries, and dilated right heart chambers with marked  displacement of the interventricular septum toward the left  ventricle. Degree of main pulmonary artery dilatation is progressed  from prior study as is the degree of right heart chamber dilatation.    4. No acute pulmonary embolism.    MACRO:  None.    Signed by: Neo Beltran 5/7/2025 5:47 PM  Dictation workstation:   [id number]  === 05/07/25 ===    XR CHEST 1 VIEW    - Impression -  1. Coarsening interstitial markings within bilateral lung fields in  particular left lung component which is likely chronic in relation to  interstitial lung disease.. However, superimposed component of  infiltrate in particular in the left lung and right lung apex not  excluded.    Signed by: Bimal Sim 5/7/2025 3:14 PM  Dictation workstation:   [id number]      Additional results of the last 24 hours have been reviewed.    Dictated using [employer] Version 2.4  Proof read however unrecognized voice recognition errors may have occurred     Electronically signed by Denver Brunson DO on 05/11/25 at 2:33 PM           [1] aspirin, 81 mg, oral, Daily  atorvastatin, 40 mg, oral, Daily  benzonatate, 200 mg, oral, TID  budesonide, 0.5 mg, nebulization, BID   And  ipratropium-albuteroL, 3 mL, nebulization, TID  enoxaparin, 40 mg, subcutaneous, q24h  fluticasone, 1 spray, Each Nostril, Daily  furosemide, 40 mg, intravenous, q12h  [Held by provider] furosemide, 20 mg, oral, Daily  insulin lispro, 0-10 Units, subcutaneous, TID AC  [Held by provider] mycophenolate, 1,000 mg, oral, BID  pantoprazole, 40 mg, oral, Daily before breakfast  predniSONE, 40 mg, oral, Nightly     [2]    [3] PRN medications: acetaminophen, dextrose, dextrose, glucagon, glucagon, hydrocodone-homatropine, ipratropium-albuteroL, melatonin, oxygen, sennosides-docusate sodium

## 2025-05-11 NOTE — ASSESSMENT & PLAN NOTE
On 5L, 4-6L is baseline.  Cont incentive spirometry and acapella. Acute hypoxic episode today likely 2/2 aspiration.  Recommend speech eval at this time.  Pt already had concerns and was to have a swallow study next month as outpatient.

## 2025-05-11 NOTE — PROGRESS NOTES
"Airam Tyson is a 69 y.o. female on day 4 of admission presenting with Acute on chronic respiratory failure with hypoxia.    Subjective   Had coughing episode after drinking something today requiring significantly more O2.       Objective     Physical Exam  Vitals reviewed.   Constitutional:       Appearance: She is ill-appearing.   HENT:      Head: Normocephalic and atraumatic.   Eyes:      Extraocular Movements: Extraocular movements intact.   Cardiovascular:      Rate and Rhythm: Normal rate and regular rhythm.      Heart sounds: Normal heart sounds.   Pulmonary:      Effort: Pulmonary effort is normal.      Comments: Coarse breath sounds bilaterally   Abdominal:      Palpations: Abdomen is soft.      Tenderness: There is no abdominal tenderness.   Musculoskeletal:      Cervical back: Normal range of motion.   Skin:     General: Skin is warm.   Neurological:      General: No focal deficit present.      Mental Status: She is alert and oriented to person, place, and time. Mental status is at baseline.   Psychiatric:         Mood and Affect: Mood normal.         Behavior: Behavior normal.         Last Recorded Vitals  Blood pressure 120/80, pulse 89, temperature 36.5 °C (97.7 °F), temperature source Temporal, resp. rate 18, height 1.65 m (5' 4.96\"), weight 69.4 kg (153 lb), SpO2 98%.  Intake/Output last 3 Shifts:  I/O last 3 completed shifts:  In: 1421 (20.5 mL/kg) [P.O.:1421]  Out: 3925 (56.6 mL/kg) [Urine:3925 (1.6 mL/kg/hr)]  Weight: 69.4 kg     Relevant Results  Results for orders placed or performed during the hospital encounter of 05/07/25 (from the past 24 hours)   POCT GLUCOSE   Result Value Ref Range    POCT Glucose 123 (H) 74 - 99 mg/dL   POCT GLUCOSE   Result Value Ref Range    POCT Glucose 139 (H) 74 - 99 mg/dL   CBC   Result Value Ref Range    WBC 7.9 4.4 - 11.3 x10*3/uL    nRBC 0.0 0.0 - 0.0 /100 WBCs    RBC 4.42 4.00 - 5.20 x10*6/uL    Hemoglobin 12.8 12.0 - 16.0 g/dL    Hematocrit 40.5 36.0 - " 46.0 %    MCV 92 80 - 100 fL    MCH 29.0 26.0 - 34.0 pg    MCHC 31.6 (L) 32.0 - 36.0 g/dL    RDW 13.6 11.5 - 14.5 %    Platelets 235 150 - 450 x10*3/uL   Comprehensive Metabolic Panel   Result Value Ref Range    Glucose 165 (H) 74 - 99 mg/dL    Sodium 138 136 - 145 mmol/L    Potassium 4.0 3.5 - 5.3 mmol/L    Chloride 95 (L) 98 - 107 mmol/L    Bicarbonate 38 (H) 21 - 32 mmol/L    Anion Gap 9 (L) 10 - 20 mmol/L    Urea Nitrogen 21 6 - 23 mg/dL    Creatinine 0.65 0.50 - 1.05 mg/dL    eGFR >90 >60 mL/min/1.73m*2    Calcium 7.9 (L) 8.6 - 10.3 mg/dL    Albumin 3.1 (L) 3.4 - 5.0 g/dL    Alkaline Phosphatase 38 33 - 136 U/L    Total Protein 5.2 (L) 6.4 - 8.2 g/dL    AST 11 9 - 39 U/L    Bilirubin, Total 0.4 0.0 - 1.2 mg/dL    ALT 21 7 - 45 U/L   Magnesium   Result Value Ref Range    Magnesium 1.84 1.60 - 2.40 mg/dL   Phosphorus   Result Value Ref Range    Phosphorus 3.5 2.5 - 4.9 mg/dL   POCT GLUCOSE   Result Value Ref Range    POCT Glucose 150 (H) 74 - 99 mg/dL   POCT GLUCOSE   Result Value Ref Range    POCT Glucose 203 (H) 74 - 99 mg/dL        Scheduled medications  Scheduled Medications[1]  Continuous medications  Continuous Medications[2]  PRN medications  PRN Medications[3]       Assessment/Plan   Assessment & Plan  Acute on chronic respiratory failure with hypoxia  On 5L, 4-6L is baseline.  Cont incentive spirometry and acapella. Acute hypoxic episode today likely 2/2 aspiration.  Recommend speech eval at this time.  Pt already had concerns and was to have a swallow study next month as outpatient.  Hypersensitivity pneumonitis (Multi)  Cont prednisone 40mg daily.  Recommend 1 month taper on discharge.  Resume ofev on discharge.  Advised pt to discuss w/ her pulmonlogist resuming cellcept.         Bowen Royal MD       [1] aspirin, 81 mg, oral, Daily  atorvastatin, 40 mg, oral, Daily  benzonatate, 200 mg, oral, TID  budesonide, 0.5 mg, nebulization, BID   And  ipratropium-albuteroL, 3 mL, nebulization,  TID  enoxaparin, 40 mg, subcutaneous, q24h  fluticasone, 1 spray, Each Nostril, Daily  furosemide, 40 mg, intravenous, q12h  [Held by provider] furosemide, 20 mg, oral, Daily  insulin lispro, 0-10 Units, subcutaneous, TID AC  [Held by provider] mycophenolate, 1,000 mg, oral, BID  pantoprazole, 40 mg, oral, Daily before breakfast  predniSONE, 40 mg, oral, Nightly     [2]    [3] PRN medications: acetaminophen, dextrose, dextrose, glucagon, glucagon, hydrocodone-homatropine, ipratropium-albuteroL, melatonin, oxygen, sennosides-docusate sodium

## 2025-05-12 ENCOUNTER — DOCUMENTATION (OUTPATIENT)
Dept: HOME HEALTH SERVICES | Facility: HOME HEALTH | Age: 70
End: 2025-05-12
Payer: COMMERCIAL

## 2025-05-12 ENCOUNTER — PHARMACY VISIT (OUTPATIENT)
Dept: PHARMACY | Facility: CLINIC | Age: 70
End: 2025-05-12
Payer: MEDICAID

## 2025-05-12 ENCOUNTER — APPOINTMENT (OUTPATIENT)
Dept: RADIOLOGY | Facility: HOSPITAL | Age: 70
DRG: 189 | End: 2025-05-12
Payer: MEDICARE

## 2025-05-12 VITALS
HEART RATE: 85 BPM | SYSTOLIC BLOOD PRESSURE: 119 MMHG | OXYGEN SATURATION: 94 % | TEMPERATURE: 97.2 F | HEIGHT: 65 IN | BODY MASS INDEX: 25.49 KG/M2 | RESPIRATION RATE: 16 BRPM | DIASTOLIC BLOOD PRESSURE: 84 MMHG | WEIGHT: 153 LBS

## 2025-05-12 LAB
ALBUMIN SERPL BCP-MCNC: 3.3 G/DL (ref 3.4–5)
ALP SERPL-CCNC: 39 U/L (ref 33–136)
ALT SERPL W P-5'-P-CCNC: 21 U/L (ref 7–45)
ANION GAP SERPL CALC-SCNC: 10 MMOL/L (ref 10–20)
AST SERPL W P-5'-P-CCNC: 10 U/L (ref 9–39)
BACTERIA BLD CULT: NORMAL
BACTERIA BLD CULT: NORMAL
BILIRUB SERPL-MCNC: 0.5 MG/DL (ref 0–1.2)
BUN SERPL-MCNC: 20 MG/DL (ref 6–23)
CALCIUM SERPL-MCNC: 8.3 MG/DL (ref 8.6–10.3)
CHLORIDE SERPL-SCNC: 93 MMOL/L (ref 98–107)
CO2 SERPL-SCNC: 40 MMOL/L (ref 21–32)
CREAT SERPL-MCNC: 0.78 MG/DL (ref 0.5–1.05)
EGFRCR SERPLBLD CKD-EPI 2021: 83 ML/MIN/1.73M*2
ERYTHROCYTE [DISTWIDTH] IN BLOOD BY AUTOMATED COUNT: 13.5 % (ref 11.5–14.5)
GLUCOSE BLD MANUAL STRIP-MCNC: 156 MG/DL (ref 74–99)
GLUCOSE BLD MANUAL STRIP-MCNC: 164 MG/DL (ref 74–99)
GLUCOSE SERPL-MCNC: 164 MG/DL (ref 74–99)
HCT VFR BLD AUTO: 45.2 % (ref 36–46)
HGB BLD-MCNC: 14.3 G/DL (ref 12–16)
MAGNESIUM SERPL-MCNC: 1.96 MG/DL (ref 1.6–2.4)
MCH RBC QN AUTO: 29.3 PG (ref 26–34)
MCHC RBC AUTO-ENTMCNC: 31.6 G/DL (ref 32–36)
MCV RBC AUTO: 93 FL (ref 80–100)
NRBC BLD-RTO: 0 /100 WBCS (ref 0–0)
PHOSPHATE SERPL-MCNC: 3.5 MG/DL (ref 2.5–4.9)
PLATELET # BLD AUTO: 252 X10*3/UL (ref 150–450)
POTASSIUM SERPL-SCNC: 4.9 MMOL/L (ref 3.5–5.3)
PROT SERPL-MCNC: 5.7 G/DL (ref 6.4–8.2)
RBC # BLD AUTO: 4.88 X10*6/UL (ref 4–5.2)
SODIUM SERPL-SCNC: 138 MMOL/L (ref 136–145)
WBC # BLD AUTO: 7.2 X10*3/UL (ref 4.4–11.3)

## 2025-05-12 PROCEDURE — 99239 HOSP IP/OBS DSCHRG MGMT >30: CPT | Performed by: INTERNAL MEDICINE

## 2025-05-12 PROCEDURE — 2500000005 HC RX 250 GENERAL PHARMACY W/O HCPCS: Performed by: INTERNAL MEDICINE

## 2025-05-12 PROCEDURE — 82947 ASSAY GLUCOSE BLOOD QUANT: CPT

## 2025-05-12 PROCEDURE — 2500000001 HC RX 250 WO HCPCS SELF ADMINISTERED DRUGS (ALT 637 FOR MEDICARE OP)

## 2025-05-12 PROCEDURE — 84100 ASSAY OF PHOSPHORUS: CPT

## 2025-05-12 PROCEDURE — 80053 COMPREHEN METABOLIC PANEL: CPT

## 2025-05-12 PROCEDURE — 83735 ASSAY OF MAGNESIUM: CPT

## 2025-05-12 PROCEDURE — 99233 SBSQ HOSP IP/OBS HIGH 50: CPT | Performed by: INTERNAL MEDICINE

## 2025-05-12 PROCEDURE — RXMED WILLOW AMBULATORY MEDICATION CHARGE

## 2025-05-12 PROCEDURE — 85027 COMPLETE CBC AUTOMATED: CPT

## 2025-05-12 PROCEDURE — 2500000001 HC RX 250 WO HCPCS SELF ADMINISTERED DRUGS (ALT 637 FOR MEDICARE OP): Performed by: INTERNAL MEDICINE

## 2025-05-12 PROCEDURE — 74230 X-RAY XM SWLNG FUNCJ C+: CPT | Performed by: RADIOLOGY

## 2025-05-12 PROCEDURE — 36415 COLL VENOUS BLD VENIPUNCTURE: CPT

## 2025-05-12 PROCEDURE — 74230 X-RAY XM SWLNG FUNCJ C+: CPT

## 2025-05-12 PROCEDURE — 2500000002 HC RX 250 W HCPCS SELF ADMINISTERED DRUGS (ALT 637 FOR MEDICARE OP, ALT 636 FOR OP/ED)

## 2025-05-12 PROCEDURE — 94760 N-INVAS EAR/PLS OXIMETRY 1: CPT

## 2025-05-12 PROCEDURE — 94640 AIRWAY INHALATION TREATMENT: CPT

## 2025-05-12 PROCEDURE — 92611 MOTION FLUOROSCOPY/SWALLOW: CPT | Mod: GN | Performed by: PHARMACIST

## 2025-05-12 RX ORDER — BENZONATATE 200 MG/1
200 CAPSULE ORAL 3 TIMES DAILY
Qty: 90 CAPSULE | Refills: 1 | Status: SHIPPED | OUTPATIENT
Start: 2025-05-12

## 2025-05-12 RX ORDER — PREDNISONE 10 MG/1
TABLET ORAL
Qty: 53 TABLET | Refills: 0 | Status: SHIPPED | OUTPATIENT
Start: 2025-05-12 | End: 2025-06-09

## 2025-05-12 RX ORDER — TORSEMIDE 20 MG/1
20 TABLET ORAL DAILY
Qty: 90 TABLET | Refills: 1 | Status: SHIPPED | OUTPATIENT
Start: 2025-05-12

## 2025-05-12 RX ORDER — TORSEMIDE 20 MG/1
20 TABLET ORAL DAILY
Status: DISCONTINUED | OUTPATIENT
Start: 2025-05-12 | End: 2025-05-12 | Stop reason: HOSPADM

## 2025-05-12 RX ADMIN — TORSEMIDE 20 MG: 20 TABLET ORAL at 09:08

## 2025-05-12 RX ADMIN — BARIUM SULFATE 15 ML: 400 PASTE ORAL at 12:26

## 2025-05-12 RX ADMIN — BUDESONIDE 0.5 MG: 0.5 INHALANT ORAL at 08:26

## 2025-05-12 RX ADMIN — ATORVASTATIN CALCIUM 40 MG: 40 TABLET, FILM COATED ORAL at 09:06

## 2025-05-12 RX ADMIN — FLUTICASONE PROPIONATE 1 SPRAY: 50 SPRAY, METERED NASAL at 09:06

## 2025-05-12 RX ADMIN — PANTOPRAZOLE SODIUM 40 MG: 40 TABLET, DELAYED RELEASE ORAL at 05:59

## 2025-05-12 RX ADMIN — IPRATROPIUM BROMIDE AND ALBUTEROL SULFATE 3 ML: 2.5; .5 SOLUTION RESPIRATORY (INHALATION) at 14:48

## 2025-05-12 RX ADMIN — ASPIRIN 81 MG: 81 TABLET, CHEWABLE ORAL at 09:06

## 2025-05-12 RX ADMIN — BENZONATATE 200 MG: 100 CAPSULE ORAL at 09:06

## 2025-05-12 RX ADMIN — Medication 6 L/MIN: at 08:26

## 2025-05-12 RX ADMIN — IPRATROPIUM BROMIDE AND ALBUTEROL SULFATE 3 ML: 2.5; .5 SOLUTION RESPIRATORY (INHALATION) at 08:26

## 2025-05-12 RX ADMIN — Medication 4 L/MIN: at 14:48

## 2025-05-12 RX ADMIN — BENZONATATE 200 MG: 100 CAPSULE ORAL at 14:53

## 2025-05-12 RX ADMIN — BARIUM SULFATE 25 ML: 400 SUSPENSION ORAL at 12:26

## 2025-05-12 RX ADMIN — BARIUM SULFATE 90 ML: 0.81 POWDER, FOR SUSPENSION ORAL at 12:26

## 2025-05-12 RX ADMIN — BARIUM SULFATE 700 MG: 700 TABLET ORAL at 12:26

## 2025-05-12 ASSESSMENT — COGNITIVE AND FUNCTIONAL STATUS - GENERAL
CLIMB 3 TO 5 STEPS WITH RAILING: A LITTLE
MOBILITY SCORE: 22
DAILY ACTIVITIY SCORE: 24
WALKING IN HOSPITAL ROOM: A LITTLE

## 2025-05-12 ASSESSMENT — PAIN - FUNCTIONAL ASSESSMENT
PAIN_FUNCTIONAL_ASSESSMENT: 0-10
PAIN_FUNCTIONAL_ASSESSMENT: 0-10

## 2025-05-12 ASSESSMENT — PAIN SCALES - GENERAL
PAINLEVEL_OUTOF10: 0 - NO PAIN

## 2025-05-12 NOTE — DISCHARGE SUMMARY
"[facility] Hospitalist Discharge Summary and Transition Note           Airam Tyson                  :  1955                     MRN:  [id number]     ADMIT DATE:  2025            DISCHARGE DATE:  2025    LOS: 5  day(s) since admission    PRIMARY CARE PHYSICIAN:  RE Melo     VISIT STATUS: Admission     CODE STATUS:  Full Code     DISCHARGE DIAGNOSES:    Assessment & Plan  Acute on chronic respiratory failure with hypoxia    Shortness of breath    Hypersensitivity pneumonitis (Multi)       HOSPITAL COURSE:  Airam Tyson is a 69 y.o. female with a PMH of HTN, HLD, T2DM, GERD, COPD, DORA, ILD (on mycophenolate, off Nintedanib 2/ recurrent ophthalmic herpes zoster ), CRF on 4L home O2, DVT ().   Lung biopsy from  was significant for hypersensitivity pneumonitis after which she was recommended to avoid birds.     Patient presented to [facility] with c/o worsening shortness of breath associated with hypoxia, chest tightness, palpitations, dizziness, lightheadedness, and productive cough with yellow sputum over the past 2 weeks.  Per patient, she went to her PCP and was noted to be hypoxic, saturating 70s to 80s while on 4 L via nasal cannula.  Subsequently she was rushed to the ED for further evaluation treatment.  In the ED, patient was placed on 8 L via nasal cannula.  CT chest was significant for \"interstitial pneumonia form of pulmonary fibrosis\" and she was started on Rocephin/Azithromycin with a dose of Lasix due to elevated BNP.  Patient denies other associated symptoms such as fever, nausea, vomiting, diarrhea, chest pain, abdominal pain.  Patient does not have bird pets and has only 1 cat.  Lives with her daughter.     Patient was admitted for acute on chronic respiratory failure due to hypersensitivity pneumonitis/ILD flare.  While inpatient patient was noted to be aspirating on fluids-MBS was ordered which revealed oropharyngeal dysphagia.  Patient was " discharged home once medically optimized with an instruction to resume home Ofev and to contact primary pulmonologist regarding resuming CellCept (which has been on hold due to suspected ophthalmic shingles and referral to [facility] (RN, PT, SLP).    #ACRF w/ hypoxia on 4L home  #ILD flare  #Hypersensitivity pneumonitis  #AECOPD  #Severe pulmonary hypertension-TTE (5/8/25): EF (55-60%), positive bubble study, elevated RSVP (60mmHg), severely enlarged R ventricle  #Oropharyngeal dysphagia 2/2 aspiration of liquid c/b hypoxia requiring NRB (5/11/25) briefly  #Non-IDDM II  #Chronic B/L LE edema  #Chronic HFpEF w/o decompensation  #DORA on CPAP  #Hx DVT currently not on OAC  -off azithromycin/ceftriaxone, low suspicion for PNA  -On steroid (transition to 1 month taper at discharge per pulm recs), bronchodilators, pulmonary toileting   -s/p IV lasix and later transitioned torsemide in favor of home PO lasix due to better bioavailability     PROCEDURES:  none  CONSULTANTS:  pulm, SLP    COMPLEXITY OF FOLLOW UP:  [] Moderate Complexity: follow up within 7-14 calendar days (38269)  [x]Severe Complexity: follow up within 7 calendar days (20767)     FOLLOW UP TESTING, PENDING RESULTS ORREFERRALS AT TRANSITIONAL CARE VISIT:   [x]Yes  contact primary pulmonologist regarding resuming CellCept (which has been on hold due to suspected ophthalmic shingles and referral to [facility] (RN, PT, SLP).   [] No     DISCHARGE MEDICATIONS:        Significant Medication Changes:         Your medication list        START taking these medications        Instructions Last Dose Given Next Dose Due   benzonatate 200 mg capsule  Commonly known as: Tessalon      Take 1 capsule (200 mg) by mouth 3 times a day. Do not crush or chew.       dextromethorphan-guaifenesin  mg 12 hr tablet  Commonly known as: Mucinex DM      Take 1 tablet by mouth 2 times a day as needed for cough. Do not crush, chew, or split.       predniSONE 10 mg tablet  Commonly known as:  Deltasone  Start taking on: May 12, 2025      Take 4 tablets (40 mg) by mouth once daily for 7 days, THEN 2 tablets (20 mg) once daily for 7 days, THEN 1 tablet (10 mg) once daily for 7 days, THEN 0.5 tablets (5 mg) once daily for 7 days.       torsemide 20 mg tablet  Commonly known as: Demadex      Take 1 tablet (20 mg) by mouth once daily.              CONTINUE taking these medications        Instructions Last Dose Given Next Dose Due   acetaminophen 500 mg tablet  Commonly known as: Tylenol           albuterol 2.5 mg /3 mL (0.083 %) nebulizer solution      Take 3 mL (2.5 mg) by nebulization every 6 hours if needed for wheezing or shortness of breath.       albuterol 90 mcg/actuation inhaler      INHALE 1 PUFF AS NEEDED EVERY 4 HOURS       aspirin 81 mg chewable tablet           atorvastatin 40 mg tablet  Commonly known as: Lipitor      TAKE 1 TABLET BY MOUTH EVERY DAY       fluticasone 50 mcg/actuation nasal spray  Commonly known as: Flonase      ADMINISTER 1 SPRAY INTO EACH NOSTRIL ONCE DAILY.       ipratropium 0.02 % nebulizer solution  Commonly known as: Atrovent           metFORMIN  mg 24 hr tablet  Commonly known as: Glucophage-XR      TAKE 1 TABLET BY MOUTH TWICE A DAY       mycophenolate 500 mg tablet  Commonly known as: Cellcept           nintedanib 100 mg capsule capsule  Commonly known as: Ofev           omeprazole 20 mg DR capsule  Commonly known as: PriLOSEC      TAKE 1 CAPSULE BY MOUTH EVERY DAY       OneTouch Verio test strips  Generic drug: blood sugar diagnostic      USE TWICE A DAY AS DIRECTED       Trelegy Ellipta 200-62.5-25 mcg blister with device  Generic drug: fluticasone-umeclidin-vilanter      Inhale 1 puff once daily.              STOP taking these medications      furosemide 20 mg tablet  Commonly known as: Lasix                  Where to Get Your Medications        These medications were sent to [facility]/pharmacy #[id number] - [place], [place] - [address] [facility]   [address], [facility] [address]      Phone: [phone]   metFORMIN  mg 24 hr tablet       These medications were sent to [facility]  [address], [facility] [address]      Hours: 9 AM to 5 PM Mon-Fri Phone: [phone]   benzonatate 200 mg capsule  dextromethorphan-guaifenesin  mg 12 hr tablet  predniSONE 10 mg tablet  torsemide 20 mg tablet           DIET: regular  - Regular solids (IDDSI Level 7)  - Thin liquids (IDDSI Level 0) SMALL SINGLE SIPS  - Meds whole, one at a time, in thin liquid vs puree, as best tolerated     STRATEGIES:  - Upright positioning for all PO intake  - Remain upright for >30 min after meals  - Slow rate of intake  - Small bites  - Small/SINGLE sips  - Alternate bites and sips    DISPOSITION:  Home with [facility]     Follow up with RE Melo .        DISCHARGE TIME: > 30 minutes     Electronically signed by Denver Brunson DO on 05/12/25 at 2:10 PM      Dictated using Dragon Naturally Speaking Medical Version 2.4  Proof read however unrecognized voice recognition errors may have occurred

## 2025-05-12 NOTE — PROGRESS NOTES
05/12/25 1143   Discharge Planning   Living Arrangements Children   Support Systems Children   Assistance Needed A&0x3, independent with ADLs, ambulates with rollator, drives, on 4L 02 baseline thru medical services, no cpap or bipap, not active with any HHC, PCP is Yumiko Childress.   Type of Residence Private residence   Number of Stairs to Enter Residence 13   Number of Stairs Within Residence 1   Do you have animals or pets at home? Yes   Type of Animals or Pets 1 cat, 1 dog   Who is requesting discharge planning? Provider   Expected Discharge Disposition Home H  (Home with daughter and new [facility] for SN, PT, OT. Referral sent. Patient on 7L 02 here and 4L baseline)   Does the patient need discharge transport arranged? No   Patient Choice   Provider Choice list and CMS website (https://medicare.gov/care-compare#search) for post-acute Quality and Resource Measure Data were provided and reviewed with: Patient   Stroke Family Assessment   Stroke Family Assessment Needed No   Intensity of Service   Intensity of Service 0-30 min     5/12/25 @ 1600: Now on baseline 02 4L , medically cleared for discharge today, [facility] processed for SOC 24-48 hrs.

## 2025-05-12 NOTE — PROGRESS NOTES
Department of Medicine  Division of Pulmonary, Critical Care, and Sleep Medicine    Airam Tyson is a 69 y.o. female on day 5 of admission presenting with Acute on chronic respiratory failure with hypoxia.    Subjective   Stable, no events overnight, on 7 L nasal cannula       Objective     Vital Signs      5/11/2025     4:52 AM 5/11/2025     7:32 AM 5/11/2025     3:51 PM 5/11/2025     7:46 PM 5/11/2025     8:21 PM 5/12/2025     5:00 AM 5/12/2025     8:11 AM   Vitals   Systolic  120 93 100 102 99 119   Diastolic  80 56 55 68 65 84   BP Location  Right arm Right arm Right arm Right arm Right arm Right arm   Heart Rate  89 98 105  83 85   Temp  36.5 °C (97.7 °F) 36.6 °C (97.9 °F) 36.7 °C (98.1 °F)  36.5 °C (97.7 °F) 36.2 °C (97.2 °F)   Resp  18 17 16  16 16   Weight (lb) 153         BMI 25.49 kg/m2         BSA (m2) 1.78 m2              Physical exam  Constitutional: Normal appearance.  HEENT: Normocephalic and atraumatic.  Cardiovascular: Normal rate and regular rhythm.  Pulmonary: Normal respiratory effort, coarse breath sounds bilaterally, no wheezing.  Musculoskeletal: No edema, no cyanosis.  Neurological: Awake, alert and oriented x3.  Psychiatric: Normal behavior, mood and affect.    Labs:  Lab Results   Component Value Date    WBC 7.2 05/12/2025    HGB 14.3 05/12/2025    HCT 45.2 05/12/2025    MCV 93 05/12/2025     05/12/2025      Lab Results   Component Value Date    GLUCOSE 164 (H) 05/12/2025    CALCIUM 8.3 (L) 05/12/2025     05/12/2025    K 4.9 05/12/2025    CO2 40 (HH) 05/12/2025    CL 93 (L) 05/12/2025    BUN 20 05/12/2025    CREATININE 0.78 05/12/2025      Lab Results   Component Value Date    ALT 21 05/12/2025    AST 10 05/12/2025    ALKPHOS 39 05/12/2025    BILITOT 0.5 05/12/2025        Oxygen Therapy  SpO2: 92 %  Medical Gas Therapy: Supplemental oxygen  Medical Gas Delivery Method: (S) High flow nasal cannula (pt desat to SpO2 84% on 4 L NC, titrated to 6 L HFNC,SpO2 now 92%.)        Intake/Output last 3 Shifts:  I/O last 3 completed shifts:  In: 1311 (18.9 mL/kg) [P.O.:1311]  Out: 4950 (71.3 mL/kg) [Urine:4950 (2 mL/kg/hr)]  Weight: 69.4 kg       Medications   Scheduled medications  Scheduled Medications[1]  Continuous medications  Continuous Medications[2]  PRN medications  PRN Medications[3]     Allergies  Egg, House dust, Spinach, [place], Fluticasone, Levofloxacin, Salmeterol, and Sulfa (sulfonamide antibiotics)    Chest Radiograph   CT angio chest for pulmonary embolism 05/07/2025    Narrative  Interpreted By:  Neo Beltran,  STUDY:  CT ANGIO CHEST FOR PULMONARY EMBOLISM;  5/7/2025 5:13 pm    INDICATION:  Signs/Symptoms:very sob.      COMPARISON:  11/02/2022    ACCESSION NUMBER(S):  [id number]    ORDERING CLINICIAN:  LAKE GARCIA    TECHNIQUE:  Contiguous axial images of the chest were obtained after the  intravenous administration of iodinated contrast using angiographic  PE protocol. Coronal and sagittal reformatted images were  reconstructed from the axial data. MIP images were created on an  independent workstation and reviewed.    FINDINGS:    MEDIASTINUM AND LYMPH NODES:  The esophageal wall appears within  normal limits.  There is diffuse intrathoracic lymphadenopathy,  primarily bilateral hilar, slightly progressed. No enlarged axillary  lymph nodes. No pneumomediastinum.    VESSELS:  Normal caliber thoracic aorta without dissection. Minimal  aortic atherosclerosis. The pulmonary artery is enlarged, measuring  up to 3.6 cm, indicative of pulmonary hypertension. Tortuous distal  pulmonary arteries are also in keeping with pulmonary hypertension.  No acute pulmonary embolism.    HEART: Moderate cardiomegaly disproportionate right atrial and  ventricular dilatation. There is straightening and leftward bowing of  the interventricular septum. Mild coronary artery calcifications. No  pericardial effusion.    LUNG, AIRWAYS, PLEURA: There is worsening of pulmonary  fibrosis.  There is traction bronchiectasis, bronchiolectasis, honeycombing, and  reticulation with minimal ground-glass component. There is a  heterogeneous distribution with areas of normal lung, reticulation,  and honeycombing. The worst amount of fibrosis is seen within the  left upper lobe and left lower lobe. No focal consolidation or  pleural effusion. No pneumothorax.    OSSEOUS STRUCTURES: No acute osseous abnormality.    CHEST WALL SOFT TISSUES: Body wall edema.    UPPER ABDOMEN/OTHER: Simple hepatic cyst. Nonobstructing  subcentimeter right renal calculus. Status post cholecystectomy.    Impression  1. Worsening of usual interstitial pneumonia form of pulmonary  fibrosis as described above. Pulmonology follow-up recommended.    2. Diffuse intrathoracic lymphadenopathy, progressed, possibly  reactive on the basis of the aforementioned.    3. Evidence of severe pulmonary hypertension and right heart  dysfunction with dilated main pulmonary artery, tortuous distal  pulmonary arteries, and dilated right heart chambers with marked  displacement of the interventricular septum toward the left  ventricle. Degree of main pulmonary artery dilatation is progressed  from prior study as is the degree of right heart chamber dilatation.    4. No acute pulmonary embolism.    MACRO:  None.    Signed by: Neo Beltran 5/7/2025 5:47 PM  Dictation workstation:   [id number]       XR chest 1 view 05/11/2025    Narrative  Interpreted By:  Alfredo Sosa,  STUDY:  Chest dated  5/11/2025.    INDICATION:  Signs/Symptoms:respiratory distress    COMPARISON:  Chest dated 05/07/2025; chest CT dated 05/07/2025.    ACCESSION NUMBER(S):  [id number]    ORDERING CLINICIAN:  WENDY WISDOM    TECHNIQUE:  One view of the chest.    FINDINGS:  Diffuse reticular opacities are seen in the left lung and in the  right upper lung zone. Reticular opacities are seen to a lesser  degree in the right mid to lower lung zone.  No pneumothorax  or  effusion is evident. The cardiomediastinal silhouette is  prominent  but similar to the prior exam.Degenerative change is seen of the  spine and shoulders.    Impression  Findings compatible with interstitial lung disease. Superimposed  pneumonitis would be difficult to entirely exclude.    MACRO:  None    Signed by: Alfredo Sosa 5/11/2025 11:31 AM  Dictation workstation:   [id number]      XR chest 1 view 05/07/2025    Narrative  Interpreted By:  Bimal Sim,  STUDY:  XR CHEST 1 VIEW 5/7/2025 3:03 pm    INDICATION:  Signs/Symptoms:sob    COMPARISON:  01/18/2022    ACCESSION NUMBER(S):  [id number]    ORDERING CLINICIAN:  LAKE GARCIA    TECHNIQUE:  Single AP view chest    FINDINGS:  Cardiac silhouette is upper limits of normal. Examination is rotated  accentuating the cardiac silhouette. Coarsening of the interstitial  markings in bilateral lung fields in particular of the left lung.  There is a wedge resection with suture identified in the mid left  lung. Superimposed patchy infiltrate in the area of coarsened  interstitial markings in the left lung as well as right lung apex not  excluded. No dense airspace consolidation.    Impression  1. Coarsening interstitial markings within bilateral lung fields in  particular left lung component which is likely chronic in relation to  interstitial lung disease.. However, superimposed component of  infiltrate in particular in the left lung and right lung apex not  excluded.    Signed by: Bimal Sim 5/7/2025 3:14 PM  Dictation workstation:   [id number]         Assessment and Plan / Recommendations   Assessment/Plan   69-year-old woman with history of chronic hypoxic respiratory failure due to pulmonary fibrosis/chronic HP on CellCept and Ofev admitted with acute on chronic respiratory failure    1.  Acute on chronic hypoxic respiratory failure due to fibrosis/HP  2.  Pulmonary fibrosis/chronic HP    On 7 L nasal cannula today (baseline 4-6  L)    -Continue prednisone 40 mg, plan for slow taper over a month  - Aspiration precautions, swallow eval pending  - Resume Ofev on discharge  - Incentive spirometry, Acapella, wean O2 as tolerated  - Follow-up with her pulmonologist to resume CellCept (has been on hold for possible shingles)    Isiah Cai MD           [1] aspirin, 81 mg, oral, Daily  atorvastatin, 40 mg, oral, Daily  benzonatate, 200 mg, oral, TID  budesonide, 0.5 mg, nebulization, BID   And  ipratropium-albuteroL, 3 mL, nebulization, TID  enoxaparin, 40 mg, subcutaneous, q24h  fluticasone, 1 spray, Each Nostril, Daily  insulin lispro, 0-10 Units, subcutaneous, TID AC  [Held by provider] mycophenolate, 1,000 mg, oral, BID  pantoprazole, 40 mg, oral, Daily before breakfast  predniSONE, 40 mg, oral, Nightly  torsemide, 20 mg, oral, Daily  [2]    [3] PRN medications: acetaminophen, dextrose, dextrose, glucagon, glucagon, hydrocodone-homatropine, ipratropium-albuteroL, melatonin, oxygen, oxygen, sennosides-docusate sodium

## 2025-05-12 NOTE — HH CARE COORDINATION
Home Care received a Referral for Nursing, Physical Therapy, and Occupational Therapy. We have processed the referral for a Start of Care on 05/13-05/14.     If you have any questions or concerns, please feel free to contact us at [phone]. Follow the prompts, enter your five digit zip code, and you will be directed to your care team on E[facility] 2.

## 2025-05-12 NOTE — PROGRESS NOTES
Physical Therapy                 Therapy Communication Note    Patient Name: Airam Tyson  MRN: [id number]  Department: [address]  Room: [address] [place] [address]  Today's Date: 5/12/2025     Discipline: Physical Therapy    PT Missed Visit: Yes     Missed Visit Reason: Missed Visit Reason: Patient in a medical procedure (Pt. off floor for barium swallow test.  Not available for PT tx session at this time.  RN made aware.)    Missed Time: Attempt  1210    Comment:

## 2025-05-12 NOTE — PROGRESS NOTES
"[facility] Hospitalist Progress Note       [phone]: Please page me for patient care issues.  [phone]: Please page night hospitalist for any issues.     Airam Tysno  :  1955(69 y.o.)  MRN:  [id number]  PCP: Yumiko Childress, ABIMAEL-CNP  LOS: 5  day(s) since admission    Assessment & Plan  Acute on chronic respiratory failure with hypoxia    Shortness of breath    Hypersensitivity pneumonitis (Multi)      Assessment and Plan:     Airam Tyson is a 69 y.o. female with a PMH of HTN, HLD, T2DM, GERD, COPD, DORA, ILD (on mycophenolate, off Nintedanib 2/2 recurrent ophthalmic herpes zoster ), CRF on 4L home O2, DVT ().   Lung biopsy from  was significant for hypersensitivity pneumonitis after which she was recommended to avoid birds.    Patient presented to [facility] with c/o worsening shortness of breath associated with hypoxia, chest tightness, palpitations, dizziness, lightheadedness, and productive cough with yellow sputum over the past 2 weeks.  Per patient, she went to her PCP and was noted to be hypoxic, saturating 70s to 80s while on 4 L via nasal cannula.  Subsequently she was rushed to the ED for further evaluation treatment.  In the ED, patient was placed on 8 L via nasal cannula.  CT chest was significant for \"interstitial pneumonia form of pulmonary fibrosis\" and she was started on Rocephin/Azithromycin with a dose of Lasix due to elevated BNP.  Patient denies other associated symptoms such as fever, nausea, vomiting, diarrhea, chest pain, abdominal pain.  Patient does not on any bird pets and has only 1 cat.  Lives with her daughter.    #ACRF w/ hypoxia on 4L home  #ILD flare  #Hypersensitivity pneumonitis  #AECOPD  #Severe pulmonary hypertension  #Suspected Dysphagia 2/2 aspiration of liquid c/b hypoxia requiring NRB (25) briefly  #Non-IDDM II  #Chronic B/L LE edema  #Chronic HFpEF w/o decompensation  #DORA on CPAP  #Hx DVT currently not on OAC  -off azithromycin/ceftriaxone, " low suspicion for PNA  -On steroid (transition to 1 month taper at discharge per pulm recs), bronchodilators, pulmonary toileting   -initiate IV lasix and hold home PO lasix w/ plan to transition to torsemide at discharge due to better bioavailability  -check MBS  -Wean down to home O2 as able  -SSI while inpatient  -Resume rest of home meds as indicated  -TTE: EF (55-60%), positive bubble study, elevated RSVP (60mmHg), severely enlarged R ventricle  -CS notes reviewed and recs appreciated: pulm, SLP    Disposition: anticipate discharge 5/12 pending MBS    DVT Prophylaxis: Subq Lovenox    Code status: Full Code  Diet: Adult diet Consistent Carb, 2-3 grams sodium; CCD 75 gm/meal; 1500 mL fluid    Level of MDM:  High    discussed with nurse, discussed with TCC/SW, I personally examined the patient, and I personally reviewed chart, data, labs radiology reports    Family Communication  Number :   Name of Designated Family Representative:       Total time spent: 35 minutes, of total time providing counseling or in coordination of care. Total time on this day of visit includes record and documentation review before and after visit including documentation and time not explicitly included on EMR time stamp      Subjective:   Interval History:  HPI  The patient complains of dyspnea  The patient feels their symptoms areimproving  no events or new concerns    Scheduled Meds:Scheduled Medications[1]  Continuous Infusions:Continuous Medications[2]  PRN Meds:PRN Medications[3]    Review of Systems   All other systems reviewed and are negative.    Interval Pertinent History:  Social History     Tobacco Use    Smoking status: Never    Smokeless tobacco: Never   Substance Use Topics    Alcohol use: Never         Objective:   Patient Vitals for the past 24 hrs:   BP Temp Temp src Pulse Resp SpO2   05/12/25 0811 119/84 36.2 °C (97.2 °F) Temporal 85 16 100 %   05/12/25 0500 99/65 36.5 °C (97.7 °F) Temporal 83 16 99 %   05/11/25 2021  102/68 -- -- -- -- --   25 -- -- -- -- -- 98 %   25 1946 100/55 36.7 °C (98.1 °F) Temporal 105 16 98 %   25 1551 93/56 36.6 °C (97.9 °F) Temporal 98 17 97 %   25 1253 -- -- -- -- -- 98 %       Average, Min, and Max for last 24 hours Vitals:  TEMPERATURE:  Temp  Av.5 °C (97.7 °F)  Min: 36.2 °C (97.2 °F)  Max: 36.7 °C (98.1 °F)    RESPIRATIONS RANGE: Resp  Av.3  Min: 16  Max: 17    PULSE RANGE: Pulse  Av.8  Min: 83  Max: 105    BLOOD PRESSURE RANGE:  Systolic (24hrs), Av , Min:93 , Max:119   ; Diastolic (24hrs), Av, Min:55, Max:84      PULSE OXIMETRY RANGE: SpO2  Av.3 %  Min: 97 %  Max: 100 %  Body mass index is 25.49 kg/m².    I/O last 3 completed shifts:  In: 1311 (18.9 mL/kg) [P.O.:1311]  Out: 4950 (71.3 mL/kg) [Urine:4950 (2 mL/kg/hr)]  Weight: 69.4 kg   Weight change:      Physical Exam  Vitals and nursing note reviewed.   Constitutional:       Appearance: Normal appearance.   HENT:      Head: Normocephalic and atraumatic.      Right Ear: External ear normal.      Left Ear: External ear normal.      Nose: Nose normal.      Mouth/Throat:      Mouth: Mucous membranes are moist.   Eyes:      General: No scleral icterus.        Right eye: No discharge.         Left eye: No discharge.      Extraocular Movements: Extraocular movements intact.      Conjunctiva/sclera: Conjunctivae normal.      Pupils: Pupils are equal, round, and reactive to light.   Cardiovascular:      Rate and Rhythm: Normal rate and regular rhythm.   Pulmonary:      Effort: Respiratory distress present.      Breath sounds: Wheezing (mild) and rales present.   Abdominal:      General: Abdomen is flat. Bowel sounds are normal.      Palpations: Abdomen is soft.   Musculoskeletal:         General: Normal range of motion.      Right lower leg: Edema (+2 w/ sig improvement) present.      Left lower leg: Edema (+2 w/ sig improvement) present.   Skin:     General: Skin is warm and dry.      Capillary  Refill: Capillary refill takes less than 2 seconds.   Neurological:      General: No focal deficit present.   Psychiatric:         Mood and Affect: Mood normal.         Thought Content: Thought content normal.         Judgment: Judgment normal.         Lab Results   Component Value Date     05/12/2025    K 4.9 05/12/2025    CL 93 (L) 05/12/2025    CO2 40 (HH) 05/12/2025    BUN 20 05/12/2025    CREATININE 0.78 05/12/2025    GLUCOSE 164 (H) 05/12/2025    CALCIUM 8.3 (L) 05/12/2025    PROT 5.7 (L) 05/12/2025    BILITOT 0.5 05/12/2025    ALKPHOS 39 05/12/2025    AST 10 05/12/2025    ALT 21 05/12/2025    GLOB 3.5 03/10/2023       Lab Results   Component Value Date    WBC 7.2 05/12/2025    HGB 14.3 05/12/2025    HCT 45.2 05/12/2025    MCV 93 05/12/2025     05/12/2025    LYMPHOPCT 9.5 05/07/2025    RBC 4.88 05/12/2025    MCH 29.3 05/12/2025    MCHC 31.6 (L) 05/12/2025    RDW 13.5 05/12/2025    CRP 1.01 (A) 01/15/2022       Lab Results   Component Value Date    TSH 1.30 02/22/2022     Lab Results   Component Value Date    LACTATE 1.6 05/07/2025    TROPONINI <0.02 01/09/2022    BNP 1,547 (H) 05/07/2025    DDIMER 1,381 (A) 01/12/2022       IMAGES:  Encounter Date: 05/07/25   ECG 12 lead   Result Value    Ventricular Rate 107    Atrial Rate 107    DC Interval 146    QRS Duration 96    QT Interval 328    QTC Calculation(Bazett) 437    P Axis 34    R Axis 128    T Axis -35    QRS Count 18    Q Onset 204    P Onset 131    P Offset 175    T Offset 368    QTC Fredericia 397    Narrative    Sinus tachycardia  Left atrial enlargement  Incomplete right bundle branch block  Right ventricular hypertrophy  ST & T wave abnormality, consider inferior ischemia  ST & T wave abnormality, consider anterolateral ischemia  Abnormal ECG  When compared with ECG of 06-JAN-2022 11:55,  Incomplete right bundle branch block is now Present        Transthoracic Echo Complete  Result Date: 5/9/2025   [facility], [id number]  [place] [address]               Tel [phone] and Fax [phone] TRANSTHORACIC ECHOCARDIOGRAM REPORT  Patient Name:       ORVILLE JOSHI SAKINA Medina Physician:    [id number] Manny Cordero MD Study Date:         5/8/2025            Ordering Provider:    [id number] REBABernardoSIMONA SONG MRN/PID:            [id number]            Fellow: Accession#:         [id number]        Nurse:                Angelia Cazares RN Date of Birth/Age:  1955 / 69      Sonographer:          Camille ott RDCS Gender assigned at  F                   Additional Staff: Birth: Height:             162.56 cm           Admit Date:           5/8/2025 Weight:             70.76 kg            Admission Status:     Inpatient -                                                               Routine BSA / BMI:          1.76 m2 / 26.78     Encounter#:           [id number]                     kg/m2 Blood Pressure:     108/73 mmHg         Department Location:  [facility] Non                                                               Invasive Study Type:    TRANSTHORACIC ECHO (TTE) COMPLETE Diagnosis/ICD: Acute respiratory failure with hypoxia-J96.01; Pulmonary                hypertension, unspecified-I27.20 Indication:    acute respiratory failure, PHTN CPT Code:      Echo Complete w Full Doppler-[id number] Patient History: Diabetes:          Yes Pertinent History: HTN, Hyperlipidemia, GERD, DORA, respiratory failure, Dyspnea,                    Palpitations, CHF, COPD and Previous DVT. Study Detail: The following Echo studies were performed: 2D, M-Mode, Doppler and               color flow. Technically challenging study due to difficulty               breathing. Agitated saline used as a contrast agent for               intraseptal flow evaluation. The patient was awake.   PHYSICIAN INTERPRETATION: Left Ventricle: Left ventricular ejection fraction is normal, by visual estimate at 55-60%. There is concentric left ventricular hypertrophy. There are no regional left ventricular wall motion abnormalities. The left ventricular cavity size is decreased. There is moderately increased septal and mildly increased posterior left ventricular wall thickness. The interventricular septum is flattened in diastole ('D' shaped left ventricle), consistent with right ventricular volume overload. Left ventricular diastolic filling cannot be determined due to E/A wave fusion. Left Atrium: The left atrial size is normal. A bubble study using agitated saline was performed. Bubble study is positive. There is a delayed appearance of saline contrast bubbles noted in the left atrium, which is indicative of intrapulmonary shunting. Right Ventricle: The right ventricle is severely enlarged. There is moderately reduced right ventricular systolic function. Right Atrium: The right atrium is severely dilated. Aortic Valve: The aortic valve is trileaflet. There is minimal aortic valve cusp calcification. The aortic valve dimensionless index is 0.85. There is no evidence of aortic valve regurgitation. The peak instantaneous gradient of the aortic valve is 4 mmHg. The mean gradient of the aortic valve is 2 mmHg. Mitral Valve: The mitral valve is mildly thickened. There is trace mitral valve regurgitation. Tricuspid Valve: The tricuspid valve is structurally normal. There is mild to moderate tricuspid regurgitation. The Doppler estimated RVSP is moderately elevated at 62.0 mmHg. Pulmonic Valve: The pulmonic valve is structurally normal. There is physiologic pulmonic valve regurgitation. Pericardium: Small pericardial effusion. Aorta: The aortic root is normal. There is upper limits of normal dilatation of the ascending aorta. There is no dilatation of the aortic root. Systemic Veins: The inferior vena cava appears  dilated, with IVC inspiratory collapse less than 50%. In comparison to the previous echocardiogram(s): There are no prior studies on this patient for comparison purposes.  CONCLUSIONS:  1. Left ventricular ejection fraction is normal, by visual estimate at 55-60%.  2. Left ventricular cavity size is decreased.  3. Right ventricular volume overload.  4. There is moderately increased septal thickness.  5. There is moderately reduced right ventricular systolic function.  6. Severely enlarged right ventricle.  7. The right atrium is severely dilated.  8. Mild to moderate tricuspid regurgitation visualized.  9. Moderately elevated right ventricular systolic pressure. 10. A bubble study shows that an intrapulmonary shunting is present. 11. A bubble study using agitated saline was performed. Bubble study is positive. QUANTITATIVE DATA SUMMARY:  2D MEASUREMENTS:          Normal Ranges: IVSd:            1.29 cm  (0.6-1.1cm) LVPWd:           1.07 cm  (0.6-1.1cm) LVIDd:           3.24 cm  (3.9-5.9cm) LVIDs:           2.85 cm LV Mass Index:   118 g/m2 LVEDV Index:     25 ml/m2 LV % FS          12.0 %  LEFT ATRIUM:                  Normal Ranges: LA Vol A4C:        40.8 ml    (22+/-6mL/m2) LA Vol A2C:        22.7 ml LA Vol BP:         33.9 ml LA Vol Index A4C:  23.2ml/m2 LA Vol Index A2C:  12.9 ml/m2 LA Vol Index BP:   19.3 ml/m2 LA Area A4C:       15.8 cm2 LA Area A2C:       10.6 cm2 LA Major Axis A4C: 5.2 cm LA Major Axis A2C: 4.2 cm LA Volume Index:   19.3 ml/m2 LA Vol A4C:        38.7 ml LA Vol A2C:        21.8 ml LA Vol Index BSA:  17.2 ml/m2  RIGHT ATRIUM:          Normal Ranges: RA Area A4C:  28.0 cm2  AORTA MEASUREMENTS:         Normal Ranges: Ao Sinus, d:        2.80 cm (2.1-3.5cm) Asc Ao, d:          3.50 cm (2.1-3.4cm)  LV SYSTOLIC FUNCTION:                      Normal Ranges: EF-A4C View:    60 % (>=55%) EF-A2C View:    49 % EF-Biplane:     57 % EF-Visual:      58 % LV EF Reported: 58 %  LV DIASTOLIC FUNCTION:              Normal Ranges: MV Peak E:             0.23 m/s    (0.7-1.2 m/s) MV Peak A:             0.87 m/s    (0.42-0.7 m/s) E/A Ratio:             0.26        (1.0-2.2) MV e'                  0.060 m/s   (>8.0) MV lateral e'          0.08 m/s MV medial e'           0.04 m/s MV A Dur:              119.95 msec E/e' Ratio:            3.78        (<8.0)  AORTIC VALVE:                     Normal Ranges: AoV Vmax:                0.98 m/s (<=1.7m/s) AoV Peak PG:             3.8 mmHg (<20mmHg) AoV Mean P.1 mmHg (1.7-11.5mmHg) LVOT Max Adán:            0.84 m/s (<=1.1m/s) AoV VTI:                 15.26 cm (18-25cm) LVOT VTI:                13.00 cm LVOT Diameter:           1.99 cm  (1.8-2.4cm) AoV Area, VTI:           2.64 cm2 (2.5-5.5cm2) AoV Area,Vmax:           2.68 cm2 (2.5-4.5cm2) AoV Dimensionless Index: 0.85  RIGHT VENTRICLE: RV Basal 4.90 cm RV Mid   5.10 cm RV Major 7.3 cm TAPSE:   12.0 mm RV s'    0.06 m/s  TRICUSPID VALVE/RVSP:          Normal Ranges: Peak TR Velocity:     3.43 m/s RV Syst Pressure:     62 mmHg  (< 30mmHg) IVC Diam:             2.25 cm  AORTA: Asc Ao Diam 3.58 cm  [id number] Manny Cordero MD Electronically signed on 2025 at 8:54:42 AM  ** Final **     === 25 ===    XR CHEST 1 VIEW    - Impression -  1. Coarsening interstitial markings within bilateral lung fields in  particular left lung component which is likely chronic in relation to  interstitial lung disease.. However, superimposed component of  infiltrate in particular in the left lung and right lung apex not  excluded.    Signed by: Bimal Sim 2025 3:14 PM  Dictation workstation:   [id number]  === 25 ===    CT ANGIO CHEST FOR PULMONARY EMBOLISM    - Impression -  1. Worsening of usual interstitial pneumonia form of pulmonary  fibrosis as described above. Pulmonology follow-up recommended.    2. Diffuse intrathoracic lymphadenopathy, progressed, possibly  reactive on the basis of the aforementioned.    3.  Evidence of severe pulmonary hypertension and right heart  dysfunction with dilated main pulmonary artery, tortuous distal  pulmonary arteries, and dilated right heart chambers with marked  displacement of the interventricular septum toward the left  ventricle. Degree of main pulmonary artery dilatation is progressed  from prior study as is the degree of right heart chamber dilatation.    4. No acute pulmonary embolism.    MACRO:  None.    Signed by: Neo Beltran 5/7/2025 5:47 PM  Dictation workstation:   [id number]  === 05/07/25 ===    XR CHEST 1 VIEW    - Impression -  1. Coarsening interstitial markings within bilateral lung fields in  particular left lung component which is likely chronic in relation to  interstitial lung disease.. However, superimposed component of  infiltrate in particular in the left lung and right lung apex not  excluded.    Signed by: Bimal Sim 5/7/2025 3:14 PM  Dictation workstation:   [id number]      Additional results of the last 24 hours have been reviewed.    Dictated using [employer] Version 2.4  Proof read however unrecognized voice recognition errors may have occurred     Electronically signed by Denver Brunson DO on 05/12/25 at 8:17 AM           [1] aspirin, 81 mg, oral, Daily  atorvastatin, 40 mg, oral, Daily  benzonatate, 200 mg, oral, TID  budesonide, 0.5 mg, nebulization, BID   And  ipratropium-albuteroL, 3 mL, nebulization, TID  enoxaparin, 40 mg, subcutaneous, q24h  fluticasone, 1 spray, Each Nostril, Daily  furosemide, 40 mg, intravenous, q12h  [Held by provider] furosemide, 20 mg, oral, Daily  insulin lispro, 0-10 Units, subcutaneous, TID AC  [Held by provider] mycophenolate, 1,000 mg, oral, BID  pantoprazole, 40 mg, oral, Daily before breakfast  predniSONE, 40 mg, oral, Nightly     [2]    [3] PRN medications: acetaminophen, dextrose, dextrose, glucagon, glucagon, hydrocodone-homatropine, ipratropium-albuteroL, melatonin, oxygen,  sennosides-docusate sodium

## 2025-05-12 NOTE — PROCEDURES
"Speech-Language Pathology    Inpatient Modified Barium Swallow Study    Patient Name: Airam Tyson  MRN: [id number]  : 1955  Today's Date: 25  Time Calculation  Start Time: 1156  Stop Time: 1224  Time Calculation (min): 28 min        *See joint MBSS report under \"Imaging\" tab for complete comments from SLP and radiologist.*    Modified Barium Swallow Study completed. Informed verbal consent obtained prior to completion of exam. The study was completed per protocol with various liquid barium consistencies, pudding, solids and a 13mm barium tablet. A 1.9 cm or .75 inch (outer diameter) ring was placed on the chin in the lateral view and on the lateral side of the neck in the a-p view in order to complete objective measurements during swallowing. The anatomic structures and function of the oropharynx, larynx, hypopharynx and cervical esophagus were evaluated.    SLP: Krystle Denis SLP   Contact info: Available via secure chat      Reason for Referral: Ordered by physician due to concern for aspiration event leading to increased supplemental oxygen demands; pt reportedly agreed that she has symptoms of dysphagia and was scheduled to undergo MBS study as an outpatient next month.    Patient Hx: HTN, HLD, T2DM, GERD, COPD, DORA, ILD on Nintedanib and MMF, chronic respiratory failure on 4L at baseline, recurrent ophthalmic herpes zoster/shingles, h/o DVT    Respiratory Status: Supplemental oxygen via NC  Current diet: Consistent carb, 2-3gm sodium, 1500mL fluid; regular texture/thin liquid    Pain:  Pain Scale: 0-10  Ratin    FINAL SPEECH RECOMMENDATIONS    DIET:   - Regular solids (IDDSI Level 7)  - Thin liquids (IDDSI Level 0) SMALL SINGLE SIPS  - Meds whole, one at a time, in thin liquid vs puree, as best tolerated    STRATEGIES:  - Upright positioning for all PO intake  - Remain upright for >30 min after meals  - Slow rate of intake  - Small bites  - Small/SINGLE sips  - Alternate bites " and sips    SLP PLAN:  Skilled SLP Services: Skilled SLP intervention for dysphagia is warranted.  SLP Frequency: 1  Duration: 1 week  Treatment/Interventions:   - Oropharyngeal exercises  - Compensatory strategy training  - Patient/caregiver education    Discussed POC: Patient  Discussed Risks/Benefits: Yes  Patient/Caregiver Agreeable: Yes    Short term goals established 05/12/25:   - Pt will consume prescribed diet (current diet is regular/thin) without overt s/sx aspiration/penetration in 95% of observed trials.  - Pt will demonstrate follow-through of trained compensatory strategies during a meal/snack with 85% acc independently.    Long term goals established 05/12/25:   Pt will meet 100% of nutrition/hydration needs by mouth, via least restrictive diet, without pulmonary compromise.      Education Provided: Pt was briefly educated re: results of assessment, risk for aspiration, and recommended safe swallow strategies.    Treatment Provided Today: N/A    Additional Medical Consults Suggested:   - GI; consider further testing for esophageal function as delayed esophageal emptying/retrograde flow may contribute to aspiration    Repeat study/ dc plan: No repeat recommended at this time; repeat MBSS as needed if symptoms of oropharyngeal dysphagia worsen.       Mechanics of the Swallow Summary:  ORAL PHASE:  Lip Closure - Interlabial escape/no progression to anterior lip   Tongue Control During Bolus Hold - Posterior escape of less than half of the bolus   Bolus prep/mastication - Timely and efficient mastication skills   Bolus transport/lingual motion - Brisk tongue motion for A-P movement of the bolus   Oral residue - Trace residue lining oral structures     PHARYNGEAL PHASE:  Initiation of pharyngeal swallow - Bolus head at posterior laryngeal surface of epiglottis   Soft palate elevation - No bolus between soft palate/pharyngeal wall   Laryngeal elevation - Partial superior movement of thyroid cartilage and/or  partial approximation of arytenoids to epiglottic petiole   Anterior hyoid excursion - Partial anterior movement   Epiglottic movement - Complete inversion    Laryngeal vestibule closure - Incomplete - narrow column of air/contrast in laryngeal vestibule   Pharyngeal stripping wave - Complete  Pharyngeal contraction (A/P view) - Complete  Pharyngoesophageal segment opening - Partial distension/partial duration with partial obstruction of flow of bolus   Tongue base retraction - Trace column of contrast or air between tongue base and pharyngeal wall   Pharyngeal residue - Trace residue within or on the pharyngeal structures     ESOPHAGEAL PHASE:  Esophageal clearance - Esophageal retention with retrograde flow through the pharyngoesophageal segment (scant amount)      SLP Impressions with Severity Rating:   Pt presents with mild oropharyngeal dysphagia upon completion of modified barium swallow study this date. Swallowing physiology is detailed above. Impairments most impacting swallowing safety and efficiency include mild premature pharyngeal entry and mildly delayed swallow onset. Patient demonstrated trace, shallow penetration of thin liquids which typically cleared fully from the laryngeal vestibule but occasionally left scant coating on the underside of the epiglottis. No further penetration was observed for any other consistency, and no aspiration was visualized during study. Pt demonstrated no significant pharyngeal residuals.    Esophageal screening revealed stasis of liquids and solids which reduced but did not fully clear with dry swallows/water washes. Tablet passed into the stomach after a brief delay. *Of note: The A-P bolus follow-through is not intended to be utilized as a diagnostic assessment of the esophagus, rather a tool to observe the biomechanical aspects of the swallow continuum and to inform the need for further evaluation by medical specialists, as applicable.     Strategies attempted- Pt  completed a natural chin tuck posture spontaneously. Water wash successfully reduced esophageal residuals.      OUTCOME MEASURES:  Functional Oral Intake Scale  Functional Oral Intake Scale: Level 7        total oral diet with no restrictions      Rosenbek's Penetration Aspiration Scale  Thin Liquids: 3. PENETRATION with LOW ASPIRATION risk - contrast remains above vocal cords, visible residue]   Nectar Thick Liquids: 1. NO ASPIRATION & NO PENETRATION - no aspiration, contrast does not enter airway  Puree: 1. NO ASPIRATION & NO PENETRATION - no aspiration, contrast does not enter airway  Solids: 1. NO ASPIRATION & NO PENETRATION - no aspiration, contrast does not enter airway

## 2025-05-12 NOTE — CARE PLAN
The patient's goals for the shift include      The clinical goals for the shift include pt will remain 94% or higher during this shift

## 2025-05-13 ENCOUNTER — PATIENT OUTREACH (OUTPATIENT)
Dept: PRIMARY CARE | Facility: CLINIC | Age: 70
End: 2025-05-13
Payer: COMMERCIAL

## 2025-05-13 NOTE — PROGRESS NOTES
Discharge Facility: [facility]       Discharge Diagnosis:    Acute on chronic respiratory failure with hypoxia     Shortness of breath     Hypersensitivity pneumonitis (Multi)       Admission Date: 5/7/2025   Discharge Date:  5/12/2025     PCP Appointment Date: Tasked to office     Specialist Appointment Date:     [facility]      contact primary pulmonologist regarding resuming CellCept (which has been on hold due to suspected ophthalmic shingles     Hospital Encounter and Summary Linked: Yes    ED to Hosp-Admission (Discharged) with Denver Brunson DO; Judy Etienne DO (05/07/2025)     Two attempts were made to reach patient within two business days after discharge. Left voicemail with contact information for patient to call back with any non-emergent questions or concerns.

## 2025-05-14 ENCOUNTER — HOME CARE VISIT (OUTPATIENT)
Dept: HOME HEALTH SERVICES | Facility: HOME HEALTH | Age: 70
End: 2025-05-14
Payer: COMMERCIAL

## 2025-05-14 VITALS
DIASTOLIC BLOOD PRESSURE: 67 MMHG | SYSTOLIC BLOOD PRESSURE: 102 MMHG | RESPIRATION RATE: 20 BRPM | BODY MASS INDEX: 23.9 KG/M2 | HEIGHT: 64 IN | HEART RATE: 100 BPM | TEMPERATURE: 96.9 F | OXYGEN SATURATION: 96 % | WEIGHT: 140 LBS

## 2025-05-14 PROCEDURE — G0299 HHS/HOSPICE OF RN EA 15 MIN: HCPCS

## 2025-05-14 PROCEDURE — 169592 NO-PAY CLAIM PROCEDURE

## 2025-05-14 SDOH — ECONOMIC STABILITY: HOUSING INSECURITY: EVIDENCE OF SMOKING MATERIAL: 0

## 2025-05-14 SDOH — HEALTH STABILITY: MENTAL HEALTH: SMOKING IN HOME: 0

## 2025-05-14 SDOH — ECONOMIC STABILITY: FOOD INSECURITY: MEALS PER DAY: 3

## 2025-05-14 ASSESSMENT — ACTIVITIES OF DAILY LIVING (ADL)
CURRENT_FUNCTION: STAND BY ASSIST
OASIS_M1830: 05
AMBULATION ASSISTANCE: STAND BY ASSIST
ENTERING_EXITING_HOME: STAND BY ASSIST

## 2025-05-14 ASSESSMENT — ENCOUNTER SYMPTOMS
LAST BOWEL MOVEMENT: 67338
DYSPNEA ON EXERTION: 1
DENIES PAIN: 1
APPETITE LEVEL: GOOD
PERSON REPORTING PAIN: PATIENT
BOWEL PATTERN NORMAL: 1
DYSPNEA ACTIVITY LEVEL: AT REST
FATIGUES EASILY: 1
STOOL FREQUENCY: DAILY
CHANGE IN APPETITE: UNCHANGED
ORTHOPNEA: 1

## 2025-05-15 ENCOUNTER — HOME CARE VISIT (OUTPATIENT)
Dept: HOME HEALTH SERVICES | Facility: HOME HEALTH | Age: 70
End: 2025-05-15
Payer: COMMERCIAL

## 2025-05-15 VITALS
DIASTOLIC BLOOD PRESSURE: 80 MMHG | OXYGEN SATURATION: 96 % | SYSTOLIC BLOOD PRESSURE: 120 MMHG | TEMPERATURE: 97.2 F | HEART RATE: 100 BPM

## 2025-05-15 LAB
ATRIAL RATE: 107 BPM
P AXIS: 34 DEGREES
P OFFSET: 175 MS
P ONSET: 131 MS
PR INTERVAL: 146 MS
Q ONSET: 204 MS
QRS COUNT: 18 BEATS
QRS DURATION: 96 MS
QT INTERVAL: 328 MS
QTC CALCULATION(BAZETT): 437 MS
QTC FREDERICIA: 397 MS
R AXIS: 128 DEGREES
T AXIS: -35 DEGREES
T OFFSET: 368 MS
VENTRICULAR RATE: 107 BPM

## 2025-05-15 PROCEDURE — G0152 HHCP-SERV OF OT,EA 15 MIN: HCPCS

## 2025-05-15 SDOH — HEALTH STABILITY: MENTAL HEALTH: SMOKING IN HOME: 0

## 2025-05-15 SDOH — ECONOMIC STABILITY: HOUSING INSECURITY: EVIDENCE OF SMOKING MATERIAL: 0

## 2025-05-15 ASSESSMENT — ACTIVITIES OF DAILY LIVING (ADL)
GROOMING_WITHIN_DEFINED_LIMITS: 1
CURRENT_FUNCTION: SUPERVISION
WASHING_LB_CURRENT_FUNCTION: SUPERVISION
WASHING_UPB_CURRENT_FUNCTION: SUPERVISION
PHYSICAL TRANSFERS ASSESSED: 1
FEEDING_WITHIN_DEFINED_LIMITS: 1

## 2025-05-15 ASSESSMENT — ENCOUNTER SYMPTOMS
DENIES PAIN: 1
PERSON REPORTING PAIN: PATIENT

## 2025-05-15 NOTE — CASE COMMUNICATION
Patient  Subjective: patient lives at home with daughter who works during the day. only bathroom is upstairs. patient presents short of breath, on 5L continuous oxygen     Primary Reason for Home Care: post hospital stay, COPD, heart failure  Skilled Needs: comprehensive nursing assessment, medication management, oxygen education   Precautions: universal  Instruction provided: reviewed all meds schedule and purpose and potential side ef fects  Patient response to instruction:verbalized understanding  Patient instructed on plan of care and visit frequency.   Patient in agreement with plan of care and visit frequency.    Discipline Communication: md   Plan for next visit: comprehensive nursing assessment, medication instruction    Medication Reconciliation:    Demonstrates Adherence : Yes  Issues/Concerns: Yes, describe: see above  Provider Notified of Issues/Concerns: Y es  Caregiver Notified of Issues/Concerns: Yes  patient  response to instructions Verbalized Understanding  Pill bottles visualized during treatment Yes

## 2025-05-15 NOTE — SIGNIFICANT EVENT
Follow Up Phone Call    Outgoing phone call    Spoke to: Airam Tyson Relationship:self   Phone number: [phone]      Outcome: contacted patient/ family   Chief Complaint   Patient presents with    Shortness of Breath     Pt has chronic SOB from interstitial lung disease and CHF hx.           Diagnosis:Not applicable    States she is feeling better. No further questions or concerns.

## 2025-05-16 ENCOUNTER — PATIENT OUTREACH (OUTPATIENT)
Dept: PRIMARY CARE | Facility: CLINIC | Age: 70
End: 2025-05-16
Payer: COMMERCIAL

## 2025-05-16 NOTE — PROGRESS NOTES
Unable to reach patient for follow up call appears PCP visit has not been seen or jesse thus far at this time     Left voicemail with call back number for patient to call if needed   If no voicemail available call attempts x 2 were made to contact the patient to assist with any questions or concerns patient may have.     L/M Encouraged to call providers for any questions concerns or change in condition

## 2025-05-17 ENCOUNTER — HOME CARE VISIT (OUTPATIENT)
Dept: HOME HEALTH SERVICES | Facility: HOME HEALTH | Age: 70
End: 2025-05-17
Payer: COMMERCIAL

## 2025-05-17 PROCEDURE — G0151 HHCP-SERV OF PT,EA 15 MIN: HCPCS

## 2025-05-18 VITALS
SYSTOLIC BLOOD PRESSURE: 110 MMHG | DIASTOLIC BLOOD PRESSURE: 70 MMHG | HEART RATE: 80 BPM | TEMPERATURE: 97 F | RESPIRATION RATE: 22 BRPM | OXYGEN SATURATION: 95 %

## 2025-05-18 SDOH — HEALTH STABILITY: PHYSICAL HEALTH
EXERCISE COMMENTS: XTENSIONS, SEATED MARCHING, SEATED HEEL AND TOE RAISES 1 SET 10 REPS EACH.  PATIENT REQUIRED VERBAL AND VISUAL CUING FOR PROPER EXECUTION OF EXERCISES.  PT HAS BEEN PROVIDED WRITTEN HOME EX HANDOUTS.

## 2025-05-18 SDOH — HEALTH STABILITY: PHYSICAL HEALTH: EXERCISE TYPE: BLE STRENGTHENING EXERCISES

## 2025-05-18 SDOH — ECONOMIC STABILITY: HOUSING INSECURITY: EVIDENCE OF SMOKING MATERIAL: 0

## 2025-05-18 SDOH — HEALTH STABILITY: MENTAL HEALTH: SMOKING IN HOME: 0

## 2025-05-18 SDOH — HEALTH STABILITY: PHYSICAL HEALTH
EXERCISE COMMENTS: INSTRUCTION AND RETURN DEMONSTRATION OF PROPER DEEP BREATHING TECHNIQUE WITH NASAL INHILATION AND PURSED LIP EXHILATION IN A 1:2 RATIO WITH SUPPLEMENTAL O2 AT 4 LITERS VIA NASAL CANULA, ALSO INSTRUCTED IN THERAPEUTIC EXERCISES INCLUDING SEATED KNEE E

## 2025-05-18 ASSESSMENT — PAIN SCALES - PAIN ASSESSMENT IN ADVANCED DEMENTIA (PAINAD)
BREATHING: 0
BODYLANGUAGE: 0 - RELAXED.
FACIALEXPRESSION: 0 - SMILING OR INEXPRESSIVE.
TOTALSCORE: 0
NEGVOCALIZATION: 0
FACIALEXPRESSION: 0
CONSOLABILITY: 0 - NO NEED TO CONSOLE.
BODYLANGUAGE: 0
NEGVOCALIZATION: 0 - NONE.
CONSOLABILITY: 0

## 2025-05-18 ASSESSMENT — ENCOUNTER SYMPTOMS
DENIES PAIN: 1
LOWEST PAIN SEVERITY IN PAST 24 HOURS: 0/10
PERSON REPORTING PAIN: PATIENT
OCCASIONAL FEELINGS OF UNSTEADINESS: 1
PAIN SEVERITY GOAL: 0/10
HIGHEST PAIN SEVERITY IN PAST 24 HOURS: 0/10

## 2025-05-18 ASSESSMENT — ACTIVITIES OF DAILY LIVING (ADL)
AMBULATION ASSISTANCE ON FLAT SURFACES: 1
AMBULATION_DISTANCE/DURATION_TOLERATED: 40 FT X 1

## 2025-05-20 ENCOUNTER — HOME CARE VISIT (OUTPATIENT)
Dept: HOME HEALTH SERVICES | Facility: HOME HEALTH | Age: 70
End: 2025-05-20
Payer: COMMERCIAL

## 2025-05-20 VITALS
TEMPERATURE: 98 F | SYSTOLIC BLOOD PRESSURE: 108 MMHG | HEART RATE: 84 BPM | RESPIRATION RATE: 22 BRPM | DIASTOLIC BLOOD PRESSURE: 64 MMHG | OXYGEN SATURATION: 92 %

## 2025-05-20 VITALS
SYSTOLIC BLOOD PRESSURE: 110 MMHG | TEMPERATURE: 97.2 F | DIASTOLIC BLOOD PRESSURE: 70 MMHG | HEART RATE: 100 BPM | OXYGEN SATURATION: 95 %

## 2025-05-20 DIAGNOSIS — E11.65 TYPE 2 DIABETES MELLITUS WITH HYPERGLYCEMIA, WITHOUT LONG-TERM CURRENT USE OF INSULIN: ICD-10-CM

## 2025-05-20 DIAGNOSIS — J43.1 PANLOBULAR EMPHYSEMA (MULTI): Primary | ICD-10-CM

## 2025-05-20 PROCEDURE — G0152 HHCP-SERV OF OT,EA 15 MIN: HCPCS

## 2025-05-20 PROCEDURE — G0300 HHS/HOSPICE OF LPN EA 15 MIN: HCPCS

## 2025-05-20 SDOH — ECONOMIC STABILITY: HOUSING INSECURITY: EVIDENCE OF SMOKING MATERIAL: 0

## 2025-05-20 SDOH — HEALTH STABILITY: MENTAL HEALTH: SMOKING IN HOME: 0

## 2025-05-20 ASSESSMENT — ENCOUNTER SYMPTOMS
PAIN LOCATION - PAIN DURATION: DAILY
MUSCLE WEAKNESS: 1
HIGHEST PAIN SEVERITY IN PAST 24 HOURS: 4/10
PAIN LOCATION - PAIN FREQUENCY: INTERMITTENT
LAST BOWEL MOVEMENT: 67344
HIGHEST PAIN SEVERITY IN PAST 24 HOURS: 5/10
PAIN LOCATION: HEAD
PAIN LOCATION - EXACERBATING FACTORS: MOVEMENT
LOWEST PAIN SEVERITY IN PAST 24 HOURS: 3/10
PAIN LOCATION - RELIEVING FACTORS: REST
PAIN SEVERITY GOAL: 0/10
PERSON REPORTING PAIN: PATIENT
APPETITE LEVEL: FAIR
PAIN: 1
PAIN: 1
PAIN LOCATION - PAIN QUALITY: ACHING
DIZZINESS: 1
CHANGE IN APPETITE: VARYING
DYSPNEA ACTIVITY LEVEL: AFTER AMBULATING LESS THAN 10 FT
PAIN LOCATION - PAIN SEVERITY: 3/10
PAIN LOCATION: BACK
PAIN LOCATION: BACK
SHORTNESS OF BREATH: 1

## 2025-05-20 ASSESSMENT — ACTIVITIES OF DAILY LIVING (ADL)
TOILETING: 1
CURRENT_FUNCTION: SUPERVISION
PHYSICAL TRANSFERS ASSESSED: 1
TOILETING: INDEPENDENT

## 2025-05-20 NOTE — PROGRESS NOTES
I reviewed the progress note and agree with the resident’s findings and plans as written. Case discussed with resident.    Darlene Hartley, PharmD

## 2025-05-21 ENCOUNTER — HOME CARE VISIT (OUTPATIENT)
Dept: HOME HEALTH SERVICES | Facility: HOME HEALTH | Age: 70
End: 2025-05-21
Payer: COMMERCIAL

## 2025-05-21 VITALS
OXYGEN SATURATION: 93 % | SYSTOLIC BLOOD PRESSURE: 118 MMHG | RESPIRATION RATE: 18 BRPM | HEART RATE: 92 BPM | TEMPERATURE: 97.4 F | DIASTOLIC BLOOD PRESSURE: 60 MMHG

## 2025-05-21 PROCEDURE — G0157 HHC PT ASSISTANT EA 15: HCPCS | Mod: CQ

## 2025-05-21 SDOH — ECONOMIC STABILITY: HOUSING INSECURITY: EVIDENCE OF SMOKING MATERIAL: 0

## 2025-05-21 SDOH — HEALTH STABILITY: MENTAL HEALTH: SMOKING IN HOME: 0

## 2025-05-21 ASSESSMENT — ENCOUNTER SYMPTOMS
HIGHEST PAIN SEVERITY IN PAST 24 HOURS: 6/10
LOWEST PAIN SEVERITY IN PAST 24 HOURS: 2/10
PERSON REPORTING PAIN: PATIENT
PAIN LOCATION - PAIN DURATION: CHRONIC
PAIN LOCATION - PAIN SEVERITY: 4/10
PAIN: 1
PAIN LOCATION - PAIN FREQUENCY: FREQUENT
PAIN LOCATION: BACK
PAIN LOCATION - PAIN QUALITY: ACHING
PAIN SEVERITY GOAL: 0/10

## 2025-05-22 ENCOUNTER — HOME CARE VISIT (OUTPATIENT)
Dept: HOME HEALTH SERVICES | Facility: HOME HEALTH | Age: 70
End: 2025-05-22
Payer: COMMERCIAL

## 2025-05-22 PROCEDURE — G0153 HHCP-SVS OF S/L PATH,EA 15MN: HCPCS

## 2025-05-23 ENCOUNTER — HOME CARE VISIT (OUTPATIENT)
Dept: HOME HEALTH SERVICES | Facility: HOME HEALTH | Age: 70
End: 2025-05-23
Payer: COMMERCIAL

## 2025-05-23 VITALS
DIASTOLIC BLOOD PRESSURE: 82 MMHG | TEMPERATURE: 97.1 F | HEART RATE: 101 BPM | OXYGEN SATURATION: 99 % | SYSTOLIC BLOOD PRESSURE: 110 MMHG

## 2025-05-23 PROCEDURE — G0151 HHCP-SERV OF PT,EA 15 MIN: HCPCS

## 2025-05-23 ASSESSMENT — ENCOUNTER SYMPTOMS
PAIN LOCATION - RELIEVING FACTORS: TYLENOL
PERSON REPORTING PAIN: PATIENT
PAIN LOCATION: BACK
PAIN LOCATION - PAIN SEVERITY: 4/10
PAIN: 1

## 2025-05-27 ENCOUNTER — HOME CARE VISIT (OUTPATIENT)
Dept: HOME HEALTH SERVICES | Facility: HOME HEALTH | Age: 70
End: 2025-05-27
Payer: COMMERCIAL

## 2025-05-27 VITALS
RESPIRATION RATE: 20 BRPM | DIASTOLIC BLOOD PRESSURE: 70 MMHG | HEART RATE: 98 BPM | OXYGEN SATURATION: 96 % | TEMPERATURE: 97.8 F | SYSTOLIC BLOOD PRESSURE: 102 MMHG

## 2025-05-27 PROCEDURE — G0300 HHS/HOSPICE OF LPN EA 15 MIN: HCPCS

## 2025-05-27 SDOH — ECONOMIC STABILITY: HOUSING INSECURITY: EVIDENCE OF SMOKING MATERIAL: 0

## 2025-05-27 SDOH — HEALTH STABILITY: MENTAL HEALTH: SMOKING IN HOME: 0

## 2025-05-27 ASSESSMENT — ENCOUNTER SYMPTOMS
DENIES PAIN: 1
LAST BOWEL MOVEMENT: 67352
COUGH: 1
APPETITE LEVEL: FAIR
SHORTNESS OF BREATH: 1
CHANGE IN APPETITE: UNCHANGED

## 2025-05-28 ENCOUNTER — HOME CARE VISIT (OUTPATIENT)
Dept: HOME HEALTH SERVICES | Facility: HOME HEALTH | Age: 70
End: 2025-05-28
Payer: COMMERCIAL

## 2025-05-28 VITALS — DIASTOLIC BLOOD PRESSURE: 68 MMHG | HEART RATE: 115 BPM | SYSTOLIC BLOOD PRESSURE: 100 MMHG | OXYGEN SATURATION: 96 %

## 2025-05-28 PROCEDURE — G0151 HHCP-SERV OF PT,EA 15 MIN: HCPCS

## 2025-05-29 ENCOUNTER — HOME CARE VISIT (OUTPATIENT)
Dept: HOME HEALTH SERVICES | Facility: HOME HEALTH | Age: 70
End: 2025-05-29
Payer: COMMERCIAL

## 2025-05-29 VITALS
DIASTOLIC BLOOD PRESSURE: 60 MMHG | TEMPERATURE: 98 F | SYSTOLIC BLOOD PRESSURE: 110 MMHG | OXYGEN SATURATION: 95 % | HEART RATE: 100 BPM

## 2025-05-29 PROCEDURE — G0152 HHCP-SERV OF OT,EA 15 MIN: HCPCS

## 2025-05-29 SDOH — ECONOMIC STABILITY: HOUSING INSECURITY: EVIDENCE OF SMOKING MATERIAL: 0

## 2025-05-29 SDOH — HEALTH STABILITY: MENTAL HEALTH: SMOKING IN HOME: 0

## 2025-05-29 ASSESSMENT — ENCOUNTER SYMPTOMS
PERSON REPORTING PAIN: PATIENT
DENIES PAIN: 1

## 2025-05-29 ASSESSMENT — ACTIVITIES OF DAILY LIVING (ADL): PREPARING MEALS: INDEPENDENT

## 2025-05-30 ENCOUNTER — HOME CARE VISIT (OUTPATIENT)
Dept: HOME HEALTH SERVICES | Facility: HOME HEALTH | Age: 70
End: 2025-05-30
Payer: COMMERCIAL

## 2025-05-30 ENCOUNTER — APPOINTMENT (OUTPATIENT)
Dept: PRIMARY CARE | Facility: CLINIC | Age: 70
End: 2025-05-30
Payer: COMMERCIAL

## 2025-05-30 VITALS
SYSTOLIC BLOOD PRESSURE: 120 MMHG | HEART RATE: 100 BPM | RESPIRATION RATE: 18 BRPM | OXYGEN SATURATION: 100 % | TEMPERATURE: 97.6 F | DIASTOLIC BLOOD PRESSURE: 76 MMHG

## 2025-05-30 PROCEDURE — G0157 HHC PT ASSISTANT EA 15: HCPCS | Mod: CQ

## 2025-05-30 SDOH — ECONOMIC STABILITY: HOUSING INSECURITY: EVIDENCE OF SMOKING MATERIAL: 0

## 2025-05-30 SDOH — HEALTH STABILITY: MENTAL HEALTH: SMOKING IN HOME: 0

## 2025-05-30 ASSESSMENT — ENCOUNTER SYMPTOMS: DENIES PAIN: 1

## 2025-06-02 ENCOUNTER — HOME CARE VISIT (OUTPATIENT)
Dept: HOME HEALTH SERVICES | Facility: HOME HEALTH | Age: 70
End: 2025-06-02
Payer: MEDICARE

## 2025-06-02 VITALS
DIASTOLIC BLOOD PRESSURE: 86 MMHG | HEART RATE: 106 BPM | TEMPERATURE: 97.2 F | SYSTOLIC BLOOD PRESSURE: 110 MMHG | OXYGEN SATURATION: 89 %

## 2025-06-02 PROCEDURE — G0151 HHCP-SERV OF PT,EA 15 MIN: HCPCS

## 2025-06-03 ENCOUNTER — HOME CARE VISIT (OUTPATIENT)
Dept: HOME HEALTH SERVICES | Facility: HOME HEALTH | Age: 70
End: 2025-06-03
Payer: MEDICARE

## 2025-06-03 ENCOUNTER — APPOINTMENT (OUTPATIENT)
Dept: PRIMARY CARE | Facility: CLINIC | Age: 70
End: 2025-06-03
Payer: MEDICARE

## 2025-06-03 VITALS
SYSTOLIC BLOOD PRESSURE: 112 MMHG | WEIGHT: 139.4 LBS | TEMPERATURE: 98.2 F | HEIGHT: 64 IN | BODY MASS INDEX: 23.8 KG/M2 | HEART RATE: 110 BPM | DIASTOLIC BLOOD PRESSURE: 64 MMHG | OXYGEN SATURATION: 82 %

## 2025-06-03 VITALS
DIASTOLIC BLOOD PRESSURE: 70 MMHG | HEART RATE: 94 BPM | TEMPERATURE: 97.8 F | SYSTOLIC BLOOD PRESSURE: 120 MMHG | OXYGEN SATURATION: 94 %

## 2025-06-03 DIAGNOSIS — I27.23 PULMONARY HYPERTENSION DUE TO LUNG DISEASE (MULTI): ICD-10-CM

## 2025-06-03 DIAGNOSIS — I10 ESSENTIAL HYPERTENSION: ICD-10-CM

## 2025-06-03 DIAGNOSIS — E11.65 TYPE 2 DIABETES MELLITUS WITH HYPERGLYCEMIA, WITHOUT LONG-TERM CURRENT USE OF INSULIN: ICD-10-CM

## 2025-06-03 DIAGNOSIS — K21.9 GASTROESOPHAGEAL REFLUX DISEASE, UNSPECIFIED WHETHER ESOPHAGITIS PRESENT: ICD-10-CM

## 2025-06-03 LAB
ATRIAL RATE: 100 BPM
P AXIS: 45 DEGREES
P OFFSET: 189 MS
P ONSET: 143 MS
PR INTERVAL: 156 MS
Q ONSET: 221 MS
QRS COUNT: 17 BEATS
QRS DURATION: 86 MS
QT INTERVAL: 324 MS
QTC CALCULATION(BAZETT): 417 MS
QTC FREDERICIA: 384 MS
R AXIS: 124 DEGREES
T AXIS: -33 DEGREES
T OFFSET: 383 MS
VENTRICULAR RATE: 100 BPM

## 2025-06-03 PROCEDURE — 1159F MED LIST DOCD IN RCRD: CPT | Performed by: NURSE PRACTITIONER

## 2025-06-03 PROCEDURE — 3074F SYST BP LT 130 MM HG: CPT | Performed by: NURSE PRACTITIONER

## 2025-06-03 PROCEDURE — 1111F DSCHRG MED/CURRENT MED MERGE: CPT | Performed by: NURSE PRACTITIONER

## 2025-06-03 PROCEDURE — G0152 HHCP-SERV OF OT,EA 15 MIN: HCPCS | Mod: HHH

## 2025-06-03 PROCEDURE — 3044F HG A1C LEVEL LT 7.0%: CPT | Performed by: NURSE PRACTITIONER

## 2025-06-03 PROCEDURE — G2211 COMPLEX E/M VISIT ADD ON: HCPCS | Performed by: NURSE PRACTITIONER

## 2025-06-03 PROCEDURE — 3078F DIAST BP <80 MM HG: CPT | Performed by: NURSE PRACTITIONER

## 2025-06-03 PROCEDURE — 1126F AMNT PAIN NOTED NONE PRSNT: CPT | Performed by: NURSE PRACTITIONER

## 2025-06-03 PROCEDURE — 99213 OFFICE O/P EST LOW 20 MIN: CPT | Performed by: NURSE PRACTITIONER

## 2025-06-03 PROCEDURE — 3008F BODY MASS INDEX DOCD: CPT | Performed by: NURSE PRACTITIONER

## 2025-06-03 PROCEDURE — 1036F TOBACCO NON-USER: CPT | Performed by: NURSE PRACTITIONER

## 2025-06-03 SDOH — HEALTH STABILITY: MENTAL HEALTH: SMOKING IN HOME: 0

## 2025-06-03 SDOH — ECONOMIC STABILITY: HOUSING INSECURITY: EVIDENCE OF SMOKING MATERIAL: 0

## 2025-06-03 ASSESSMENT — ENCOUNTER SYMPTOMS
BLOOD IN STOOL: 0
APPETITE CHANGE: 0
EYE DISCHARGE: 0
LOSS OF SENSATION IN FEET: 0
WEAKNESS: 0
PALPITATIONS: 0
DENIES PAIN: 1
DYSURIA: 0
SINUS PAIN: 0
BRUISES/BLEEDS EASILY: 0
ARTHRALGIAS: 0
DIZZINESS: 0
PHOTOPHOBIA: 0
DIARRHEA: 0
HEMATURIA: 0
OCCASIONAL FEELINGS OF UNSTEADINESS: 1
ADENOPATHY: 0
HEADACHES: 0
AGITATION: 0
DEPRESSION: 0
ABDOMINAL PAIN: 0
ACTIVITY CHANGE: 0
SHORTNESS OF BREATH: 1
CONSTIPATION: 0
BACK PAIN: 0
TREMORS: 0
SORE THROAT: 0
NERVOUS/ANXIOUS: 0
COUGH: 1
JOINT SWELLING: 0
PERSON REPORTING PAIN: PATIENT

## 2025-06-03 ASSESSMENT — ACTIVITIES OF DAILY LIVING (ADL)
TOILETING: SUPERVISION
PHYSICAL TRANSFERS ASSESSED: 1
CURRENT_FUNCTION: SUPERVISION
TOILETING: 1

## 2025-06-03 ASSESSMENT — LIFESTYLE VARIABLES: HOW MANY STANDARD DRINKS CONTAINING ALCOHOL DO YOU HAVE ON A TYPICAL DAY: PATIENT DOES NOT DRINK

## 2025-06-03 ASSESSMENT — PAIN SCALES - GENERAL: PAINLEVEL_OUTOF10: 0-NO PAIN

## 2025-06-03 NOTE — PATIENT INSTRUCTIONS
Focus on healthy eating including lean proteins and vegetables.  Avoid high carbohydrate high sugar foods and drinks.  Try to increase physical activity as tolerated.  Goal is for 160 minutes/week of physical activity.  Check blood pressure 2-3 times a week.  Call for blood pressures greater than 140/90.  Bring list of blood pressures to next visit.  We will call you when letter is written  Check with Medicaid about chair lift for stair navigation

## 2025-06-03 NOTE — PROGRESS NOTES
Subjective   Patient ID: Airam Tyson is a 70 y.o. female who presents for Follow-up.    Presents today for follow-up after being hospitalized for acute on chronic respiratory hypoxia.  She was discharged on May 12.  She remains on 4 L of oxygen at rest and increases to 5 L with activity.  She is almost always short of breath.  She cannot be without her oxygen.  She is homebound due to the fact that she needs to utilize her rollator as well as needing to go assistance of 1 person to get out of the house.  She denies current chest pain.  She has been receiving home physical therapy, speech therapy, Occupational Therapy and nursing since being discharged from the hospital.  Of note her home has a gas stove.  This is a safety concern due to her need for continuous oxygen usage.  She not be cooking on a gas stove while  wearing oxygen.  Patient is requesting letter for her landlord to address this.  We will compose an send letter when available.  Patient reports compliance with her medications.  Patient repeatedly needs to be instructed on pursed lip breathing and decreasing her respiratory rate while in the office today.  She is very cooperative and verbalizes understanding on how to do this.  She does have scheduled appointments for follow-up with pulmonology.  * This note was partially generated using the Dragon Voice recognition system. There may be some incorrect wording, spelling and/or spelling errors or punctuation errors that were not corrected prior to committing the note to the medical record.*         Review of Systems   Constitutional:  Negative for activity change and appetite change.   HENT:  Negative for hearing loss, sinus pain and sore throat.    Eyes:  Negative for photophobia, discharge and visual disturbance.   Respiratory:  Positive for cough and shortness of breath.    Cardiovascular:  Negative for chest pain, palpitations and leg swelling.   Gastrointestinal:  Negative for abdominal pain,  "blood in stool, constipation and diarrhea.   Endocrine: Negative for cold intolerance, heat intolerance and polyuria.   Genitourinary:  Negative for dysuria and hematuria.   Musculoskeletal:  Negative for arthralgias, back pain and joint swelling.   Skin:  Negative for rash.   Allergic/Immunologic: Negative for environmental allergies and food allergies.   Neurological:  Negative for dizziness, tremors, syncope, weakness and headaches.   Hematological:  Negative for adenopathy. Does not bruise/bleed easily.   Psychiatric/Behavioral:  Negative for agitation and suicidal ideas. The patient is not nervous/anxious.        Objective   /64 (BP Location: Left arm, Patient Position: Sitting)   Pulse 110   Temp 36.8 °C (98.2 °F) (Temporal)   Ht 1.626 m (5' 4\")   Wt 63.2 kg (139 lb 6.4 oz)   SpO2 (!) 82%   BMI 23.93 kg/m²     Physical Exam  Vitals reviewed.   Constitutional:       General: She is not in acute distress.     Appearance: Normal appearance.   HENT:      Head: Normocephalic.      Nose: Nose normal.   Eyes:      Conjunctiva/sclera: Conjunctivae normal.   Cardiovascular:      Rate and Rhythm: Normal rate and regular rhythm.      Heart sounds: Normal heart sounds.   Pulmonary:      Effort: Accessory muscle usage present. No respiratory distress.      Breath sounds: Normal breath sounds. Decreased air movement present. No wheezing or rhonchi.   Abdominal:      Palpations: Abdomen is soft.   Musculoskeletal:         General: Normal range of motion.   Skin:     General: Skin is warm and dry.   Neurological:      Mental Status: She is alert and oriented to person, place, and time.   Psychiatric:         Mood and Affect: Mood is anxious.         Speech: Speech normal.         Assessment/Plan   Problem List Items Addressed This Visit           ICD-10-CM    Essential hypertension I10    Relevant Orders    Follow Up In Primary Care - Established    GERD (gastroesophageal reflux disease) K21.9    Type 2 diabetes " mellitus with hyperglycemia (Multi) E11.65    Relevant Orders    Follow Up In Primary Care - Established    Pulmonary hypertension due to lung disease (Multi) I27.23    Relevant Orders    Follow Up In Primary Care - Established

## 2025-06-04 ENCOUNTER — HOME CARE VISIT (OUTPATIENT)
Dept: HOME HEALTH SERVICES | Facility: HOME HEALTH | Age: 70
End: 2025-06-04
Payer: MEDICARE

## 2025-06-04 VITALS
RESPIRATION RATE: 18 BRPM | TEMPERATURE: 97.8 F | SYSTOLIC BLOOD PRESSURE: 126 MMHG | DIASTOLIC BLOOD PRESSURE: 76 MMHG | HEART RATE: 95 BPM | OXYGEN SATURATION: 94 %

## 2025-06-04 PROCEDURE — G0157 HHC PT ASSISTANT EA 15: HCPCS | Mod: CQ,HHH

## 2025-06-04 SDOH — ECONOMIC STABILITY: HOUSING INSECURITY: EVIDENCE OF SMOKING MATERIAL: 0

## 2025-06-04 SDOH — HEALTH STABILITY: MENTAL HEALTH: SMOKING IN HOME: 0

## 2025-06-04 ASSESSMENT — ENCOUNTER SYMPTOMS
PERSON REPORTING PAIN: PATIENT
PAIN LOCATION: BACK
PAIN LOCATION - PAIN DURATION: CHRONIC
PAIN: 1
PAIN LOCATION - PAIN FREQUENCY: FREQUENT
PAIN LOCATION - PAIN QUALITY: ACHING
PAIN LOCATION - RELIEVING FACTORS: REST, MEDICATION
PAIN LOCATION - PAIN SEVERITY: 4/10

## 2025-06-07 ENCOUNTER — HOME CARE VISIT (OUTPATIENT)
Dept: HOME HEALTH SERVICES | Facility: HOME HEALTH | Age: 70
End: 2025-06-07
Payer: MEDICARE

## 2025-06-09 ENCOUNTER — HOME CARE VISIT (OUTPATIENT)
Dept: HOME HEALTH SERVICES | Facility: HOME HEALTH | Age: 70
End: 2025-06-09
Payer: MEDICARE

## 2025-06-09 VITALS
HEART RATE: 115 BPM | DIASTOLIC BLOOD PRESSURE: 68 MMHG | OXYGEN SATURATION: 88 % | TEMPERATURE: 98.7 F | SYSTOLIC BLOOD PRESSURE: 90 MMHG

## 2025-06-09 PROCEDURE — G0151 HHCP-SERV OF PT,EA 15 MIN: HCPCS | Mod: HHH

## 2025-06-09 SDOH — ECONOMIC STABILITY: HOUSING INSECURITY: EVIDENCE OF SMOKING MATERIAL: 0

## 2025-06-09 SDOH — HEALTH STABILITY: MENTAL HEALTH: SMOKING IN HOME: 0

## 2025-06-09 ASSESSMENT — ENCOUNTER SYMPTOMS
PAIN: 1
PERSON REPORTING PAIN: PATIENT

## 2025-06-09 ASSESSMENT — ACTIVITIES OF DAILY LIVING (ADL)
HOME_HEALTH_OASIS: 01
OASIS_M1830: 02
AMBULATION_DISTANCE/DURATION_TOLERATED: HOUSEHOLD DISTANCES
AMBULATION ASSISTANCE ON FLAT SURFACES: 1

## 2025-06-10 NOTE — CASE COMMUNICATION
DISCHARGE SUMMARY:    DISCIPLINE: Physical Therapy  DATE OF DISCIPLINE DISCHARGE: 6.9.25  REASON FOR DISCHARGE: adequate for dc  COORDINATION NOTE: Worked with patient on energy conservation and therexs for bles. Patient limited by sob with sats dropping to 88 percent on 4liters with just talking. Patient requires redirection to focus on the breathing. Would benefit from a chair lift to the seond floor. Encouraged to reach out to [facility] to see if they can help with this  EVALUATION OF GOALS: Patient able to exit the home with the rollator. Fair compliance to hep.   SUMMARY OF CARE PROVIDED: therexs for ble strengthening, gait training and addressed energy conservation  DISCHARGE INSTRUCTIONS GIVEN: continue with exercises and to take rest breaks as needed  SERVICES REMAINING: none  NOMNC OBTAINED: yes     Pre closure device inserted.

## 2025-06-10 NOTE — CASE COMMUNICATION
DISCHARGE SUMMARY:  patient declined need for further sn visits  DISCIPLINE: Nursing  DATE OF DISCIPLINE DISCHARGE: june 6 2025  REASON FOR DISCHARGE: PER CLIENT REQUEST  COORDINATION NOTE:   EVALUATION OF GOALS: met  SUMMARY OF CARE PROVIDED: comprehensive nursing assessment medication instruction copd carepath home and oxygent safety instruction  DISCHARGE INSTRUCTIONS GIVEN: yes  SERVICES REMAINING: PT  NOMNC OBTAINED: no

## 2025-07-05 DIAGNOSIS — J44.1 CHRONIC OBSTRUCTIVE PULMONARY DISEASE WITH (ACUTE) EXACERBATION (MULTI): ICD-10-CM

## 2025-07-05 DIAGNOSIS — K21.9 GASTRO-ESOPHAGEAL REFLUX DISEASE WITHOUT ESOPHAGITIS: Primary | ICD-10-CM

## 2025-07-08 RX ORDER — OMEPRAZOLE 20 MG/1
20 CAPSULE, DELAYED RELEASE ORAL DAILY
Qty: 90 CAPSULE | Refills: 1 | Status: SHIPPED | OUTPATIENT
Start: 2025-07-08

## 2025-07-08 RX ORDER — DEXTROMETHORPHAN HBR, GUAIFENESIN 1200; 60 MG/1; MG/1
1 TABLET, EXTENDED RELEASE ORAL EVERY 12 HOURS PRN
Qty: 56 TABLET | Refills: 6 | Status: SHIPPED | OUTPATIENT
Start: 2025-07-08